# Patient Record
Sex: FEMALE | Race: BLACK OR AFRICAN AMERICAN | NOT HISPANIC OR LATINO | Employment: UNEMPLOYED | URBAN - METROPOLITAN AREA
[De-identification: names, ages, dates, MRNs, and addresses within clinical notes are randomized per-mention and may not be internally consistent; named-entity substitution may affect disease eponyms.]

---

## 2023-03-24 LAB
GENETICS TEST NAME-DATA CONVERSION: NORMAL
LAB MOLECULAR CA TECHNICAL NOTES: NORMAL

## 2023-04-11 ENCOUNTER — CLINICAL SUPPORT (OUTPATIENT)
Dept: PRIMARY CARE | Facility: CLINIC | Age: 53
End: 2023-04-11
Payer: COMMERCIAL

## 2023-04-11 ENCOUNTER — TELEPHONE (OUTPATIENT)
Dept: PRIMARY CARE | Facility: CLINIC | Age: 53
End: 2023-04-11

## 2023-04-11 DIAGNOSIS — A18.4 TB SKIN/SUBCUTANEOUS: ICD-10-CM

## 2023-04-11 PROCEDURE — 86580 TB INTRADERMAL TEST: CPT | Performed by: FAMILY MEDICINE

## 2023-04-12 PROBLEM — R92.8 ABNORMAL FINDING ON BREAST IMAGING: Status: ACTIVE | Noted: 2023-04-12

## 2023-04-12 PROBLEM — N95.1 VASOMOTOR SYMPTOMS DUE TO MENOPAUSE: Status: ACTIVE | Noted: 2023-04-12

## 2023-04-12 PROBLEM — C77.3 BREAST CANCER METASTASIZED TO AXILLARY LYMPH NODE, LEFT (MULTI): Status: ACTIVE | Noted: 2023-04-12

## 2023-04-12 PROBLEM — E55.9 VITAMIN D DEFICIENCY: Status: ACTIVE | Noted: 2023-04-12

## 2023-04-12 PROBLEM — C50.912 BREAST CANCER METASTASIZED TO AXILLARY LYMPH NODE, LEFT (MULTI): Status: ACTIVE | Noted: 2023-04-12

## 2023-04-12 PROBLEM — N63.32 MASS OF AXILLARY TAIL OF LEFT BREAST: Status: ACTIVE | Noted: 2023-04-12

## 2023-04-12 PROBLEM — N63.21 MASS OF UPPER OUTER QUADRANT OF LEFT BREAST: Status: ACTIVE | Noted: 2023-04-12

## 2023-04-12 PROBLEM — D25.9 UTERINE FIBROID: Status: ACTIVE | Noted: 2023-04-12

## 2023-04-12 PROBLEM — G56.01 CARPAL TUNNEL SYNDROME, RIGHT: Status: ACTIVE | Noted: 2023-04-12

## 2023-04-12 PROBLEM — M48.02 CERVICAL SPINAL STENOSIS: Status: ACTIVE | Noted: 2023-04-12

## 2023-04-12 PROBLEM — E04.9 GOITER: Status: ACTIVE | Noted: 2023-04-12

## 2023-04-12 RX ORDER — BENZOYL PEROXIDE 100 MG/ML
LIQUID TOPICAL DAILY
COMMUNITY
Start: 2018-09-24 | End: 2024-01-30 | Stop reason: SDUPTHER

## 2023-04-13 ENCOUNTER — CLINICAL SUPPORT (OUTPATIENT)
Dept: PRIMARY CARE | Facility: CLINIC | Age: 53
End: 2023-04-13
Payer: COMMERCIAL

## 2023-04-13 LAB
INDURATION: 0 MM
TB SKIN TEST: NEGATIVE

## 2023-04-18 ENCOUNTER — APPOINTMENT (OUTPATIENT)
Dept: PRIMARY CARE | Facility: CLINIC | Age: 53
End: 2023-04-18
Payer: COMMERCIAL

## 2023-04-20 ENCOUNTER — CLINICAL SUPPORT (OUTPATIENT)
Dept: PRIMARY CARE | Facility: CLINIC | Age: 53
End: 2023-04-20
Payer: COMMERCIAL

## 2023-05-23 ENCOUNTER — HOSPITAL ENCOUNTER (OUTPATIENT)
Dept: DATA CONVERSION | Facility: HOSPITAL | Age: 53
End: 2023-05-23
Attending: SURGERY
Payer: COMMERCIAL

## 2023-05-23 DIAGNOSIS — C50.912 MALIGNANT NEOPLASM OF UNSPECIFIED SITE OF LEFT FEMALE BREAST (MULTI): ICD-10-CM

## 2023-05-23 DIAGNOSIS — R92.8 OTHER ABNORMAL AND INCONCLUSIVE FINDINGS ON DIAGNOSTIC IMAGING OF BREAST: ICD-10-CM

## 2023-05-24 ENCOUNTER — HOSPITAL ENCOUNTER (OUTPATIENT)
Dept: DATA CONVERSION | Facility: HOSPITAL | Age: 53
End: 2023-05-24
Attending: SURGERY | Admitting: SURGERY
Payer: COMMERCIAL

## 2023-05-24 DIAGNOSIS — R92.8 OTHER ABNORMAL AND INCONCLUSIVE FINDINGS ON DIAGNOSTIC IMAGING OF BREAST: ICD-10-CM

## 2023-05-24 DIAGNOSIS — L73.2 HIDRADENITIS SUPPURATIVA: ICD-10-CM

## 2023-05-24 DIAGNOSIS — Z17.1 ESTROGEN RECEPTOR NEGATIVE STATUS (ER-): ICD-10-CM

## 2023-05-24 DIAGNOSIS — Z86.718 PERSONAL HISTORY OF OTHER VENOUS THROMBOSIS AND EMBOLISM: ICD-10-CM

## 2023-05-24 DIAGNOSIS — C50.912 MALIGNANT NEOPLASM OF UNSPECIFIED SITE OF LEFT FEMALE BREAST (MULTI): ICD-10-CM

## 2023-05-24 DIAGNOSIS — C50.412 MALIGNANT NEOPLASM OF UPPER-OUTER QUADRANT OF LEFT FEMALE BREAST (MULTI): ICD-10-CM

## 2023-05-24 DIAGNOSIS — K21.9 GASTRO-ESOPHAGEAL REFLUX DISEASE WITHOUT ESOPHAGITIS: ICD-10-CM

## 2023-05-25 LAB
HSV 1 PCR QUAL, SKIN/MUCOSA: NOT DETECTED
HSV 2 PCR QUAL, SKIN/MUCOSA: NOT DETECTED

## 2023-06-15 LAB
COMPLETE PATHOLOGY REPORT: NORMAL
CONVERTED CLINICAL DIAGNOSIS-HISTORY: NORMAL
CONVERTED FINAL DIAGNOSIS: NORMAL
CONVERTED FINAL REPORT PDF LINK TO COPY AND PASTE: NORMAL
CONVERTED GROSS DESCRIPTION: NORMAL
CONVERTED INTRAOPERATIVE DIAGNOSIS: NORMAL
CONVERTED SYNOPTIC DIAGNOSIS: NORMAL

## 2023-07-17 ENCOUNTER — DOCUMENTATION (OUTPATIENT)
Dept: PRIMARY CARE | Facility: CLINIC | Age: 53
End: 2023-07-17
Payer: COMMERCIAL

## 2023-07-18 ENCOUNTER — PATIENT OUTREACH (OUTPATIENT)
Dept: PRIMARY CARE | Facility: CLINIC | Age: 53
End: 2023-07-18
Payer: COMMERCIAL

## 2023-07-24 ENCOUNTER — HOSPITAL ENCOUNTER (OUTPATIENT)
Dept: DATA CONVERSION | Facility: HOSPITAL | Age: 53
Discharge: HOME | End: 2023-07-24

## 2023-07-24 DIAGNOSIS — C77.3 SECONDARY AND UNSPECIFIED MALIGNANT NEOPLASM OF AXILLA AND UPPER LIMB LYMPH NODES (MULTI): ICD-10-CM

## 2023-07-24 DIAGNOSIS — C50.912 MALIGNANT NEOPLASM OF UNSPECIFIED SITE OF LEFT FEMALE BREAST (MULTI): ICD-10-CM

## 2023-09-07 VITALS — BODY MASS INDEX: 35.47 KG/M2 | HEIGHT: 63 IN | WEIGHT: 200.18 LBS

## 2023-09-30 NOTE — H&P
"    History & Physical Reviewed:   Pregnant/Lactating:  ·  Are You Pregnant no (1)   ·  Are You Currently Breastfeeding no (1)     I have reviewed the History and Physical dated:  02-May-2023   History and Physical reviewed and relevant findings noted. Patient examined to review pertinent physical  findings.: No significant changes   Home Medications Reviewed: no changes noted   Allergies Reviewed: no changes noted       ERAS (Enhanced Recovery After Surgery):  ·  ERAS Patient: no     Consent:   COVID-19 Consent:  ·  COVID-19 Risk Consent Surgeon has reviewed key risks related to the risk of herbert COVID-19 and if they contract COVID-19 what the risks are.       Electronic Signatures:  Zora Reyes (MD)  (Signed 24-May-2023 07:27)   Authored: History & Physical Reviewed, ERAS, Consent,  Note Completion      Last Updated: 24-May-2023 07:27 by Zora Reyes (MD)    References:  1.  Data Referenced From \"Patient Profile - Preop v3\" 24-May-2023 06:14   "

## 2023-10-02 NOTE — OP NOTE
PROCEDURE DETAILS    Preoperative Diagnosis:  1. Left breast invasive ductal carcinoma, Stage IIB, ER-/SC-/HER2-, s/p neoadjuvant chemotherapy   2. Axillary hidradenitis      Postoperative Diagnosis:  1. Left breast invasive ductal carcinoma, Stage IIB, ER-/SC-/HER2-, s/p neoadjuvant chemotherapy   2. Axillary hidradenitis      Surgeon: Zora Reyes  Resident/Fellow/Other Assistant: Becca Whitney    Procedure:    1. Injection of radiotracer for intraoperative lymphatic mapping.  2. Injection of isosulfan blue for intraoperative lymphatic mapping.  3. Left axillary sentinel lymph node biopsy with dual tracer intraoperative lymphatic mapping.  4. Excision of left axillary lymph node with Magseed localization.   5. Left breast Magseed localized partial mastectomy  6. Left breast tissue rearrangement       Anesthesia: Adams Escobar  Estimated Blood Loss: 30 cc  Findings: left breast tissue with Magseed and biopsy clip in specimen on xray; left axillary SLN x4 benign on frozen section  Specimens(s) Collected: yes,  left breast tissue, shave cavity margins, left axillary SLN x4   Patient Returned To/Condition: PACU/stable         Operative Report:   INDICATIONS FOR PROCEDURE:    Sharonda Yeboah is a 52-year-old female who presented a palpable mass in her left axilla in October 2022. Diagnostic work-up yielded several large left axillary lymph nodes and left breast mass for which core needle biopsy yielded invasive ductal cancer,  ER-/SC-/HER2-; [id number], Stage IIB. I recommended neoadjuvant chemotherapy, which she completed in April 2023. Genetic testing was negative for deleterious mutations. She also met with Dr. Hawkins and adjuvant radiation was planned. Post-therapy imaging  and exam showed an excellent response to neoadjuvant chemotherapy. We discussed at length surgical options and I recommended left partial mastectomy, left axillary sentinel lymph node biopsy with dual tracer localization, and excision of the  previously  positive lymph node with Magseed localization, possible axillary dissection. Following a detailed discussion regarding risks, benefits, and alternatives, informed consent was obtained, and we agreed to proceed. Prior to her procedure, a Magseed was placed  in the previously biopsied left axillary lymph node and left breast mass. My personal review of her images demonstrated the Magseeds in good position relative to the lymph node, mass, and biopsy clip.      DESCRIPTION OF PROCEDURE:    The patient was brought to the operating room and positioned supine on the OR table.  Following patient identification and a time-out procedure, SCDs were applied, and antibiotics were administered. General anesthesia was initiated. I personally injected  5 mL of isosulfan blue and Tc-99 into the patient's left breast for dual tracer intraoperative lymphatic mapping. The breast was massaged for 5 minutes.  I used the Neoprobe to confirm uptake of the radiotracer in the left breast and left axilla.  I used  the Sentimag probe to confirm placement of the Magseed in the left axilla and left breast. The operative field was prepped and draped in a standard sterile fashion.      We began with the left axilla. An incision was marked at the base of the hair-bearing skin. Of note, she had significant hidradenitis which was less active due to current treatment with rifampin and clindamycin. 1% lidocaine was injected subcutaneously.  An incision was made sharply.  Dissection was carried down through the skin and subcutaneous tissues to the clavipectoral fascia, which was widely incised. I used the Sentimag probe to identify the Magseed deeper within the axilla, and then identified  the lymph node by palpation. Small clips were used on branching vessels and lymphatics. The lymph node containing the Magseed was excised. The node was hot and blue as well. It was labeled as left axillary sentinel lymph node #1, placed on a grid, and   sent to the breast Center for an x-ray; this demonstrated the Mageed and biopsy clip within the lymph node. I personally interpreted these images intraoperatively. The specimen was then sent to the pathology lab for frozen section. Three additional left  axillary sentinel lymph nodes were identified, removed, and sent to pathology for frozen section; all 3 were hot and blue. There were no additional visibly blue lymph nodes, no lymph nodes with increased radiotracer uptake, and no palpably suspicious  nodes.  The axilla was copiously irrigated with warm sterile saline solution.  Hemostasis was achieved.       We turned our attention to the left breast. The cancer was located laterally at mid depth. An incision at the far lateral breast was chosen to hide the surgical scar in a cosmetic location. 1% lidocaine was injected subcutaneously.  The skin was incised  sharply.  Dissection was carried down through skin and subcutaneous tissues. Soft tissue flaps were then raised in all directions: superior, inferior, medial, and lateral.  The tumor was located medial to our incision; therefore, a tunneling technique  was used.  I identified the anterior mastectomy plane between the subcutaneous tissues and the anterior breast parenchyma. I then developed the mastectomy flap widely up to and beyond the lesion. The Sentimag probe was used to identify the Magseed in  the area targeted for excision, and a marking stitch was placed within it for dissection purposes. This area was widely and circumferentially dissected from the surrounding tissues using electrocautery.  The specimen was excised and labeled as left breast  tissue.  It was marked with a double short stitch superior, double long stitch lateral, and a single stitch anterior. I personally inked all 6 margins of the entire specimen with the Vector kit using colors as prescribed.  It was placed on a grid and  sent to the Breast Center for an x-ray which demonstrated the  biopsy clip, and the Magseed within the specimen.  I personally interpreted these images intraoperatively.  The specimen was then sent to Pathology for permanent section.     I then proceeded to take shave cavity margins circumferentially: superior, medial, inferior, lateral, anterior, and posterior. Each was removed, marked with a clip and ink on the new margin, and sent separately to Pathology for permanent section. The  excision cavity was copiously irrigated with warm sterile saline solution.  Hemostasis was achieved.  I then placed marking clips circumferentially around the cavity: superior, inferior, medial, lateral, posterior and anterior.     The specimen measured 4.5 cm x 4.0 cm x 3.5 cm, and with shave cavity margins, the soft tissue defect was approximately 75 sq cm; therefore, local tissue rearrangement was performed. Surrounding breast parenchyma was mobilized circumferentially from the  anterior mastectomy plane anteriorly and posteriorly from within the breast parenchyma.  Once mobilized, the superior medial and inferior lateral pillars were advanced centrally and secured to each other with a dyed 3-0 Vicryl suture in an interrupted  fashion to close the defect. Hemostasis was reconfirmed. The more superficial layer of tissue that had been mobilized in the breast at the beginning of the procedure was similarly closed in order to create a cosmetic effect.     Our frozen section results returned and all 4 left axillary lymph nodes were benign; #1 with the Magseed and biopsy clip also showed fibrosis and therapy changes.    0.5% Marcaine was injected subcutaneously for analgesia into both the breast and axillary incisions.  The incisions were then closed in layers with an interrupted 3-0 Vicryl in the deep dermis followed by a running subcuticular 4-0 Monocryl for the skin.  Skin glue, sterile dressings, fluffs, and a surgical bra were then applied.     The patient tolerated the procedure well.  There  were no immediate complications.  All counts were correct.  Estimated blood loss was 30 cc.  I was present for and performed the entire procedure.  The patient was then awakened and transported to the PACU  in stable condition.      Zora Reyes MD  Breast Surgical Oncology   pager [phone]    Note Recipients:   Abebe Hamlin MD Shroff, Palak, MD - [phone] [Preferred]      Fredi Synoptic Op Report:  Breast [place] Node Biopsy  Operation performed with curative intent: yes  Tracer(s) used to identify sentinel nodes in the upfront surgery (non-neoadjuvant) setting: not applicable  Tracer(s) used to identify sentinel nodes in the neoadjuvant setting: dye and radioactive tracer  All nodes (colored or non-colored) present at the end of a dye-filled lymphatic channel were removed: yes  All significantly radioactive nodes were removed: yes  All palpably suspicious nodes were removed: yes  Biopsy-proven positive nodes marked with clips prior to chemotherapy were identified and removed: yes                    Attestation:   Note Completion:  Attending Attestation I performed the procedure without a resident         Electronic Signatures:  Zora Reyes)  (Signed 24-May-2023 21:24)   Authored: Post-Operative Note, Chart Review, Note Completion      Last Updated: 24-May-2023 21:24 by Zora Reyes)

## 2023-10-10 ENCOUNTER — APPOINTMENT (OUTPATIENT)
Dept: PHYSICAL THERAPY | Facility: CLINIC | Age: 53
End: 2023-10-10
Payer: COMMERCIAL

## 2023-10-16 ENCOUNTER — OFFICE VISIT (OUTPATIENT)
Dept: PRIMARY CARE | Facility: CLINIC | Age: 53
End: 2023-10-16
Payer: COMMERCIAL

## 2023-10-16 VITALS
HEART RATE: 81 BPM | SYSTOLIC BLOOD PRESSURE: 120 MMHG | OXYGEN SATURATION: 95 % | WEIGHT: 185 LBS | BODY MASS INDEX: 31.58 KG/M2 | DIASTOLIC BLOOD PRESSURE: 75 MMHG | HEIGHT: 64 IN

## 2023-10-16 DIAGNOSIS — C77.3 BREAST CANCER METASTASIZED TO AXILLARY LYMPH NODE, LEFT (MULTI): ICD-10-CM

## 2023-10-16 DIAGNOSIS — E55.9 VITAMIN D DEFICIENCY: ICD-10-CM

## 2023-10-16 DIAGNOSIS — E04.9 GOITER: ICD-10-CM

## 2023-10-16 DIAGNOSIS — Z00.00 ROUTINE GENERAL MEDICAL EXAMINATION AT HEALTH CARE FACILITY: ICD-10-CM

## 2023-10-16 DIAGNOSIS — R73.9 ELEVATED BLOOD SUGAR: ICD-10-CM

## 2023-10-16 DIAGNOSIS — I82.4Z2 ACUTE DEEP VEIN THROMBOSIS (DVT) OF DISTAL VEIN OF LEFT LOWER EXTREMITY (MULTI): ICD-10-CM

## 2023-10-16 DIAGNOSIS — E78.2 MODERATE MIXED HYPERLIPIDEMIA NOT REQUIRING STATIN THERAPY: ICD-10-CM

## 2023-10-16 DIAGNOSIS — D50.8 OTHER IRON DEFICIENCY ANEMIA: ICD-10-CM

## 2023-10-16 DIAGNOSIS — L73.2 HIDRADENITIS SUPPURATIVA: ICD-10-CM

## 2023-10-16 DIAGNOSIS — Z00.00 MEDICARE ANNUAL WELLNESS VISIT, SUBSEQUENT: Primary | ICD-10-CM

## 2023-10-16 DIAGNOSIS — Z11.59 NEED FOR HEPATITIS C SCREENING TEST: ICD-10-CM

## 2023-10-16 DIAGNOSIS — Z11.4 SCREENING FOR HIV WITHOUT PRESENCE OF RISK FACTORS: ICD-10-CM

## 2023-10-16 DIAGNOSIS — E66.09 CLASS 1 OBESITY DUE TO EXCESS CALORIES WITH SERIOUS COMORBIDITY AND BODY MASS INDEX (BMI) OF 31.0 TO 31.9 IN ADULT: ICD-10-CM

## 2023-10-16 DIAGNOSIS — C50.912 BREAST CANCER METASTASIZED TO AXILLARY LYMPH NODE, LEFT (MULTI): ICD-10-CM

## 2023-10-16 PROBLEM — R92.8 ABNORMAL FINDING ON BREAST IMAGING: Status: RESOLVED | Noted: 2023-04-12 | Resolved: 2023-10-16

## 2023-10-16 PROBLEM — N63.21 MASS OF UPPER OUTER QUADRANT OF LEFT BREAST: Status: RESOLVED | Noted: 2023-04-12 | Resolved: 2023-10-16

## 2023-10-16 LAB
ALBUMIN SERPL BCP-MCNC: 3.8 G/DL (ref 3.4–5)
ALP SERPL-CCNC: 102 U/L (ref 33–110)
ALT SERPL W P-5'-P-CCNC: 12 U/L (ref 7–45)
ANION GAP SERPL CALC-SCNC: 15 MMOL/L (ref 10–20)
AST SERPL W P-5'-P-CCNC: 14 U/L (ref 9–39)
BILIRUB SERPL-MCNC: 0.5 MG/DL (ref 0–1.2)
BUN SERPL-MCNC: 13 MG/DL (ref 6–23)
CALCIUM SERPL-MCNC: 9.6 MG/DL (ref 8.6–10.6)
CHLORIDE SERPL-SCNC: 106 MMOL/L (ref 98–107)
CHOLEST SERPL-MCNC: 213 MG/DL (ref 0–199)
CHOLESTEROL/HDL RATIO: 3.7
CO2 SERPL-SCNC: 26 MMOL/L (ref 21–32)
CREAT SERPL-MCNC: 0.9 MG/DL (ref 0.5–1.05)
EST. AVERAGE GLUCOSE BLD GHB EST-MCNC: 103 MG/DL
FERRITIN SERPL-MCNC: 72 NG/ML (ref 8–150)
GFR SERPL CREATININE-BSD FRML MDRD: 76 ML/MIN/1.73M*2
GLUCOSE SERPL-MCNC: 98 MG/DL (ref 74–99)
HBA1C MFR BLD: 5.2 %
HDLC SERPL-MCNC: 57.2 MG/DL
IRON SATN MFR SERPL: 26 % (ref 25–45)
IRON SERPL-MCNC: 85 UG/DL (ref 35–150)
LDLC SERPL CALC-MCNC: 136 MG/DL
NON HDL CHOLESTEROL: 156 MG/DL (ref 0–149)
POTASSIUM SERPL-SCNC: 4.6 MMOL/L (ref 3.5–5.3)
PROT SERPL-MCNC: 6.3 G/DL (ref 6.4–8.2)
SODIUM SERPL-SCNC: 142 MMOL/L (ref 136–145)
TIBC SERPL-MCNC: 321 UG/DL (ref 240–445)
TRIGL SERPL-MCNC: 101 MG/DL (ref 0–149)
UIBC SERPL-MCNC: 236 UG/DL (ref 110–370)
VLDL: 20 MG/DL (ref 0–40)

## 2023-10-16 PROCEDURE — 83550 IRON BINDING TEST: CPT

## 2023-10-16 PROCEDURE — G0439 PPPS, SUBSEQ VISIT: HCPCS | Performed by: FAMILY MEDICINE

## 2023-10-16 PROCEDURE — 1036F TOBACCO NON-USER: CPT | Performed by: FAMILY MEDICINE

## 2023-10-16 PROCEDURE — 80061 LIPID PANEL: CPT

## 2023-10-16 PROCEDURE — 3008F BODY MASS INDEX DOCD: CPT | Performed by: FAMILY MEDICINE

## 2023-10-16 PROCEDURE — 99213 OFFICE O/P EST LOW 20 MIN: CPT | Performed by: FAMILY MEDICINE

## 2023-10-16 PROCEDURE — 80053 COMPREHEN METABOLIC PANEL: CPT

## 2023-10-16 PROCEDURE — 82728 ASSAY OF FERRITIN: CPT

## 2023-10-16 PROCEDURE — 82607 VITAMIN B-12: CPT

## 2023-10-16 PROCEDURE — 84443 ASSAY THYROID STIM HORMONE: CPT

## 2023-10-16 PROCEDURE — 86803 HEPATITIS C AB TEST: CPT

## 2023-10-16 PROCEDURE — 87389 HIV-1 AG W/HIV-1&-2 AB AG IA: CPT

## 2023-10-16 PROCEDURE — 36415 COLL VENOUS BLD VENIPUNCTURE: CPT

## 2023-10-16 PROCEDURE — 83540 ASSAY OF IRON: CPT

## 2023-10-16 PROCEDURE — 83036 HEMOGLOBIN GLYCOSYLATED A1C: CPT

## 2023-10-16 RX ORDER — OMEPRAZOLE 40 MG/1
40 CAPSULE, DELAYED RELEASE ORAL DAILY
COMMUNITY
Start: 2023-07-10

## 2023-10-16 RX ORDER — CALCIUM CARBONATE 500(1250)
1 TABLET ORAL
COMMUNITY

## 2023-10-16 RX ORDER — MINOCYCLINE HYDROCHLORIDE 100 MG/1
CAPSULE ORAL
COMMUNITY

## 2023-10-16 RX ORDER — MULTIVITAMIN
1 TABLET ORAL DAILY
COMMUNITY

## 2023-10-16 RX ORDER — VITAMIN E MIXED 400 UNIT
CAPSULE ORAL DAILY
COMMUNITY
End: 2024-04-04 | Stop reason: WASHOUT

## 2023-10-16 RX ORDER — BIOTIN 10000 MCG
TABLET,CHEWABLE ORAL
COMMUNITY
End: 2024-04-04 | Stop reason: WASHOUT

## 2023-10-16 ASSESSMENT — ENCOUNTER SYMPTOMS
LOSS OF SENSATION IN FEET: 0
OCCASIONAL FEELINGS OF UNSTEADINESS: 0
DEPRESSION: 0

## 2023-10-16 ASSESSMENT — PATIENT HEALTH QUESTIONNAIRE - PHQ9
SUM OF ALL RESPONSES TO PHQ9 QUESTIONS 1 AND 2: 0
1. LITTLE INTEREST OR PLEASURE IN DOING THINGS: NOT AT ALL
2. FEELING DOWN, DEPRESSED OR HOPELESS: NOT AT ALL

## 2023-10-16 ASSESSMENT — ACTIVITIES OF DAILY LIVING (ADL)
BATHING: INDEPENDENT
MANAGING_FINANCES: INDEPENDENT
DRESSING: INDEPENDENT
GROCERY_SHOPPING: INDEPENDENT
TAKING_MEDICATION: INDEPENDENT
DOING_HOUSEWORK: INDEPENDENT

## 2023-10-16 ASSESSMENT — COLUMBIA-SUICIDE SEVERITY RATING SCALE - C-SSRS
2. HAVE YOU ACTUALLY HAD ANY THOUGHTS OF KILLING YOURSELF?: NO
1. IN THE PAST MONTH, HAVE YOU WISHED YOU WERE DEAD OR WISHED YOU COULD GO TO SLEEP AND NOT WAKE UP?: NO

## 2023-10-17 LAB
HCV AB SER QL: NONREACTIVE
HIV 1+2 AB+HIV1 P24 AG SERPL QL IA: NONREACTIVE
TSH SERPL-ACNC: 3.47 MIU/L (ref 0.44–3.98)
VIT B12 SERPL-MCNC: 293 PG/ML (ref 211–911)

## 2023-10-18 ENCOUNTER — TELEPHONE (OUTPATIENT)
Dept: DERMATOLOGY | Facility: CLINIC | Age: 53
End: 2023-10-18
Payer: COMMERCIAL

## 2023-10-18 DIAGNOSIS — L73.2 HIDRADENITIS SUPPURATIVA: Primary | ICD-10-CM

## 2023-10-18 RX ORDER — DOXYCYCLINE HYCLATE 100 MG
100 TABLET ORAL 2 TIMES DAILY
Qty: 180 TABLET | Refills: 0 | Status: SHIPPED | OUTPATIENT
Start: 2023-10-18 | End: 2024-01-30 | Stop reason: SDUPTHER

## 2023-10-18 NOTE — TELEPHONE ENCOUNTER
----- Message from Shagufta Cui MA sent at 10/18/2023  9:02 AM EDT -----  Regarding: Change Meds  Patient called and stated that the minocycline is not working and would like to switch to doxycycline

## 2023-10-18 NOTE — TELEPHONE ENCOUNTER
----- Message from Jamia Carter MD sent at 10/18/2023 12:09 PM EDT -----  Regarding: RE: Change Meds  I have sent Rx doxycycline for a 3 month supply to her [facility]    I would like to see her in clinic again for follow up - I know she has been busy with her other medical problems but can we schedule her for another in person visit in the next 3 months?    ----- Message -----  From: Shagufta Cui MA  Sent: 10/18/2023   9:03 AM EDT  To: Jamia Carter MD  Subject: Change Meds                                      Patient called and stated that the minocycline is not working and would like to switch to doxycycline

## 2023-10-18 NOTE — TELEPHONE ENCOUNTER
I have sent Rx doxycycline for a 3 month supply to her [facility]    I would like to see her in clinic again for follow up - I know she has been busy with her other medical problems but can we schedule her for another in person visit in the next 3 months?

## 2023-10-24 ENCOUNTER — HOSPITAL ENCOUNTER (OUTPATIENT)
Dept: RADIOLOGY | Facility: HOSPITAL | Age: 53
Discharge: HOME | End: 2023-10-24
Payer: COMMERCIAL

## 2023-10-24 ENCOUNTER — OFFICE VISIT (OUTPATIENT)
Dept: SURGICAL ONCOLOGY | Facility: HOSPITAL | Age: 53
End: 2023-10-24
Payer: COMMERCIAL

## 2023-10-24 VITALS
TEMPERATURE: 96.6 F | SYSTOLIC BLOOD PRESSURE: 130 MMHG | HEART RATE: 82 BPM | WEIGHT: 186.95 LBS | BODY MASS INDEX: 31.92 KG/M2 | OXYGEN SATURATION: 99 % | HEIGHT: 64 IN | DIASTOLIC BLOOD PRESSURE: 81 MMHG | RESPIRATION RATE: 17 BRPM

## 2023-10-24 DIAGNOSIS — C77.3 BREAST CANCER METASTASIZED TO AXILLARY LYMPH NODE, LEFT (MULTI): Primary | ICD-10-CM

## 2023-10-24 DIAGNOSIS — C50.912 MALIGNANT NEOPLASM OF UNSPECIFIED SITE OF LEFT FEMALE BREAST (MULTI): ICD-10-CM

## 2023-10-24 DIAGNOSIS — Z08 ENCOUNTER FOR FOLLOW-UP SURVEILLANCE OF BREAST CANCER: ICD-10-CM

## 2023-10-24 DIAGNOSIS — L73.2 HIDRADENITIS SUPPURATIVA: ICD-10-CM

## 2023-10-24 DIAGNOSIS — Z92.3 S/P RADIATION THERAPY: ICD-10-CM

## 2023-10-24 DIAGNOSIS — Z90.12 STATUS POST PARTIAL MASTECTOMY OF LEFT BREAST: ICD-10-CM

## 2023-10-24 DIAGNOSIS — N63.0 UNSPECIFIED LUMP IN UNSPECIFIED BREAST: ICD-10-CM

## 2023-10-24 DIAGNOSIS — Z92.21 S/P CHEMOTHERAPY, TIME SINCE GREATER THAN 12 WEEKS: ICD-10-CM

## 2023-10-24 DIAGNOSIS — C50.912 BREAST CANCER METASTASIZED TO AXILLARY LYMPH NODE, LEFT (MULTI): Primary | ICD-10-CM

## 2023-10-24 DIAGNOSIS — Z85.3 ENCOUNTER FOR FOLLOW-UP SURVEILLANCE OF BREAST CANCER: ICD-10-CM

## 2023-10-24 PROCEDURE — 76642 ULTRASOUND BREAST LIMITED: CPT | Mod: LT

## 2023-10-24 PROCEDURE — 99213 OFFICE O/P EST LOW 20 MIN: CPT | Mod: 25 | Performed by: SURGERY

## 2023-10-24 PROCEDURE — 3008F BODY MASS INDEX DOCD: CPT | Performed by: SURGERY

## 2023-10-24 PROCEDURE — 99213 OFFICE O/P EST LOW 20 MIN: CPT | Performed by: SURGERY

## 2023-10-24 PROCEDURE — 77062 BREAST TOMOSYNTHESIS BI: CPT

## 2023-10-24 PROCEDURE — 77066 DX MAMMO INCL CAD BI: CPT | Performed by: RADIOLOGY

## 2023-10-24 PROCEDURE — 1036F TOBACCO NON-USER: CPT | Performed by: SURGERY

## 2023-10-24 PROCEDURE — 77066 DX MAMMO INCL CAD BI: CPT

## 2023-10-24 PROCEDURE — 76642 ULTRASOUND BREAST LIMITED: CPT | Performed by: RADIOLOGY

## 2023-10-24 PROCEDURE — G0279 TOMOSYNTHESIS, MAMMO: HCPCS | Performed by: RADIOLOGY

## 2023-10-24 ASSESSMENT — PAIN SCALES - GENERAL: PAINLEVEL: 0-NO PAIN

## 2023-10-24 ASSESSMENT — ENCOUNTER SYMPTOMS
NEUROLOGICAL NEGATIVE: 1
ENDOCRINE NEGATIVE: 1
MUSCULOSKELETAL NEGATIVE: 1
PSYCHIATRIC NEGATIVE: 1
GASTROINTESTINAL NEGATIVE: 1
HEMATOLOGIC/LYMPHATIC NEGATIVE: 1
CARDIOVASCULAR NEGATIVE: 1
CONSTITUTIONAL NEGATIVE: 1
EYES NEGATIVE: 1
RESPIRATORY NEGATIVE: 1
ALLERGIC/IMMUNOLOGIC NEGATIVE: 1

## 2023-10-24 NOTE — PROGRESS NOTES
Subjective   Patient ID: Sharonda Yeboah is a 53 y.o. female presenting for breast cancer surveillance.    HPI  Sharonda Yeboah is a very pleasant 53 year old  female s/p left Magseed localized partial mastectomy, left axillary SLNB with Magseed localization 5/24/23 for invasive ductal carcinoma, ER/IL/HER2 negative, [id number], clinical stage IIB s/p neoadjuvant chemotherapy. Surgical pathology showed [id number], pathologic complete response.      Breast cancer history:  She initially palpated a left axillary mass in September 2022. She has a history of bilateral axillary hidradenitis which she initially thought could be the source.      On 10/5/2022; bilateral mammogram shows stable asymmetry central right breast; At the site of the right axillary palpable concern no mammographic abnormality; right axillary nodes are stable. Ultrasound right axilla fatty mass measuring 3.3 x 1.2 x 4.1 cm; consistent with benign lipoma. Ultrasound left lateral breast shows a bilobed mass at 2:00 7 cm from the nipple measuring 3.6 x 3.0 x 3.4 cm; left axilla demonstrates 4 abnormal axillary lymph nodes. BIRADS 5     On 10/12/2022 left breast ultrasound guided breast biopsy yielded invasive ductal carcinoma, grade 3, ductal carcinoma in situ, ER/IL/HER2 negative. Left axillary lymphoid tissue with metastatic carcinoma.      She presented on 10/18/2022 with her mother Kellee for surgical consultation. She was referred to medical oncology, radiation oncology, and genetics.     10/19/22 Bilateral breast MRI : Left breast- An irregular mass with areas of central necrosis is seen in the central lateral aspect at middle depth measuring 4.4 x 4.1 x 3.2 cm (transverse by AP by craniocaudal) Signal void from the biopsy tissue marker is present. Two bulky axillary lymph nodes are noted with the larger lymph node containing an internal biopsy tissue marker, compatible with known metastatic disease. No level 2 or level 3 lymph nodes are  identified. A peripheral rim enhancing mass is seen in the left internal mammary region measuring 0.9 x 0.8 cm, concerning for a malignant lymph node. Right breast- benign.     She initiated neoadjuvant chemotherapy with Dr. Abebe Hamlin 11/4/22- KEYNOTE 522 protocol. On clinical exam with Mandie Reyes CNP 2/24/2023 the axillary mass and left breast mass were no longer palpable.     She presented for mid-treatment surgical visit by phone on 3/7/2023 and was interested in bilateral mastectomy with reconstruction. She was referred to radiation oncology, and encouraged to follow-up with genetics.     3/8/2023 she met with genetics and proceeded with testing. Results were negative for any deleterious mutations.     3/13/2023 she met with Dr. Hawkins in radiation oncology with plans for PMRT to the left breast and comprehensive nodes including IMNs given probable involvement at diagnosis.     3/23/2023 she met with Dr. Bautista in plastic surgery with discussion about immediate autologous reconstruction versus immediate tissue expander placement and delayed autologous reconstruction. She was referred to Dr. Genaro Upton, who recommended upfront simple bilateral mastectomy and delayed autologous reconstruction, with or without immediate tissue expander placement based on need for PMRT.     4/14/23 Post-treatment breast MRI: Left breast posttreatment changes and left low axillary small lymph nodes with a single prominent lymph node,  suggesting excellent response to treatment. Bilateral areas of skin enhancement as detailed above. Clinical correlation is recommended. No MRI evidence of malignancy in the right breast within the marked limitations.     4/18/23 left breast mammogram and US: Targeted ultrasound was performed of the left breast and axilla by a registered sonographer using elastography. In the left breast at 2 o'clock, 7 cm from the nipple, there is a biopsy marker with associated mass consistent with the patient's  known cancer measuring approximately 1.9 x 1.0 x 1.8 cm (previously up to 5.2 cm) which is soft on elastography and does not contain internal flow. Previously biopsied left axillary lymph node labeled 3 has significantly decreased in size, now measuring 1.6 x 0.7 x 1.5 cm with a cortical thickness of up to 5 mm (previously up to 1.1 cm). Findings are consistent with treatment response.     23 left Magseed localized partial mastectomy, left axillary SLNB with Magseed localization. Pathology yielded [id number], pathologic complete response.      Sharonda was recommended 8 additional cycles of pembrolizumab with Dr. Hamlin in medical oncology but completed therapy after 3 due to GI symptoms (2023). She completed radiation July- 2023 with Dr. Mclaughlin. She has felt left breast fullness and swelling consistent with lymphedema. Was referred to PT but visit was moved to November.      On 10/24/23, bilateral mammogram and left breast ultrasound showed no mammographic evidence of malignancy. New postoperative and postradiation changes, left breast. Benign-appearing fat necrosis correlating with the area of palpable concern. BIRADS 2.     MEDICAL HISTORY; obesity; carpal tunnel right; spinal stenosis; history of hidradenitis s/p right axillary excision  SURGICAL HISTORY; bowel obstruction; uterine fibroid  REVIEW OF SYSTEMS; hot flashes lump left armpit  FEMALE HISTORY: First menstrual cycle age 11; last menstrual cycle about age 38 with an endometrial ablation;  6 para 4 twins age 20 years old; breast-fed her last daughter for about 3 months; had an IUD for about 3 months;  BREAST DENSITY: Fibroglandular tissue  SOCIAL HISTORY: Quit smoking Oct 2022; occasional glass of wine, works as a caregiver with [employer]; active in [employer]  FAMILY HISTORY: Paternal grandmother with breast cancer. Alive and well at age 101.    Past Medical History:   Diagnosis Date    Abnormal levels of other serum  enzymes 04/07/2020    Elevated alkaline phosphatase level    Acute candidiasis of vulva and vagina 12/23/2019    Vaginal candidiasis    Breast cancer (CMS/HCC) 05/01/2023    left lumpectomy w/rad & chemo    Change in bowel habit 07/19/2017    Encounter for diagnostic colonoscopy due to change in bowel habits    Contact with and (suspected) exposure to potentially hazardous body fluids 07/25/2017    History of exposure to hazardous bodily fluids    Dorsalgia, unspecified 06/17/2015    Acute back pain    Encounter for follow-up examination after completed treatment for conditions other than malignant neoplasm 02/17/2018    Hospital discharge follow-up    Encounter for screening for respiratory tuberculosis 07/25/2017    Encounter for screening for respiratory tuberculosis    Endometriosis, unspecified 10/18/2022    Endometriosis    Fever, unspecified 12/28/2021    Fever and chills    Functional intestinal disorder, unspecified 10/18/2022    Functional bowel disorder    Generalized abdominal pain 06/06/2017    Generalized abdominal pain    Hx antineoplastic chemo     Low back pain, unspecified 10/18/2022    Lumbar pain    Other conditions influencing health status     Arthritis    Other conditions influencing health status     History of dyspareunia    Other specified cough 09/25/2019    Productive cough    Pain in left knee 12/29/2016    Left knee pain    Personal history of diseases of the blood and blood-forming organs and certain disorders involving the immune mechanism 07/25/2017    History of anemia    Personal history of diseases of the blood and blood-forming organs and certain disorders involving the immune mechanism 07/25/2017    History of anemia    Personal history of diseases of the skin and subcutaneous tissue 06/06/2017    History of eczema    Personal history of irradiation     Personal history of other diseases of the digestive system 07/19/2017    History of constipation    Personal history of other  diseases of the digestive system     History of esophageal reflux    Personal history of other diseases of the musculoskeletal system and connective tissue 02/16/2017    History of neck pain    Personal history of other diseases of the musculoskeletal system and connective tissue 01/19/2017    History of back pain    Personal history of other diseases of the nervous system and sense organs     History of migraine headaches    Personal history of other diseases of the respiratory system 12/31/2020    History of acute bacterial sinusitis    Personal history of other diseases of the respiratory system 01/21/2021    History of sore throat    Personal history of other diseases of the respiratory system 06/04/2019    History of acute bronchitis    Personal history of other mental and behavioral disorders 10/18/2022    History of depression    Personal history of other specified conditions 06/06/2017    History of exertional chest pain    Personal history of other specified conditions 08/02/2017    History of abdominal pain    Personal history of other specified conditions 10/23/2020    History of fatigue    Personal history of other specified conditions 07/25/2017    History of fatigue    Personal history of other specified conditions 02/17/2018    History of diarrhea    Plantar fascial fibromatosis 03/22/2014    Plantar fasciitis, right    Sprain of unspecified rotator cuff capsule, initial encounter 10/18/2022    Rotator cuff (capsule) sprain    Tobacco abuse counseling 07/25/2017    Encounter for tobacco use cessation counseling     Current Outpatient Medications on File Prior to Visit   Medication Sig Dispense Refill    apixaban (Eliquis) 5 mg tablet Take 1 tablet (5 mg) by mouth twice a day.      benzoyl peroxide (Benzac AC) 10 % external wash once daily. As directed leave on for 5 min then rinse      biotin (Hair, Skin and Nails, biotin,) 10,000 mcg tablet,chewable Chew.      calcium carbonate (Oscal) 500 mg calcium  (1,250 mg) tablet Take 1 tablet (1,250 mg) by mouth 2 times a day with meals.      doxycycline (Vibra-Tabs) 100 mg tablet Take 1 tablet (100 mg) by mouth 2 times a day. Take with food and a full glass of water and do not lie down for at least 30 minutes after. 180 tablet 0    minocycline 100 mg capsule TAKE 1 CAPSULE BY MOUTH TWICE DAILY WITH FOOD AND A GLASS OF WATER      multivitamin tablet Take 1 tablet by mouth once daily.      omeprazole (PriLOSEC) 40 mg DR capsule Take 1 capsule (40 mg) by mouth once daily.      vitamin E 450 mg (1000 unit) capsule Take by mouth once daily.       No current facility-administered medications on file prior to visit.       Review of Systems   Constitutional: Negative.    HENT: Negative.     Eyes: Negative.    Respiratory: Negative.     Cardiovascular: Negative.    Gastrointestinal: Negative.    Endocrine: Negative.    Genitourinary: Negative.    Musculoskeletal: Negative.    Skin: Negative.    Allergic/Immunologic: Negative.    Neurological: Negative.    Hematological: Negative.    Psychiatric/Behavioral: Negative.     All other systems reviewed and are negative.      Objective   Physical Exam  General: Otherwise healthy appearing. No acute distress.      HEENT: Conjunctiva well-colored, sclera non-icteric. Neck supple, trachea midline. No lymphadenopathy or thyromegaly.      Cardiovascular: Regular rate and rhythm.     Respiratory: Clear to auscultation bilaterally.      Breast: Exam performed in the sitting and supine positions. Left breast: Far UOQ well-healed incision with surrounding scar tissue at the site of the patient's palpable abnormality. Left axillary incision, well-healed. Moderate radiation changes and edema. Right breast: No palpable abnormalities, no skin or nipple changes. Bra size 42C.      Abdomen: Soft, non-tender, non-distended.     Extremities: Atraumatic, no edema.      Neurologic: Motor and sensory grossly intact. Alert and oriented.      Lymphatic: No  supraclavicular or infraclavicular lymphadenopathy bilaterally. Right high axiliary incision with scar tissue consistent with hidradenitis excision. No obviously palpable axillary lymphadenopathy bilaterally.      Assessment/Plan   Today we reviewed your pertinent history, physical exam, imaging, pathology, and treatment for breast cancer. You have completed chemotherapy, surgery, and radiation. Your clinical exam and imaging today show no worrisome findings. The area you feel near your surgical incision is scar tissue and fat necrosis. These are normal changes after surgery and radiation.    All questions were answered in detail, and we are in agreement with the following plan:    1. Please follow up with Mandie Reyes CNP in medical oncology.    2. Please follow up with Dr. Hawkins in radiation oncology.    3. Please keep your PT appointment for breast lymphedema. This will be very helpful for your symptoms.   4. Please return for clinical exam in 6 months with one of our nurse practitioners in breast cancer survivorship. You will be due for bilateral mammogram October 2024.    If you have any questions, concerns, or changes in your breast self-exam prior to our next visit, please call my office at the number below.. Our breast care team looks forward to seeing you again soon.     Zora Reyes MD   Breast Surgical Oncology Department of Surgery   [facility]   Office: [phone] (option #2)       DISCLAIMER: Speech recognition software was used to create portions of this document. While an attempt at proofreading has been made, minor errors in transcription may be present.    Diagnoses and all orders for this visit:  Breast cancer metastasized to axillary lymph node, left (CMS/HCC)  Encounter for follow-up surveillance of breast cancer  Status post partial mastectomy of left breast  S/P radiation therapy  S/P chemotherapy, time since greater than 12 weeks  Hidradenitis  jackie Caruso Attestation  By signing my name below, I, Zuly Topete   attest that this documentation has been prepared under the direction and in the presence of Zora Reyes MD.

## 2023-11-03 ENCOUNTER — EVALUATION (OUTPATIENT)
Dept: PHYSICAL THERAPY | Facility: CLINIC | Age: 53
End: 2023-11-03
Payer: COMMERCIAL

## 2023-11-03 DIAGNOSIS — I97.2 LYMPHEDEMA SYNDROME, POSTMASTECTOMY: Primary | ICD-10-CM

## 2023-11-03 DIAGNOSIS — I97.2 POSTMASTECTOMY LYMPHEDEMA SYNDROME: ICD-10-CM

## 2023-11-03 PROCEDURE — 97163 PT EVAL HIGH COMPLEX 45 MIN: CPT | Mod: GP

## 2023-11-03 PROCEDURE — 97530 THERAPEUTIC ACTIVITIES: CPT | Mod: GP

## 2023-11-03 ASSESSMENT — ENCOUNTER SYMPTOMS
DEPRESSION: 0
OCCASIONAL FEELINGS OF UNSTEADINESS: 0
LOSS OF SENSATION IN FEET: 0

## 2023-11-03 ASSESSMENT — PAIN SCALES - GENERAL: PAINLEVEL_OUTOF10: 6

## 2023-11-03 ASSESSMENT — PAIN - FUNCTIONAL ASSESSMENT: PAIN_FUNCTIONAL_ASSESSMENT: 0-10

## 2023-11-03 NOTE — Clinical Note
November 3, 2023     Patient: Sharonda Yeboah   YOB: 1970   Date of Visit: 11/3/2023       To Whom It May Concern:    It is my medical opinion that Sharonda Yeboah {Work release (duty restriction):31192}.    If you have any questions or concerns, please don't hesitate to call.         Sincerely,        Sherly Beatty, PT    CC: No Recipients

## 2023-11-03 NOTE — PROGRESS NOTES
Physical Therapy    Physical Therapy Evaluation and Treatment      Patient Name: Sharonda Yeboah  MRN: [id number]  Today's Date: 11/3/2023  Time Calculation  Start Time: 1105  Stop Time: 1200  Time Calculation (min): 55 min      Assessment:  PT Assessment Results: Pain (scar tissue adhesions, edema)  Rehab Prognosis: Good  Evaluation/Treatment Tolerance: Patient tolerated treatment well  Strengths: Ability to acquire knowledge, Attitude of self  Assessment Comment: Pt is a 52 y/o female with s/s of stage 2 secondary lymphedema of the left breast and stage 0 lymphedema of left UE due to breast cancer treatment. Pt would benefit from complete decongestive therapy including skin care, remedial exercises, manual lymph drainage and compression therapy to reduce pain and edema and improve QOL.  Also recommend pt obtain left compression sleeve and gauntlet 20-30mm Hg    Plan:  Treatment/Interventions: Education/ Instruction, Manual therapy, Self care/ home management, Taping techniques, Therapeutic activities, Therapeutic exercises (complete decongestive therapy including skin care, redmedial exercises, manual lymph drainage and compression therapy)  PT Plan: Skilled PT  PT Frequency: 2 times per week  Duration: 6 weeks  Onset Date: 11/03/23  Number of Treatments Authorized: pending  Rehab Potential: Good  Plan of Care Agreement: Patient    Current Problem:   1. Lymphedema syndrome, postmastectomy        2. Postmastectomy lymphedema syndrome  Referral to Physical Therapy    Follow Up In Physical Therapy          Subjective    Pt diagnosed with left breast cancer  metastasized to axillary lymph node earlier this year. Had left lumpectomy with 4 lymph nodes removed.  Pt has completed chemo and radiation.  Pt states that left breast has been swollen since surgery, worse after radiation.  States that she often gets sharp, shooting pains starting in left lateral breast  and into nipple; can last several minutes.  Also states that  left arm sometimes feels achey but does not notice any edema in left arm. Increased pain when lying on left side..  Pt states she has been wearing a sports bra recently but has not seen much improvement.      Pt denies heart and kidney disease.  Has hidradenitis which affects axilla.     General  Reason for Referral: lymphedema  Referred By:   Past Medical History Relevant to Rehab: l breast cancer, hidradenitis,  7 hernia repairs    Precautions  STEADI Fall Risk Score (The score of 4 or more indicates an increased risk of falling): 0  Precautions Comment: lymphedema     Pain:  Pain Assessment  Pain Assessment: 0-10  Pain Score: 6  Pain Location: Breast  Pain Orientation: Left     Prior Level of Function:  Pt is right handed and is independent with ADLs and mobility     Objective    General Assessments:  Strength B UE WFL  ROM B UE WFL throughout    UE girth:      Right        Left    Hand       18.4cm     18.5cm    Wrist       16.3cm     15.2cm    8cm         19.0cm     19.0cm     16cm       25.7cm     24.3cm    24cm       29.2cm     28.6cm    32cm       33.2cm     32.3cm    40cm       36.8cm     34.8cm  Left breast visibly larger than right.   Left breast skin coloring shows significant radiation damage; several small raised lumps (cysts?) present throughout left breast.  Scar left lateral breast well-healed and flat with good  mobility.  Scar near axilla tender to palpation, raised with moderate adhesions. No fibrosis  noted.    Outcome Measures:  Other Measures  Other Outcome Measures: LLIS 38/60     Treatments:  Therapeutic activities(25min)  Pt educated in clinical findings and POC;  lymphedema diagnosis, prognosis, intervention, risk-reduction strategies.  Pt instructed in and performed diaphragmatic breathing and self-MLD to cervical, axillary and inguinal lymph nodes.     Education  Individual(s) Educated: Patient  Education Provided: Lymphedema care, Home Exercise Program, POC  Risk and Benefits  Discussed with Patient/Caregiver/Other: yes  Patient/Caregiver Demonstrated Understanding: yes  Plan of Care Discussed and Agreed Upon: yes  Patient Response to Education: Patient/Caregiver Verbalized Understanding of Information, Patient/Caregiver Performed Return Demonstration of Exercises/Activities    Goals:  Active       PT Problem       PT Goal 1       Start:  11/03/23    Expected End:  12/03/23       Pt independent with lymphedema risk-reduction strategies         PT Goal 2       Start:  11/03/23    Expected End:  01/02/24       Improve LLIS to no more than 15% impairment         PT Goal 3       Start:  11/03/23    Expected End:  01/02/24       Pt independent with lymphedema self-care/self-management strategies         PT Goal 4       Start:  11/03/23    Expected End:  01/02/24       Reduce frequency and intensity of pain in left breast by at least 60%

## 2023-11-03 NOTE — Clinical Note
November 3, 2023     Patient: Sharonda Yeboah   YOB: 1970   Date of Visit: 11/3/2023       To Whom it May Concern:    Sharonda Yeboah was seen in my clinic on 11/3/2023. She {Return to school/sport:09399}.    If you have any questions or concerns, please don't hesitate to call.         Sincerely,          Sherly Beatty, PT        CC: No Recipients

## 2023-11-06 ENCOUNTER — TELEPHONE (OUTPATIENT)
Dept: ADMISSION | Facility: HOSPITAL | Age: 53
End: 2023-11-06
Payer: COMMERCIAL

## 2023-11-06 NOTE — TELEPHONE ENCOUNTER
Pt is having an angiogram done on 12/5 at [facility], per orders Dr. Haque, neurology.   Pt needs to have a letter acknowledging she will need to stop eliquis  3 days prior to procedure and then restart day after procedure.   Pt also would like to know when she will have scans (MRI) of lower extremities completed to determine when she can stop the eliquis.     Pt would like to  letter at , main [facility], tomorrow. Offered to have letter emailed, she declined.

## 2023-11-06 NOTE — TELEPHONE ENCOUNTER
Pt updated- MASON Leger RN - printed letter and placed in an envelope at [facility] desk.   Pt verified FUV 12/7 with Dr Hamlin.

## 2023-12-07 ENCOUNTER — APPOINTMENT (OUTPATIENT)
Dept: HEMATOLOGY/ONCOLOGY | Facility: HOSPITAL | Age: 53
End: 2023-12-07
Payer: COMMERCIAL

## 2023-12-13 ENCOUNTER — TELEPHONE (OUTPATIENT)
Dept: RADIATION ONCOLOGY | Facility: HOSPITAL | Age: 53
End: 2023-12-13
Payer: COMMERCIAL

## 2023-12-13 NOTE — TELEPHONE ENCOUNTER
Called pt to remind of appointment on 12/14/23 at 10:00 Pt confirmed appointment.     Benny Claudio MA

## 2023-12-14 ENCOUNTER — SOCIAL WORK (OUTPATIENT)
Dept: CASE MANAGEMENT | Facility: HOSPITAL | Age: 53
End: 2023-12-14

## 2023-12-14 ENCOUNTER — HOSPITAL ENCOUNTER (OUTPATIENT)
Dept: RADIATION ONCOLOGY | Facility: HOSPITAL | Age: 53
Setting detail: RADIATION/ONCOLOGY SERIES
Discharge: HOME | End: 2023-12-14
Payer: COMMERCIAL

## 2023-12-14 VITALS
WEIGHT: 187.5 LBS | HEART RATE: 81 BPM | TEMPERATURE: 97.3 F | HEIGHT: 64 IN | SYSTOLIC BLOOD PRESSURE: 121 MMHG | RESPIRATION RATE: 18 BRPM | BODY MASS INDEX: 32.01 KG/M2 | DIASTOLIC BLOOD PRESSURE: 75 MMHG | OXYGEN SATURATION: 96 %

## 2023-12-14 DIAGNOSIS — C77.3 BREAST CANCER METASTASIZED TO AXILLARY LYMPH NODE, LEFT (MULTI): Primary | ICD-10-CM

## 2023-12-14 DIAGNOSIS — C50.912 BREAST CANCER METASTASIZED TO AXILLARY LYMPH NODE, LEFT (MULTI): Primary | ICD-10-CM

## 2023-12-14 DIAGNOSIS — I97.2 LYMPHEDEMA SYNDROME, POSTMASTECTOMY: ICD-10-CM

## 2023-12-14 PROCEDURE — 99213 OFFICE O/P EST LOW 20 MIN: CPT | Performed by: NURSE PRACTITIONER

## 2023-12-14 ASSESSMENT — PATIENT HEALTH QUESTIONNAIRE - PHQ9
3. TROUBLE FALLING OR STAYING ASLEEP OR SLEEPING TOO MUCH: NEARLY EVERY DAY
1. LITTLE INTEREST OR PLEASURE IN DOING THINGS: NEARLY EVERY DAY
4. FEELING TIRED OR HAVING LITTLE ENERGY: NEARLY EVERY DAY
SUM OF ALL RESPONSES TO PHQ9 QUESTIONS 1 AND 2: 6
2. FEELING DOWN, DEPRESSED OR HOPELESS: NEARLY EVERY DAY
5. POOR APPETITE OR OVEREATING: NEARLY EVERY DAY
7. TROUBLE CONCENTRATING ON THINGS, SUCH AS READING THE NEWSPAPER OR WATCHING TELEVISION: NEARLY EVERY DAY
6. FEELING BAD ABOUT YOURSELF - OR THAT YOU ARE A FAILURE OR HAVE LET YOURSELF OR YOUR FAMILY DOWN: NOT AT ALL
9. THOUGHTS THAT YOU WOULD BE BETTER OFF DEAD, OR OF HURTING YOURSELF: NOT AT ALL
10. IF YOU CHECKED OFF ANY PROBLEMS, HOW DIFFICULT HAVE THESE PROBLEMS MADE IT FOR YOU TO DO YOUR WORK, TAKE CARE OF THINGS AT HOME, OR GET ALONG WITH OTHER PEOPLE: NOT DIFFICULT AT ALL
8. MOVING OR SPEAKING SO SLOWLY THAT OTHER PEOPLE COULD HAVE NOTICED. OR THE OPPOSITE, BEING SO FIGETY OR RESTLESS THAT YOU HAVE BEEN MOVING AROUND A LOT MORE THAN USUAL: NEARLY EVERY DAY
SUM OF ALL RESPONSES TO PHQ QUESTIONS 1-9: 21

## 2023-12-14 ASSESSMENT — ENCOUNTER SYMPTOMS
DEPRESSION: 1
OCCASIONAL FEELINGS OF UNSTEADINESS: 0
LOSS OF SENSATION IN FEET: 0

## 2023-12-14 ASSESSMENT — COLUMBIA-SUICIDE SEVERITY RATING SCALE - C-SSRS
2. HAVE YOU ACTUALLY HAD ANY THOUGHTS OF KILLING YOURSELF?: NO
1. IN THE PAST MONTH, HAVE YOU WISHED YOU WERE DEAD OR WISHED YOU COULD GO TO SLEEP AND NOT WAKE UP?: NO
6. HAVE YOU EVER DONE ANYTHING, STARTED TO DO ANYTHING, OR PREPARED TO DO ANYTHING TO END YOUR LIFE?: NO

## 2023-12-14 NOTE — PROGRESS NOTES
Referral received per CARLOS Vieira regarding resources for patient who is depressed and in need of community resources. 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 Patient is alert and oriented x3, verbal, pleasant and cooperative. Patient is a [employer] employee but not currently working. Both she and her daughter are on social security disability. Patient expressing that she is depressed and has been for many years. It is compounded by her multiple medical issues of cancer, etc.  Patient requesting counseling resources. The following social workers are being referred to her. In addition, patient is financially challenged and could benefit from some financial resources. SW has provided her with an application for the Breast cancer   [employer] [place].  also enlisting the help of Community Health Worker, Jeri Burnette, to assess other funds that patient may be eligible for.  SW provided patient with a $50.00 [employer] gift card during this visit to help her with essentials.    MILA Villalba    Social Work Counselors:  ONIEL Verde   Master Level Clinician   [address]  [place] [address]  [phone]      (Cell: [phone])          Kaitlin MARTELL  [address] [place] [address]   [phone]    Ms. Tiny ZARAGOZA  [facility]  At: [facility]  [address], [place] [address] ·   [phone]    Ms. Morrowly Baldo MARTELL  [address] [place] [address]  [phone]

## 2023-12-14 NOTE — PROGRESS NOTES
Radiation Oncology Follow-Up    Patient Name:  Sharonda Yeboah  MRN:  [id number]  :  1970    Referring Provider: No ref. 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     Care Team:  Surgery: Dr. Zora Reyes  Med Onc: Dr. Abebe Hamlin  Rad Onc: Dr. Noel Hawkins     Treatment Summary:      -21: Bilateral screening mammogram was negative.  -Palpated left axillary lump. Saw PCP.  -10/5/22: Diagnostic mammogram/US showed a 3.6 x 3.0 x 3.4 cm irregular bilobed mass at 2:00, 7 cm FN. Four abnormal left axillary LN’s seen. In the right axilla, a 4.1 cm superficial fatty mass noted, likely a lipoma.  -10/12/22: US-guided CNB confirmed IDC, grade 3, ER/AZ-negative Her2-negative (1+ IHC). A left axillary LN was positive for metastatic carcinoma.  -10/19/22: Breast MRI confirmed a 4.4 cm irregular mass with central necrosis in the left breast. Two bulky level 1 axillary LN’s seen (no level 2/3 LN’s identified). A peripheral rim-enhancing mass seen in left internal mammary region,  measuring 0.9 x 0.8 cm, concerning for malignant lymph node. Left axillary skin thickening seen.  -Neoadjuvant chemotherapy with KEYNOTE-522 regimen, 22-23. She required dose-reductions for SBO's that were managed conservatively.  -23: Left lumpectomy/SLNBx performed.  "pathology: pCR   -7/21/23 - 8/17/23: Radiation therapy to the left breast, SCV consisting of a dose of 42.56 Gy with additional 10 Gy to the tumor bed for a total of 52.56 Gy.      Treatment History:    Neoadjuvant AC-T/carboplatin with pembrolizumab (KEYNOTE-522):  Carboplatin/paclitaxel/pembrolizumab (weekly)  C1D1 11/4/22  C2D1 11/25/22  - Week #5 (C2D8) delayed due to ER visit for partial SBO at prior anastomotic site. Managed conservatively.  C3D1 12/23/22  C4D1 1/13/23  Doxorubicin/cyclophosphamide/pembrolizumab Q3 weeks   C1D1 2/3/23; Complicated by partial SBO, managed conservatively.  C2D1 2/24/23; dose-reduced 20%. Complicated by LLE DVT.  C3D1 3/16/23.  C4D1 4/7/23    SUBJECTIVE  History of Present Illness:   Sharonda Yeboah is here today for routine radiation follow up/surveillance visit.  She is doing well however continues to have tenderness with some swelling in left breast.  It is improving however and skin is overall intact. She suffers with long standing suppurative hydradenitis and sees dermatology routinely.  She is currently on antibiotics routinely. She wears a sports compression bra daily. She says the breast feels heavy. She continues PT for breast lymphedema massage. No swelling of left arm or difficulty with ROM.  Denies headaches,  fever, chills, cough, SOB, chest pain, N/V or bony pain. She is tearful today because she just had routine brain imaging s/p repair of right brain aneurysm 20 yrs ago and now has 2 small left aneurysms.  The plan right now is for close surveillance.  She is seen at [facility] for this.     Review of Systems:    Review of Systems   All other systems reviewed and are negative.      Performance Status:   The Karnofsky performance scale today is 90, Able to carry on normal activity; minor signs or symptoms of disease (ECOG equivalent 0).      OBJECTIVE  Vital Signs:  /75   Pulse 81   Temp 36.3 °C (97.3 °F) (Temporal)   Resp 18   Ht 1.626 m (5' 4\")   Wt 85 kg (187 " lb 8 oz)   SpO2 96%   BMI 32.18 kg/m²      Current Outpatient Medications:     apixaban (Eliquis) 5 mg tablet, Take 1 tablet (5 mg) by mouth twice a day., Disp: , Rfl:     benzoyl peroxide (Benzac AC) 10 % external wash, once daily. As directed leave on for 5 min then rinse, Disp: , Rfl:     biotin (Hair, Skin and Nails, biotin,) 10,000 mcg tablet,chewable, Chew., Disp: , Rfl:     calcium carbonate (Oscal) 500 mg calcium (1,250 mg) tablet, Take 1 tablet (1,250 mg) by mouth 2 times a day with meals., Disp: , Rfl:     doxycycline (Vibra-Tabs) 100 mg tablet, Take 1 tablet (100 mg) by mouth 2 times a day. Take with food and a full glass of water and do not lie down for at least 30 minutes after., Disp: 180 tablet, Rfl: 0    minocycline 100 mg capsule, TAKE 1 CAPSULE BY MOUTH TWICE DAILY WITH FOOD AND A GLASS OF WATER, Disp: , Rfl:     multivitamin tablet, Take 1 tablet by mouth once daily., Disp: , Rfl:     omeprazole (PriLOSEC) 40 mg DR capsule, Take 1 capsule (40 mg) by mouth once daily., Disp: , Rfl:     vitamin E 450 mg (1000 unit) capsule, Take by mouth once daily., Disp: , Rfl:      Physical Exam  Constitutional:       Appearance: Normal appearance.   HENT:      Head: Normocephalic and atraumatic.      Nose: Nose normal.      Mouth/Throat:      Pharynx: Oropharynx is clear.   Eyes:      Conjunctiva/sclera: Conjunctivae normal.      Pupils: Pupils are equal, round, and reactive to light.   Cardiovascular:      Rate and Rhythm: Normal rate and regular rhythm.      Heart sounds: Normal heart sounds.   Pulmonary:      Effort: Pulmonary effort is normal.      Breath sounds: Normal breath sounds.   Chest:   Breasts:     Right: Normal. No inverted nipple, mass or nipple discharge.      Left: Normal. No inverted nipple, mass or nipple discharge.      Comments: Left breast with well healed surgical incision.  Skin is intact with noted patchy pigmentation in radiation field. No suspicious palpable masses. Mild  swelling  Abdominal:      Palpations: Abdomen is soft.   Musculoskeletal:         General: No swelling. Normal range of motion.      Cervical back: Normal range of motion and neck supple.   Lymphadenopathy:      Cervical: No cervical adenopathy.      Upper Body:      Right upper body: No supraclavicular or axillary adenopathy.      Left upper body: No supraclavicular or axillary adenopathy.   Skin:     General: Skin is warm and dry.   Neurological:      General: No focal deficit present.      Mental Status: She is alert and oriented to person, place, and time.   Psychiatric:         Mood and Affect: Mood normal.         Behavior: Behavior normal.         RESULTS:  Narrative & Impression   Interpreted By:  Taisha Rahman and O'Connor Gregory   STUDY:  BI MAMMO BILATERAL DIAGNOSTIC TOMOSYNTHESIS; BI US BREAST LIMITED  LEFT;  10/24/2023 3:39 pm; 10/24/2023 4:04 pm      ACCESSION NUMBER(S):  [id number]; [id number]      ORDERING CLINICIAN:  OG QUAN      INDICATION:  Palpable left breast lump. History of left lumpectomy and radiation.  1st postoperative exam.      COMPARISON:  Specimen x-ray 05/24/2023, 10/05/2022, 08/24/2021, 05/19/2020      FINDINGS:  MAMMOGRAPHY: 2D and tomosynthesis images were reviewed at 1 mm slice  thickness.      Density:  There are areas of scattered fibroglandular tissue.      A metallic radiopaque marker was placed by the patient at the site of  her focal lump in the upper outer quadrant of the left breast at mid  depth. Postlumpectomy changes are seen deep to the radiopaque marker  with scarring and surgical clips. Subtle circumscribed fat containing  masses are also seen superficial to the lumpectomy site. Diffuse  moderate to marked trabecular and skin thickening throughout the left  breast consistent with post radiation change. Stable benign asymmetry  in the deep central right breast. No suspicious masses or  calcifications are identified in either breast.      ULTRASOUND:   Targeted ultrasound of the palpable area of concern in  the left breast was performed by a registered sonographer using  elastography. Two superficial heterogenous but predominantly  hyperechoic masses are seen at the 2 o'clock position, the mass 10 cm  from the nipple measures 1.1 x 1.2 x 1.0 cm and the mass 11 cm from  the nipple measures 1.2 x 1.2 x 1.2 cm. They are heterogenous on  elastography and are avascular. These correlate with the fat density  masses seen mammographically and are consistent with benign fat  necrosis.      IMPRESSION:  No mammographic evidence of malignancy. New postoperative and  postradiation changes, left breast. Benign-appearing fat necrosis  correlating with the area of palpable concern.      BI-RADS CATEGORY:      BI-RADS Category:  2 Benign.  Recommendation:  Follow-up as recommended or needed.  Recommended Date:  1 Year.  Laterality:  Bilateral.         ASSESSMENT/PLAN:  53 y.o. female with left breast cancer s/p neoadjuvant chemotherapy followed by breast conserving surgery followed by radiation.  Doing well and breast continues healing.  She continues PT for breast lymphedema wears compression bra.  Radiation follow up in 6 mo.  I have referred her to SW today for depression and recommendation for therapist. Call with any questions or concerns.     Angelina Zambrano CNP  [phone]

## 2023-12-18 ENCOUNTER — DOCUMENTATION (OUTPATIENT)
Dept: PHYSICAL THERAPY | Facility: CLINIC | Age: 53
End: 2023-12-18
Payer: COMMERCIAL

## 2023-12-18 ENCOUNTER — APPOINTMENT (OUTPATIENT)
Dept: PHYSICAL THERAPY | Facility: CLINIC | Age: 53
End: 2023-12-18
Payer: COMMERCIAL

## 2023-12-18 NOTE — PROGRESS NOTES
Physical Therapy                 Therapy Communication Note    Patient Name: Sharonda Yeboah  MRN: [id number]  Today's Date: 12/18/2023     Discipline: Physical Therapy    Missed Visit Reason:      Missed Time: Cancel    Comment: Pt currently hospitalized

## 2023-12-22 ENCOUNTER — TREATMENT (OUTPATIENT)
Dept: PHYSICAL THERAPY | Facility: CLINIC | Age: 53
End: 2023-12-22
Payer: COMMERCIAL

## 2023-12-22 DIAGNOSIS — I97.2 POSTMASTECTOMY LYMPHEDEMA SYNDROME: ICD-10-CM

## 2023-12-22 DIAGNOSIS — I97.2 LYMPHEDEMA SYNDROME, POSTMASTECTOMY: Primary | ICD-10-CM

## 2023-12-22 PROCEDURE — 97140 MANUAL THERAPY 1/> REGIONS: CPT | Mod: GP

## 2023-12-22 ASSESSMENT — PAIN - FUNCTIONAL ASSESSMENT: PAIN_FUNCTIONAL_ASSESSMENT: 0-10

## 2023-12-22 ASSESSMENT — PAIN SCALES - GENERAL: PAINLEVEL_OUTOF10: 0 - NO PAIN

## 2023-12-22 NOTE — PROGRESS NOTES
Physical Therapy    Physical Therapy Treatment    Patient Name: Sharonda Yeboah  MRN: [id number]  Today's Date: 12/22/2023  Time Calculation  Start Time: 1100  Stop Time: 1145  Time Calculation (min): 45 min  Visit 2/12    Assessment:  PT Assessment  Assessment Comment: Pt tolerated MLD well.  No c/o pain or discomfort throughout. Mild fibrosis on underside of breast but otherwise breast soft.  Plan:   Cont per POC    Current Problem  1. Lymphedema syndrome, postmastectomy        2. Postmastectomy lymphedema syndrome  Follow Up In Physical Therapy        Subjective    Pt states that she was hospitalized recently (from 12/16 to 1219) for SBO.  States that she has a hernia and will need surgery to repair soon. Also ans 2 brain aneurysms. Left breast feels about the same. Usually wears a sports bra     Precautions  Precautions Comment: lymphedema    Pain Assessment  Pain Assessment: 0-10  Pain Score: 0 - No pain    Objective   Left breast visibly larger than right; tissue soft with mild fibrosis on underside.    Treatments:  Manual Therapy (40min)  --manual lymph drainage secondary left UE protocol with emphasis on truncal decongestion, parasternal lymph node stimulation. No abdominal sequence due to hernia; pt performed diaphragmatic breathing.    OP EDUCATION:  Outpatient Education  Individual(s) Educated: Patient  Education Provided: Lymphedema care    Goals:  Active       PT Problem       PT Goal 1       Start:  11/03/23    Expected End:  12/03/23       Pt independent with lymphedema risk-reduction strategies         PT Goal 2       Start:  11/03/23    Expected End:  01/02/24       Improve LLIS to no more than 15% impairment         PT Goal 3       Start:  11/03/23    Expected End:  01/02/24       Pt independent with lymphedema self-care/self-management strategies         PT Goal 4       Start:  11/03/23    Expected End:  01/02/24       Reduce frequency and intensity of pain in left breast by at least 60%

## 2023-12-26 ENCOUNTER — TREATMENT (OUTPATIENT)
Dept: PHYSICAL THERAPY | Facility: CLINIC | Age: 53
End: 2023-12-26
Payer: COMMERCIAL

## 2023-12-26 DIAGNOSIS — I97.2 LYMPHEDEMA SYNDROME, POSTMASTECTOMY: Primary | ICD-10-CM

## 2023-12-26 DIAGNOSIS — I97.2 POSTMASTECTOMY LYMPHEDEMA SYNDROME: ICD-10-CM

## 2023-12-26 PROCEDURE — 97530 THERAPEUTIC ACTIVITIES: CPT | Mod: GP

## 2023-12-26 PROCEDURE — 97140 MANUAL THERAPY 1/> REGIONS: CPT | Mod: GP

## 2023-12-26 ASSESSMENT — PAIN - FUNCTIONAL ASSESSMENT: PAIN_FUNCTIONAL_ASSESSMENT: 0-10

## 2023-12-26 ASSESSMENT — PAIN SCALES - GENERAL: PAINLEVEL_OUTOF10: 0 - NO PAIN

## 2023-12-26 NOTE — PROGRESS NOTES
"Physical Therapy    Physical Therapy Treatment    Patient Name: Sharonda Yeboah  MRN: [id number]  Today's Date: 12/26/2023  Time Calculation  Start Time: 1100  Stop Time: 1155  Time Calculation (min): 55 min  Visit 3/12    Assessment:  PT Assessment  Assessment Comment: Pt is progressing well.  Pt is noticing improvements in tissue texture.  Pt is also very receptive to learning self-care strategies and is compliant with education. No c/o pain after treatment    Plan:   Cont per POC    Current Problem  1. Lymphedema syndrome, postmastectomy        2. Postmastectomy lymphedema syndrome  Follow Up In Physical Therapy        Subjective    Pt reports left breast has been feeling \"less heavy\" recently. Has been trying to do self MLD and wears sports bra.    Precautions  Precautions Comment: lymphedema    Pain Assessment  Pain Assessment: 0-10  Pain Score: 0 - No pain    Objective   Scar left lateral breast ~1\" in length; flat with min adhesions    Treatments:  Manual Therapy (48min)  --manual lymph drainage secondary left UE protocol with emphasis on truncal decongestion, parasternal lymph node stimulation. No abdominal sequence due to hernia; pt performed diaphragmatic breathing.  --scar massage to scar on left lateral breast.  Pt instructed in self scar massage    Therapeutic activities (5min)  --Pt educated in purpose and benefits of compression.  Discussed compression bras. Pt shown sample of [employer] bra.    OP EDUCATION:  Outpatient Education  Individual(s) Educated: Patient  Education Provided: Lymphedema care    Goals:  Active       PT Problem       PT Goal 1 (Progressing)       Start:  11/03/23    Expected End:  02/24/24       Pt independent with lymphedema risk-reduction strategies         PT Goal 2 (Progressing)       Start:  11/03/23    Expected End:  02/24/24       Improve LLIS to no more than 15% impairment         PT Goal 3 (Progressing)       Start:  11/03/23    Expected End:  01/02/24       Pt " independent with lymphedema self-care/self-management strategies         PT Goal 4 (Progressing)       Start:  11/03/23    Expected End:  01/02/24       Reduce frequency and intensity of pain in left breast by at least 60%

## 2023-12-28 ENCOUNTER — TELEPHONE (OUTPATIENT)
Dept: PRIMARY CARE | Facility: CLINIC | Age: 53
End: 2023-12-28
Payer: COMMERCIAL

## 2024-01-04 ENCOUNTER — APPOINTMENT (OUTPATIENT)
Dept: HEMATOLOGY/ONCOLOGY | Facility: HOSPITAL | Age: 54
End: 2024-01-04
Payer: COMMERCIAL

## 2024-01-04 ENCOUNTER — OFFICE VISIT (OUTPATIENT)
Dept: HEMATOLOGY/ONCOLOGY | Facility: HOSPITAL | Age: 54
End: 2024-01-04
Payer: COMMERCIAL

## 2024-01-04 VITALS
HEART RATE: 88 BPM | BODY MASS INDEX: 32.14 KG/M2 | HEIGHT: 64 IN | TEMPERATURE: 97.2 F | OXYGEN SATURATION: 99 % | WEIGHT: 188.27 LBS | SYSTOLIC BLOOD PRESSURE: 130 MMHG | RESPIRATION RATE: 18 BRPM | DIASTOLIC BLOOD PRESSURE: 79 MMHG

## 2024-01-04 DIAGNOSIS — D68.59 THROMBOPHILIA (MULTI): ICD-10-CM

## 2024-01-04 DIAGNOSIS — C77.3 BREAST CANCER METASTASIZED TO AXILLARY LYMPH NODE, LEFT (MULTI): ICD-10-CM

## 2024-01-04 DIAGNOSIS — Z82.49 FAMILY HISTORY OF BLOOD CLOTS: ICD-10-CM

## 2024-01-04 DIAGNOSIS — I97.2 LYMPHEDEMA SYNDROME, POSTMASTECTOMY: ICD-10-CM

## 2024-01-04 DIAGNOSIS — R06.02 SHORTNESS OF BREATH: ICD-10-CM

## 2024-01-04 DIAGNOSIS — C50.912 BREAST CANCER METASTASIZED TO AXILLARY LYMPH NODE, LEFT (MULTI): ICD-10-CM

## 2024-01-04 DIAGNOSIS — I82.4Z2 ACUTE DEEP VEIN THROMBOSIS (DVT) OF DISTAL VEIN OF LEFT LOWER EXTREMITY (MULTI): Primary | ICD-10-CM

## 2024-01-04 PROCEDURE — 3008F BODY MASS INDEX DOCD: CPT | Performed by: INTERNAL MEDICINE

## 2024-01-04 PROCEDURE — 1036F TOBACCO NON-USER: CPT | Performed by: INTERNAL MEDICINE

## 2024-01-04 PROCEDURE — 99215 OFFICE O/P EST HI 40 MIN: CPT | Performed by: INTERNAL MEDICINE

## 2024-01-04 RX ORDER — DULOXETIN HYDROCHLORIDE 20 MG/1
20 CAPSULE, DELAYED RELEASE ORAL DAILY
COMMUNITY
End: 2024-04-04 | Stop reason: WASHOUT

## 2024-01-04 RX ORDER — HEPARIN 100 UNIT/ML
500 SYRINGE INTRAVENOUS AS NEEDED
Status: CANCELLED | OUTPATIENT
Start: 2024-01-05

## 2024-01-04 RX ORDER — HEPARIN SODIUM,PORCINE/PF 10 UNIT/ML
50 SYRINGE (ML) INTRAVENOUS AS NEEDED
OUTPATIENT
Start: 2024-01-05

## 2024-01-04 RX ORDER — TRAMADOL HYDROCHLORIDE 50 MG/1
50 TABLET ORAL 3 TIMES DAILY PRN
Qty: 30 TABLET | Refills: 0 | Status: SHIPPED | OUTPATIENT
Start: 2024-01-04 | End: 2024-01-14

## 2024-01-04 ASSESSMENT — PAIN SCALES - GENERAL: PAINLEVEL: 6

## 2024-01-05 ENCOUNTER — LAB (OUTPATIENT)
Dept: HEMATOLOGY/ONCOLOGY | Facility: HOSPITAL | Age: 54
End: 2024-01-05
Payer: COMMERCIAL

## 2024-01-05 ENCOUNTER — HOSPITAL ENCOUNTER (OUTPATIENT)
Dept: VASCULAR MEDICINE | Facility: HOSPITAL | Age: 54
Discharge: HOME | End: 2024-01-05
Payer: COMMERCIAL

## 2024-01-05 DIAGNOSIS — Z82.49 FAMILY HISTORY OF BLOOD CLOTS: ICD-10-CM

## 2024-01-05 DIAGNOSIS — C77.3 BREAST CANCER METASTASIZED TO AXILLARY LYMPH NODE, LEFT (MULTI): ICD-10-CM

## 2024-01-05 DIAGNOSIS — C50.912 BREAST CANCER METASTASIZED TO AXILLARY LYMPH NODE, LEFT (MULTI): ICD-10-CM

## 2024-01-05 DIAGNOSIS — I82.4Z2 ACUTE DEEP VEIN THROMBOSIS (DVT) OF DISTAL VEIN OF LEFT LOWER EXTREMITY (MULTI): ICD-10-CM

## 2024-01-05 DIAGNOSIS — I82.409 ACUTE EMBOLISM AND THROMBOSIS OF UNSPECIFIED DEEP VEINS OF UNSPECIFIED LOWER EXTREMITY (MULTI): ICD-10-CM

## 2024-01-05 DIAGNOSIS — D68.59 THROMBOPHILIA (MULTI): ICD-10-CM

## 2024-01-05 LAB
ALBUMIN SERPL BCP-MCNC: 3.7 G/DL (ref 3.4–5)
ALP SERPL-CCNC: 92 U/L (ref 33–110)
ALT SERPL W P-5'-P-CCNC: 9 U/L (ref 7–45)
ANION GAP SERPL CALC-SCNC: 12 MMOL/L (ref 10–20)
APTT PPP: 35 SECONDS (ref 27–38)
AST SERPL W P-5'-P-CCNC: 10 U/L (ref 9–39)
B2 GLYCOPROT1 IGA SER-ACNC: <0.6 U/ML
B2 GLYCOPROT1 IGG SER-ACNC: <1.4 U/ML
B2 GLYCOPROT1 IGM SER-ACNC: <0.2 U/ML
BASOPHILS # BLD AUTO: 0.02 X10*3/UL (ref 0–0.1)
BASOPHILS NFR BLD AUTO: 0.4 %
BILIRUB SERPL-MCNC: 0.4 MG/DL (ref 0–1.2)
BUN SERPL-MCNC: 12 MG/DL (ref 6–23)
CALCIUM SERPL-MCNC: 9.6 MG/DL (ref 8.6–10.3)
CARDIOLIPIN IGA SERPL-ACNC: <0.5 APL U/ML
CARDIOLIPIN IGG SER IA-ACNC: <1.6 GPL U/ML
CARDIOLIPIN IGM SER IA-ACNC: <0.2 MPL U/ML
CHLORIDE SERPL-SCNC: 105 MMOL/L (ref 98–107)
CO2 SERPL-SCNC: 27 MMOL/L (ref 21–32)
CREAT SERPL-MCNC: 0.68 MG/DL (ref 0.5–1.05)
D DIMER PPP FEU-MCNC: <215 NG/ML FEU
EOSINOPHIL # BLD AUTO: 0.05 X10*3/UL (ref 0–0.7)
EOSINOPHIL NFR BLD AUTO: 0.9 %
ERYTHROCYTE [DISTWIDTH] IN BLOOD BY AUTOMATED COUNT: 16.5 % (ref 11.5–14.5)
GFR SERPL CREATININE-BSD FRML MDRD: >90 ML/MIN/1.73M*2
GLUCOSE SERPL-MCNC: 107 MG/DL (ref 74–99)
HCT VFR BLD AUTO: 33 % (ref 36–46)
HGB BLD-MCNC: 10.6 G/DL (ref 12–16)
IMM GRANULOCYTES # BLD AUTO: 0.02 X10*3/UL (ref 0–0.7)
IMM GRANULOCYTES NFR BLD AUTO: 0.4 % (ref 0–0.9)
INR PPP: 1.2 (ref 0.9–1.1)
LYMPHOCYTES # BLD AUTO: 0.9 X10*3/UL (ref 1.2–4.8)
LYMPHOCYTES NFR BLD AUTO: 16.9 %
MCH RBC QN AUTO: 27.4 PG (ref 26–34)
MCHC RBC AUTO-ENTMCNC: 32.1 G/DL (ref 32–36)
MCV RBC AUTO: 85 FL (ref 80–100)
MONOCYTES # BLD AUTO: 0.44 X10*3/UL (ref 0.1–1)
MONOCYTES NFR BLD AUTO: 8.3 %
NEUTROPHILS # BLD AUTO: 3.9 X10*3/UL (ref 1.2–7.7)
NEUTROPHILS NFR BLD AUTO: 73.1 %
NRBC BLD-RTO: 0 /100 WBCS (ref 0–0)
PLATELET # BLD AUTO: 280 X10*3/UL (ref 150–450)
POTASSIUM SERPL-SCNC: 3.9 MMOL/L (ref 3.5–5.3)
PROT SERPL-MCNC: 6.4 G/DL (ref 6.4–8.2)
PROTHROMBIN TIME: 13.7 SECONDS (ref 9.8–12.8)
RBC # BLD AUTO: 3.87 X10*6/UL (ref 4–5.2)
SODIUM SERPL-SCNC: 140 MMOL/L (ref 136–145)
WBC # BLD AUTO: 5.3 X10*3/UL (ref 4.4–11.3)

## 2024-01-05 PROCEDURE — 85025 COMPLETE CBC W/AUTO DIFF WBC: CPT

## 2024-01-05 PROCEDURE — 93971 EXTREMITY STUDY: CPT

## 2024-01-05 PROCEDURE — 85730 THROMBOPLASTIN TIME PARTIAL: CPT

## 2024-01-05 PROCEDURE — 96523 IRRIG DRUG DELIVERY DEVICE: CPT

## 2024-01-05 PROCEDURE — 86147 CARDIOLIPIN ANTIBODY EA IG: CPT

## 2024-01-05 PROCEDURE — 80053 COMPREHEN METABOLIC PANEL: CPT

## 2024-01-05 PROCEDURE — 85610 PROTHROMBIN TIME: CPT

## 2024-01-05 PROCEDURE — 93971 EXTREMITY STUDY: CPT | Performed by: INTERNAL MEDICINE

## 2024-01-05 PROCEDURE — 36591 DRAW BLOOD OFF VENOUS DEVICE: CPT

## 2024-01-05 PROCEDURE — 2500000004 HC RX 250 GENERAL PHARMACY W/ HCPCS (ALT 636 FOR OP/ED): Performed by: INTERNAL MEDICINE

## 2024-01-05 PROCEDURE — 85379 FIBRIN DEGRADATION QUANT: CPT

## 2024-01-05 PROCEDURE — 86146 BETA-2 GLYCOPROTEIN ANTIBODY: CPT

## 2024-01-05 RX ORDER — HEPARIN 100 UNIT/ML
500 SYRINGE INTRAVENOUS AS NEEDED
OUTPATIENT
Start: 2024-01-05

## 2024-01-05 RX ORDER — HEPARIN SODIUM,PORCINE/PF 10 UNIT/ML
50 SYRINGE (ML) INTRAVENOUS AS NEEDED
OUTPATIENT
Start: 2024-01-05

## 2024-01-05 RX ORDER — HEPARIN 100 UNIT/ML
500 SYRINGE INTRAVENOUS AS NEEDED
Status: DISCONTINUED | OUTPATIENT
Start: 2024-01-05 | End: 2024-01-05 | Stop reason: HOSPADM

## 2024-01-05 RX ADMIN — HEPARIN 500 UNITS: 100 SYRINGE at 11:12

## 2024-01-07 NOTE — PROGRESS NOTES
DIVISION OF MEDICAL ONCOLOGY    Patient ID: Sharonda Yeboah is a 53 y.o. female.  MRN: [id number]  : 1970  The patient presents to clinic today for her history of left-sided breast cancer.    Diagnostic/Therapeutic History:  -21: Bilateral screening mammogram was negative.  -Palpated left axillary lump. Saw PCP.  -10/5/22: Diagnostic mammogram/US showed a 3.6 x 3.0 x 3.4 cm irregular bilobed mass at 2:00, 7 cm FN. Four abnormal left axillary LN’s seen. In the right axilla, a 4.1 cm superficial fatty mass noted, likely a lipoma.  -10/12/22: US-guided CNB confirmed IDC, grade 3, ER/MA-negative Her2-negative (1+ IHC). A left axillary LN was positive for metastatic carcinoma.  -10/19/22: Breast MRI confirmed a 4.4 cm irregular mass with central necrosis in the left breast. Two bulky level 1 axillary LN’s seen (no level 2/3 LN’s identified). A peripheral rim-enhancing mass seen in left internal mammary region,  measuring 0.9 x 0.8 cm, concerning for malignant lymph node. Left axillary skin thickening seen.  -Neoadjuvant chemotherapy with KEYNOTE-522 regimen, 22-23. She required dose-reductions for SBO's that were managed conservatively.  -23: Left lumpectomy/SLNBx performed. pathology: pCR to therapy.  -Received 3 doses of adjuvant pembrolizumab, but decided to stop for abdominal symptoms and diarrhea.  -23 - 23: Radiation therapy to the left breast, SCV consisting of a dose of 42.56 Gy with additional 10 Gy to the tumor bed for a total of 52.56 Gy.     History of Present Illness (HPI)/Interval History:  Sharonda Yeboah presents today for routine FUV.  She was unfortunately admitted recently with another SBO- managed conservatively.  No lingering abdominal symptoms.  No LE edema, palpitations, dyspnea, cough.  Remains on Eliquis- no reported bleeding.  Her mother was just evaluated and found to have PE's, possibly from COVID.  She would like a CT Chest, in addition to her LE Dopplers,  which were planned.  Her left breast continues to bother her- lymphedema is not improving. She is trying to get fitted for a bra.    Review of Systems:  14-point ROS otherwise negative, as per HPI/Interval History.    Past Medical History:   Diagnosis Date    Abnormal levels of other serum enzymes 04/07/2020    Elevated alkaline phosphatase level    Acute candidiasis of vulva and vagina 12/23/2019    Vaginal candidiasis    Breast cancer (CMS/HCC) 05/01/2023    left lumpectomy w/rad & chemo    Change in bowel habit 07/19/2017    Encounter for diagnostic colonoscopy due to change in bowel habits    Contact with and (suspected) exposure to potentially hazardous body fluids 07/25/2017    History of exposure to hazardous bodily fluids    Dorsalgia, unspecified 06/17/2015    Acute back pain    Encounter for follow-up examination after completed treatment for conditions other than malignant neoplasm 02/17/2018    Hospital discharge follow-up    Encounter for screening for respiratory tuberculosis 07/25/2017    Encounter for screening for respiratory tuberculosis    Endometriosis, unspecified 10/18/2022    Endometriosis    Fever, unspecified 12/28/2021    Fever and chills    Functional intestinal disorder, unspecified 10/18/2022    Functional bowel disorder    Generalized abdominal pain 06/06/2017    Generalized abdominal pain    Hx antineoplastic chemo     Low back pain, unspecified 10/18/2022    Lumbar pain    Lymphedema syndrome, postmastectomy 11/03/2023    Other conditions influencing health status     Arthritis    Other conditions influencing health status     History of dyspareunia    Other specified cough 09/25/2019    Productive cough    Pain in left knee 12/29/2016    Left knee pain    Personal history of diseases of the blood and blood-forming organs and certain disorders involving the immune mechanism 07/25/2017    History of anemia    Personal history of diseases of the blood and blood-forming organs and  certain disorders involving the immune mechanism 07/25/2017    History of anemia    Personal history of diseases of the skin and subcutaneous tissue 06/06/2017    History of eczema    Personal history of irradiation     Personal history of other diseases of the digestive system 07/19/2017    History of constipation    Personal history of other diseases of the digestive system     History of esophageal reflux    Personal history of other diseases of the musculoskeletal system and connective tissue 02/16/2017    History of neck pain    Personal history of other diseases of the musculoskeletal system and connective tissue 01/19/2017    History of back pain    Personal history of other diseases of the nervous system and sense organs     History of migraine headaches    Personal history of other diseases of the respiratory system 12/31/2020    History of acute bacterial sinusitis    Personal history of other diseases of the respiratory system 01/21/2021    History of sore throat    Personal history of other diseases of the respiratory system 06/04/2019    History of acute bronchitis    Personal history of other mental and behavioral disorders 10/18/2022    History of depression    Personal history of other specified conditions 06/06/2017    History of exertional chest pain    Personal history of other specified conditions 08/02/2017    History of abdominal pain    Personal history of other specified conditions 10/23/2020    History of fatigue    Personal history of other specified conditions 07/25/2017    History of fatigue    Personal history of other specified conditions 02/17/2018    History of diarrhea    Plantar fascial fibromatosis 03/22/2014    Plantar fasciitis, right    Sprain of unspecified rotator cuff capsule, initial encounter 10/18/2022    Rotator cuff (capsule) sprain    Tobacco abuse counseling 07/25/2017    Encounter for tobacco use cessation counseling     Patient Active Problem List   Diagnosis     Breast cancer metastasized to axillary lymph node, left (CMS/HCC)    Carpal tunnel syndrome, right    Cervical spinal stenosis    Goiter    Hidradenitis suppurativa    Mass of axillary tail of left breast    Uterine fibroid    Vasomotor symptoms due to menopause    Vitamin D deficiency    Medicare annual wellness visit, subsequent    Acute deep vein thrombosis (DVT) of distal vein of left lower extremity (CMS/HCC)    Status post partial mastectomy of left breast    S/P radiation therapy    S/P chemotherapy, time since greater than 12 weeks    Lymphedema syndrome, postmastectomy        Past Surgical History:   Procedure Laterality Date    BREAST LUMPECTOMY Left 2023    left lumpectomy w/rad & chemo     SECTION, CLASSIC  10/18/2022     Section    CHOLECYSTECTOMY  2013    Cholecystectomy    CT ABDOMEN PELVIS ANGIOGRAM W AND/OR WO IV CONTRAST  2023    CT ABDOMEN PELVIS ANGIOGRAM W AND/OR WO IV CONTRAST [facility] CT    HERNIA REPAIR  10/18/2022    Hernia Repair    HYSTEROSCOPY  2014    Hysteroscopy With Endometrial Ablation    OTHER SURGICAL HISTORY  2013    Laparoscopic Appendectomy Incidental    OTHER SURGICAL HISTORY  2013    Craniotomy (Therapeutic)    OTHER SURGICAL HISTORY  2013    Arthrotomy Of Knee With Lateral Meniscectomy    OTHER SURGICAL HISTORY  10/18/2022    Intracranial Aneurysm Repair    ROTATOR CUFF REPAIR  10/18/2022    Rotator Cuff Repair    SHOULDER SURGERY  2014    Shoulder Surgery Right    TUBAL LIGATION  10/18/2022    Tubal Ligation    UMBILICAL HERNIA REPAIR  2013    Umbilical Hernia Repair       Social History     Socioeconomic History    Marital status: Single     Spouse name: Not on file    Number of children: 4    Years of education: Not on file    Highest education level: Not on file   Occupational History    Not on file   Tobacco Use    Smoking status: Former     Years: 5     Types: Cigarettes     Quit date: 10/1/2022     Years  since quittin.2    Smokeless tobacco: Never   Substance and Sexual Activity    Alcohol use: Not Currently    Drug use: Never    Sexual activity: Not Currently     Birth control/protection: Post-menopausal   Other Topics Concern    Not on file   Social History Narrative    Not on file     Social Determinants of Health     Financial Resource Strain: Not on file   Food Insecurity: Not on file   Transportation Needs: Not on file   Physical Activity: Not on file   Stress: Not on file   Social Connections: Not on file   Intimate Partner Violence: Not on file   Housing Stability: Not on file       Family History   Problem Relation Name Age of Onset    Hypertension Mother      Diabetes type II Mother         Allergies:   No Known Allergies      Current Outpatient Medications:     apixaban (Eliquis) 5 mg tablet, Take 1 tablet (5 mg) by mouth twice a day., Disp: , Rfl:     benzoyl peroxide (Benzac AC) 10 % external wash, once daily. As directed leave on for 5 min then rinse, Disp: , Rfl:     biotin (Hair, Skin and Nails, biotin,) 10,000 mcg tablet,chewable, Chew., Disp: , Rfl:     calcium carbonate (Oscal) 500 mg calcium (1,250 mg) tablet, Take 1 tablet (1,250 mg) by mouth 2 times a day with meals., Disp: , Rfl:     doxycycline (Vibra-Tabs) 100 mg tablet, Take 1 tablet (100 mg) by mouth 2 times a day. Take with food and a full glass of water and do not lie down for at least 30 minutes after., Disp: 180 tablet, Rfl: 0    DULoxetine (Cymbalta) 20 mg DR capsule, Take 1 capsule (20 mg) by mouth once daily. Do not crush or chew., Disp: , Rfl:     multivitamin tablet, Take 1 tablet by mouth once daily., Disp: , Rfl:     omeprazole (PriLOSEC) 40 mg DR capsule, Take 1 capsule (40 mg) by mouth once daily., Disp: , Rfl:     vitamin E 450 mg (1000 unit) capsule, Take by mouth once daily., Disp: , Rfl:     minocycline 100 mg capsule, TAKE 1 CAPSULE BY MOUTH TWICE DAILY WITH FOOD AND A GLASS OF WATER, Disp: , Rfl:     traMADol  "(Ultram) 50 mg tablet, Take 1 tablet (50 mg) by mouth 3 times a day as needed for severe pain (7 - 10) for up to 10 days., Disp: 30 tablet, Rfl: 0     Objective    BSA: 1.96 meters squared  /79   Pulse 88   Temp 36.2 °C (97.2 °F) (Temporal)   Resp 18   Ht 1.626 m (5' 4.02\")   Wt 85.4 kg (188 lb 4.4 oz)   SpO2 99%   BMI 32.30 kg/m²     ECOG Performance Status:  The ECOG performance scale today is ECO- Restricted in physically strenuous activity.  Carries out light duty.    Physical Exam  Vitals reviewed.   Constitutional:       General: She is not in acute distress.     Appearance: Normal appearance. She is not ill-appearing or diaphoretic.   HENT:      Head: Normocephalic and atraumatic.   Eyes:      General: No scleral icterus.     Conjunctiva/sclera: Conjunctivae normal.   Cardiovascular:      Rate and Rhythm: Normal rate and regular rhythm.   Pulmonary:      Effort: Pulmonary effort is normal. No respiratory distress.      Breath sounds: Normal breath sounds. No wheezing.   Chest:   Breasts:     Left: Skin change and tenderness present. No bleeding or mass.      Comments: Left breast lymphedema noted.  Musculoskeletal:      Cervical back: Normal range of motion and neck supple. No tenderness.   Lymphadenopathy:      Cervical: No cervical adenopathy.      Upper Body:      Right upper body: No supraclavicular adenopathy.      Left upper body: No supraclavicular adenopathy.   Skin:     General: Skin is warm and dry.      Coloration: Skin is not jaundiced.      Findings: No rash.   Neurological:      General: No focal deficit present.      Mental Status: She is alert and oriented to person, place, and time. Mental status is at baseline.      Gait: Gait normal.   Psychiatric:         Mood and Affect: Mood normal.         Behavior: Behavior normal.         Laboratory Data:  Lab Results   Component Value Date    WBC 5.3 2024    HGB 10.6 (L) 2024    HCT 33.0 (L) 2024    MCV 85 " 01/05/2024     01/05/2024    ANC 2.87 08/04/2023       Chemistry    Lab Results   Component Value Date/Time     01/05/2024 1110    K 3.9 01/05/2024 1110     01/05/2024 1110    CO2 27 01/05/2024 1110    BUN 12 01/05/2024 1110    CREATININE 0.68 01/05/2024 1110    Lab Results   Component Value Date/Time    CALCIUM 9.6 01/05/2024 1110    ALKPHOS 92 01/05/2024 1110    AST 10 01/05/2024 1110    ALT 9 01/05/2024 1110    BILITOT 0.4 01/05/2024 1110        Radiology: N/A    Pathology: N/A      Assessment/Plan:  Sharonda Yeboah is a 53 y.o. female with a history of left-sided triple-negative breast cancer, who presents today for follow-up evaluation on observation.     1. Left breast cancer. Stage IIIC (cT2-3 cN3b cM0), grade 3 IDC, triple-negative.  CT CAP and bone scan did not show any evidence of distant metastatic disease. She has been initiation on the KEYNOTE-522 regimen (paclitaxel, carboplatin, doxorubicin, cyclophosphamide, pembrolizumab), administered 11/4/22-4/7/23. Given ongoing abdominal symptoms and diarrhea, completed adj pembrolizumab after 3 doses on July 21, 2023. S/p adjuvant RT, completed 8/2023. Currently on observation.  - Ongoing left breast lymphedema noted. Encouraged her to contact PT. She will obtain a fitted support bra- referral placed.  - Mammogram unremarkable 10/2023. Continue yearly screening.  - IR referral for port removal.     2. Hidradenitis suppurativa. Diagnosed in late-teens. S/p multiple lines of therapy, including axillary surgery. No new symptoms on checkpoint  inhibitor therapy.  - Following with Dr. Jamia Carter from Dermatology.     3. H/o recurrent SBO. S/p multiple hernia repair surgeries and prior bowel resection for SBO.  - Last admitted 12/2023, managed conservatively. Currently no symptoms.     4. LLE DVT. Provoked in the setting of chemotherapy and prior hospitalization.  US done on 7/7/23 showed stable chronic DVT.  - FU Dopplers after 3 months of  anticoagulation showed chronic thrombus.  - Obtain updated LLE Doppler, D-dimer.  - Will also obtain CTA chest, per patient request, to r/o PE (chest discomfort).    Disposition.  - RTC 3-4 months for FUV.  - Labs to be done tomorrow.  - She has our contact information and was instructed to call with concerns/questions in the interim.    Orders Placed This Encounter   Procedures    CT angio chest w and wo IV contrast    IR CVC removal    CBC and Auto Differential    Comprehensive Metabolic Panel    D-dimer, Non VTE    APTT    Protime-INR    Anti-Cardiolipin Antibody (IgA, IgG, and IgM)    Beta-2 Glycoprotein Antibodies      Abebe Hamlin MD  Hematology and Medical Oncology  [facility]

## 2024-01-11 ENCOUNTER — HOSPITAL ENCOUNTER (OUTPATIENT)
Dept: RADIOLOGY | Facility: HOSPITAL | Age: 54
Discharge: HOME | End: 2024-01-11
Payer: COMMERCIAL

## 2024-01-11 VITALS
OXYGEN SATURATION: 98 % | TEMPERATURE: 98.1 F | RESPIRATION RATE: 16 BRPM | SYSTOLIC BLOOD PRESSURE: 125 MMHG | HEART RATE: 74 BPM | DIASTOLIC BLOOD PRESSURE: 82 MMHG

## 2024-01-11 DIAGNOSIS — C50.912 BREAST CANCER METASTASIZED TO AXILLARY LYMPH NODE, LEFT (MULTI): ICD-10-CM

## 2024-01-11 DIAGNOSIS — C77.3 BREAST CANCER METASTASIZED TO AXILLARY LYMPH NODE, LEFT (MULTI): ICD-10-CM

## 2024-01-11 PROCEDURE — 2500000004 HC RX 250 GENERAL PHARMACY W/ HCPCS (ALT 636 FOR OP/ED): Performed by: RADIOLOGY

## 2024-01-11 PROCEDURE — 36589 REMOVAL TUNNELED CV CATH: CPT | Performed by: RADIOLOGY

## 2024-01-11 PROCEDURE — 99153 MOD SED SAME PHYS/QHP EA: CPT | Performed by: RADIOLOGY

## 2024-01-11 PROCEDURE — 36590 REMOVAL TUNNELED CV CATH: CPT | Performed by: RADIOLOGY

## 2024-01-11 PROCEDURE — 99152 MOD SED SAME PHYS/QHP 5/>YRS: CPT | Performed by: RADIOLOGY

## 2024-01-11 PROCEDURE — 99152 MOD SED SAME PHYS/QHP 5/>YRS: CPT

## 2024-01-11 RX ORDER — FENTANYL CITRATE 50 UG/ML
INJECTION, SOLUTION INTRAMUSCULAR; INTRAVENOUS
Status: COMPLETED | OUTPATIENT
Start: 2024-01-11 | End: 2024-01-11

## 2024-01-11 RX ORDER — MIDAZOLAM HYDROCHLORIDE 1 MG/ML
INJECTION INTRAMUSCULAR; INTRAVENOUS
Status: COMPLETED | OUTPATIENT
Start: 2024-01-11 | End: 2024-01-11

## 2024-01-11 RX ADMIN — MIDAZOLAM HYDROCHLORIDE 1 MG: 1 INJECTION, SOLUTION INTRAMUSCULAR; INTRAVENOUS at 11:05

## 2024-01-11 RX ADMIN — MIDAZOLAM HYDROCHLORIDE 1 MG: 1 INJECTION, SOLUTION INTRAMUSCULAR; INTRAVENOUS at 10:57

## 2024-01-11 RX ADMIN — FENTANYL CITRATE 50 MCG: 50 INJECTION, SOLUTION INTRAMUSCULAR; INTRAVENOUS at 10:57

## 2024-01-11 RX ADMIN — FENTANYL CITRATE 50 MCG: 50 INJECTION, SOLUTION INTRAMUSCULAR; INTRAVENOUS at 11:05

## 2024-01-11 ASSESSMENT — PAIN - FUNCTIONAL ASSESSMENT
PAIN_FUNCTIONAL_ASSESSMENT: 0-10

## 2024-01-11 ASSESSMENT — ENCOUNTER SYMPTOMS
FEVER: 0
ABDOMINAL DISTENTION: 0
SHORTNESS OF BREATH: 0
ABDOMINAL PAIN: 0
HEADACHES: 0
DIZZINESS: 0
CHILLS: 0

## 2024-01-11 ASSESSMENT — PAIN SCALES - GENERAL
PAINLEVEL_OUTOF10: 0 - NO PAIN

## 2024-01-11 NOTE — Clinical Note
R chest mediport removed. Patient received 2mg versed, 100mcg fentanyl IVP for sedation during case. R chest site sutured and gauze and tegaderm placed on R chest site. No visible bleeding or hematoma. VSS throughout procedure.

## 2024-01-11 NOTE — DISCHARGE INSTRUCTIONS
You received moderate sedation:  - Do not drive a car, or operate any machinery or power tools of any kind.  - Do not drink any alcoholic drinks.  - Do not take any over the counter medications that may cause drowsiness.  - Do not make any important decisions or sign any legal documents.  - You need to have a responsible adult accompany you home.  - You may resume your normal diet.  - We strongly suggest that a responsible adult be with you for the rest of the day and also during the night. This is for your protection and safety.   Keep dressing on for 48 hours and then remove. Keep steri stripes on until they fall off on there own. Signs of infection: pus from site, fever, flu like symtoms. Call the number below if these symtoms emerge after having your mediport removed.     For questions related to your procedure:  Please call [phone] between the hours of 7:00am-5:00pm Monday through Friday.  Please call [phone] after 5:00pm and on weekends and holidays.     In the event of an emergency call 911 or go to your nearest emergency room.

## 2024-01-11 NOTE — POST-PROCEDURE NOTE
Interventional Radiology Brief Postprocedure Note    Attending: Dr. Alexy Winn MD    Assistant: Dr. Levi Barriga DO, PGY-2    Diagnosis: Breast cancer, port removal    Description of procedure: Successful IR removal of right chest Mediport.     Anesthesia:  Local/see below    Complications: None    Estimated Blood Loss: minimal    Medications (Filter: Administrations occurring from 1040 to 1127 on 01/11/24) As of 01/11/24 1127      fentaNYL PF (Sublimaze) injection (mcg) Total dose:  100 mcg      Date/Time Rate/Dose/Volume Action       01/11/24  1057 50 mcg Given      1105 50 mcg Given               midazolam (Versed) injection (mg) Total dose:  2 mg      Date/Time Rate/Dose/Volume Action       01/11/24  1057 1 mg Given      1105 1 mg Given                   No specimens collected      See detailed result report with images in PACS.    The patient tolerated the procedure well without incident or complication and is in stable condition.

## 2024-01-11 NOTE — PRE-PROCEDURE NOTE
Interventional Radiology Preprocedure Note    Indication for procedure: The encounter diagnosis was Breast cancer metastasized to axillary lymph node, left (CMS/HCC).    Relevant review of systems: Review of Systems   Constitutional:  Negative for chills and fever.   Respiratory:  Negative for shortness of breath.    Cardiovascular:  Negative for chest pain.   Gastrointestinal:  Negative for abdominal distention and abdominal pain.   Skin: Negative.    Neurological:  Negative for dizziness and headaches.        Relevant Labs:   Lab Results   Component Value Date    CREATININE 0.68 01/05/2024    EGFR >90 01/05/2024    INR 1.2 (H) 01/05/2024    PROTIME 13.7 (H) 01/05/2024       Planned Sedation/Anesthesia: Minimal    Airway assessment: normal    Directed physical examination:    Physical Exam  Constitutional:       Appearance: Normal appearance. She is normal weight.   Cardiovascular:      Rate and Rhythm: Normal rate.      Pulses: Normal pulses.   Pulmonary:      Effort: Pulmonary effort is normal.   Skin:     General: Skin is warm and dry.   Neurological:      Mental Status: She is oriented to person, place, and time. Mental status is at baseline.          Mallampati: III (soft and hard palate and base of uvula visible)    ASA Score: ASA 2 - Patient with mild systemic disease with no functional limitations    Benefits, risks and alternatives of procedure and planned sedation have been discussed with the patient and/or their representative. All questions answered and they agree to proceed.

## 2024-01-19 ENCOUNTER — HOSPITAL ENCOUNTER (OUTPATIENT)
Dept: RADIOLOGY | Facility: HOSPITAL | Age: 54
End: 2024-01-19
Payer: COMMERCIAL

## 2024-01-30 ENCOUNTER — TELEPHONE (OUTPATIENT)
Dept: HEMATOLOGY/ONCOLOGY | Facility: CLINIC | Age: 54
End: 2024-01-30

## 2024-01-30 ENCOUNTER — OFFICE VISIT (OUTPATIENT)
Dept: DERMATOLOGY | Facility: CLINIC | Age: 54
End: 2024-01-30
Payer: COMMERCIAL

## 2024-01-30 DIAGNOSIS — C77.3 BREAST CANCER METASTASIZED TO AXILLARY LYMPH NODE, LEFT (MULTI): Primary | ICD-10-CM

## 2024-01-30 DIAGNOSIS — C50.912 BREAST CANCER METASTASIZED TO AXILLARY LYMPH NODE, LEFT (MULTI): Primary | ICD-10-CM

## 2024-01-30 DIAGNOSIS — L73.2 HIDRADENITIS SUPPURATIVA: Primary | ICD-10-CM

## 2024-01-30 PROCEDURE — 3008F BODY MASS INDEX DOCD: CPT | Performed by: DERMATOLOGY

## 2024-01-30 PROCEDURE — 11900 INJECT SKIN LESIONS </W 7: CPT | Performed by: DERMATOLOGY

## 2024-01-30 PROCEDURE — 99214 OFFICE O/P EST MOD 30 MIN: CPT | Performed by: DERMATOLOGY

## 2024-01-30 PROCEDURE — 1036F TOBACCO NON-USER: CPT | Performed by: DERMATOLOGY

## 2024-01-30 RX ORDER — DOXYCYCLINE HYCLATE 100 MG
100 TABLET ORAL 2 TIMES DAILY
Qty: 180 TABLET | Refills: 1 | Status: SHIPPED | OUTPATIENT
Start: 2024-01-30 | End: 2024-04-04 | Stop reason: WASHOUT

## 2024-01-30 RX ORDER — BENZOYL PEROXIDE 100 MG/ML
LIQUID TOPICAL DAILY
Qty: 227 G | Refills: 11 | Status: SHIPPED | OUTPATIENT
Start: 2024-01-30

## 2024-01-30 RX ORDER — TRIAMCINOLONE ACETONIDE 40 MG/ML
40 INJECTION, SUSPENSION INTRA-ARTICULAR; INTRAMUSCULAR ONCE
Status: COMPLETED | OUTPATIENT
Start: 2024-01-30 | End: 2024-01-30

## 2024-01-30 RX ORDER — TRAMADOL HYDROCHLORIDE 50 MG/1
50 TABLET ORAL EVERY 6 HOURS PRN
Qty: 60 TABLET | Refills: 0 | Status: SHIPPED | OUTPATIENT
Start: 2024-01-30 | End: 2024-02-14

## 2024-01-30 RX ADMIN — TRIAMCINOLONE ACETONIDE 40 MG: 40 INJECTION, SUSPENSION INTRA-ARTICULAR; INTRAMUSCULAR at 12:03

## 2024-01-30 ASSESSMENT — DERMATOLOGY PATIENT ASSESSMENT
HAVE YOU HAD OR DO YOU HAVE VASCULAR DISEASE: NO
DO YOU HAVE ANY NEW OR CHANGING LESIONS: YES
ARE YOU AN ORGAN TRANSPLANT RECIPIENT: NO
ARE YOU ON BIRTH CONTROL: NO
ARE YOU TRYING TO GET PREGNANT: NO
DO YOU USE A TANNING BED: NO
HAVE YOU HAD OR DO YOU HAVE A STAPH INFECTION: NO
DO YOU HAVE IRREGULAR MENSTRUAL CYCLES: NO

## 2024-01-30 ASSESSMENT — DERMATOLOGY QUALITY OF LIFE (QOL) ASSESSMENT
RATE HOW BOTHERED YOU ARE BY SYMPTOMS OF YOUR SKIN PROBLEM (EG, ITCHING, STINGING BURNING, HURTING OR SKIN IRRITATION): 0 - NEVER BOTHERED
DATE THE QUALITY-OF-LIFE ASSESSMENT WAS COMPLETED: 66869
ARE THERE EXCLUSIONS OR EXCEPTIONS FOR THE QUALITY OF LIFE ASSESSMENT: NO
RATE HOW BOTHERED YOU ARE BY EFFECTS OF YOUR SKIN PROBLEMS ON YOUR ACTIVITIES (EG, GOING OUT, ACCOMPLISHING WHAT YOU WANT, WORK ACTIVITIES OR YOUR RELATIONSHIPS WITH OTHERS): 0 - NEVER BOTHERED
RATE HOW EMOTIONALLY BOTHERED YOU ARE BY YOUR SKIN PROBLEM (FOR EXAMPLE, WORRY, EMBARRASSMENT, FRUSTRATION): 0 - NEVER BOTHERED

## 2024-01-30 ASSESSMENT — PATIENT GLOBAL ASSESSMENT (PGA): PATIENT GLOBAL ASSESSMENT: PATIENT GLOBAL ASSESSMENT:  1 - CLEAR

## 2024-01-30 ASSESSMENT — ITCH NUMERIC RATING SCALE: HOW SEVERE IS YOUR ITCHING?: 0

## 2024-01-30 NOTE — PROGRESS NOTES
"Subjective     Sharonda Yeboah is a 53 y.o. female who presents for the following: Hidradenitis Suppurativa.     Last derm visit 4/2023, at that time Zamarripa stage 3, still receiving immunotherapy for breast cancer, plan for surgery. Tx rifampin/clindamycin, could not tolerate the entire treatment course. Re-started minocycline, stopped working, switched to doxycycline 10/2023.     For her breast cancer - Breast cancer Dx 2022, stage 3 locally advanced triple negative. Neoadjuvantimmunochemotherapy with pembrolizumab. Last mammo, no evidence of disease, screening annually.     Review of Systems:  No other skin or systemic complaints other than what is documented elsewhere in the note.    The following portions of the chart were reviewed this encounter and updated as appropriate:          Skin Cancer History  No skin cancer on file.      Specialty Problems          Dermatology Problems    Hidradenitis suppurativa     Hidradenitis - Many years. Surgery R axilla. Doxy/Fabio \"keeps it at bay.\" Humira c/b psoriasiswithin 1 month, stopped.Crohn's disease - diagnosed/treated at [facility] but quiescent for years per patient report              Objective   Well appearing patient in no apparent distress; mood and affect are within normal limits.    A focused skin examination was performed. All findings within normal limits unless otherwise noted below.    Assessment/Plan   1. Hidradenitis suppurativa  Gluteal Crease, Left Axilla, Left Medial Thigh, Right Axilla, Right Medial Thigh  Deep sinus tract formation and numerous scars in these locations, zamarripa stage 3    -Discussed nature of condition  -Discussed treatment options  - For now proceed with ILK  - we discussed retrial of humira but she is scared of psoriasis. Also discussed isotretinoin. Not a good candidate for spironolactone given breast cancer  - cont doxycycline for now, but she may have to stop if contributing to bowel obstructions    Intralesional injection - Gluteal " Crease, Left Axilla, Left Medial Thigh, Right Axilla, Right Medial Thigh  Intralesional Injection:   Consent:     Consent obtained:  Verbal    Consent given by:  Patient    Risks discussed:  Poor cosmetic result, pain, infection and bleeding    Alternatives discussed:  No treatment  Pre-Procedure Details:     Prep Type:  Isopropyl alcohol  Procedure Details:   Injection:  Triamcinolone  Outcome: patient tolerated procedure well  Post-procedure details: sterile dressing applied and wound care instructions given  Dressing type: bandage   Comments:  A total of 1 ml of 40 mg/mL Kenalog were injected into 5 lesion(s)    Related Procedures  Follow Up In Dermatology - Established Patient    Related Medications  triamcinolone acetonide (Kenalog-40) injection 40 mg      benzoyl peroxide (Benzac AC) 10 % external wash  Apply topically once daily. As directed leave on for 5 min then rinse    doxycycline (Vibra-Tabs) 100 mg tablet  Take 1 tablet (100 mg) by mouth 2 times a day. Take with food and a full glass of water and do not lie down for at least 30 minutes after.      Follow up 6-8 weeks for ILK   Also interested in teaching sessions with Dr. Jones, will send message

## 2024-01-30 NOTE — TELEPHONE ENCOUNTER
Called Sharonda back.  Dopplers negative for any residual clot.  D-dimer negative.  As this event was provoked, she may stop anticoagulation.  Will repeat D-dimer at next visit in 3 months.  She is still having breast pain/swelling- she is making a follow-up appointment with Dr. Reyes as the PT is not helping much.

## 2024-02-02 ENCOUNTER — APPOINTMENT (OUTPATIENT)
Dept: PHYSICAL THERAPY | Facility: CLINIC | Age: 54
End: 2024-02-02
Payer: COMMERCIAL

## 2024-02-06 ENCOUNTER — HOSPITAL ENCOUNTER (OUTPATIENT)
Dept: RADIOLOGY | Facility: HOSPITAL | Age: 54
Discharge: HOME | End: 2024-02-06
Payer: COMMERCIAL

## 2024-02-06 DIAGNOSIS — I82.4Z2 ACUTE DEEP VEIN THROMBOSIS (DVT) OF DISTAL VEIN OF LEFT LOWER EXTREMITY (MULTI): ICD-10-CM

## 2024-02-06 DIAGNOSIS — R06.02 SHORTNESS OF BREATH: ICD-10-CM

## 2024-02-06 PROCEDURE — 71275 CT ANGIOGRAPHY CHEST: CPT | Performed by: RADIOLOGY

## 2024-02-06 PROCEDURE — 2550000001 HC RX 255 CONTRASTS: Performed by: INTERNAL MEDICINE

## 2024-02-06 PROCEDURE — 71275 CT ANGIOGRAPHY CHEST: CPT

## 2024-02-06 RX ADMIN — IOHEXOL 61 ML: 350 INJECTION, SOLUTION INTRAVENOUS at 13:29

## 2024-02-09 ENCOUNTER — DOCUMENTATION (OUTPATIENT)
Dept: PHYSICAL THERAPY | Facility: CLINIC | Age: 54
End: 2024-02-09
Payer: COMMERCIAL

## 2024-02-09 DIAGNOSIS — I97.2 LYMPHEDEMA SYNDROME, POSTMASTECTOMY: Primary | ICD-10-CM

## 2024-02-09 NOTE — PROGRESS NOTES
Cancel/no whoPhysical Therapy                 Therapy Communication Note    Patient Name: Sharonda Yeboah  MRN: [id number]  Today's Date: 2/9/2024     Discipline: Physical Therapy    Missed Visit Reason:      Missed Time: No Show    Comment: phoned and spoke with patient.  Pt out of town and did not realized she had appointment scheduled

## 2024-02-15 ENCOUNTER — APPOINTMENT (OUTPATIENT)
Dept: PHYSICAL THERAPY | Facility: CLINIC | Age: 54
End: 2024-02-15
Payer: COMMERCIAL

## 2024-02-15 ENCOUNTER — DOCUMENTATION (OUTPATIENT)
Dept: PHYSICAL THERAPY | Facility: CLINIC | Age: 54
End: 2024-02-15
Payer: COMMERCIAL

## 2024-02-15 NOTE — PROGRESS NOTES
Physical Therapy                 Therapy Communication Note    Patient Name: Sharonda Yeboah  MRN: [id number]  Today's Date: 2/15/2024     Discipline: Physical Therapy    Missed Visit Reason:      Missed Time: Cancel    Comment:

## 2024-02-21 ENCOUNTER — TREATMENT (OUTPATIENT)
Dept: PHYSICAL THERAPY | Facility: CLINIC | Age: 54
End: 2024-02-21
Payer: COMMERCIAL

## 2024-02-21 DIAGNOSIS — I97.2 LYMPHEDEMA SYNDROME, POSTMASTECTOMY: Primary | ICD-10-CM

## 2024-02-21 DIAGNOSIS — I97.2 POSTMASTECTOMY LYMPHEDEMA SYNDROME: ICD-10-CM

## 2024-02-21 PROCEDURE — 97164 PT RE-EVAL EST PLAN CARE: CPT | Mod: GP

## 2024-02-21 PROCEDURE — 97140 MANUAL THERAPY 1/> REGIONS: CPT | Mod: GP

## 2024-02-21 ASSESSMENT — PAIN SCALES - GENERAL: PAINLEVEL_OUTOF10: 0 - NO PAIN

## 2024-02-21 ASSESSMENT — PAIN - FUNCTIONAL ASSESSMENT: PAIN_FUNCTIONAL_ASSESSMENT: 0-10

## 2024-02-21 NOTE — PROGRESS NOTES
Physical Therapy    Physical Therapy Treatment    Patient Name: Sharonda Yeboah  MRN: [id number]  Today's Date: 2/21/2024  Time Calculation  Start Time: 1100  Stop Time: 1200  Time Calculation (min): 60 min  Visit 4/12    [employer] 10 visits 2/1 thru 4/30 , visit 1/10    Assessment:  PT Assessment  Assessment Comment: Re-evaluation done today as pt has been to therapy in 8 weeks.  Reassessment shows no significant change in left arm girth.  Left breast still very edematous but improvements noted in skin condition; cysts have decreased but now has peau d'orange skin appearance.  Pt would benefit from continues PT for complete decongestive therapy to reduce edema and improve skin condition  Plan:   Cont per POC with MLD and compression therapy to reduce edema in left breast.     Current Problem  1. Lymphedema syndrome, postmastectomy        2. Postmastectomy lymphedema syndrome  Follow Up In Physical Therapy        Subjective    Pt has  not been to therapy since 12/26/2022.  Pt states that she has a lot going on.  States that she needs surgeries for brain aneurysm  as well as hernia repair and TKA.  Reports that left breast is still swollen but pain is less frequent and breast does not feel as heavy.      Precautions  Precautions Comment: lymphedema    Pain Assessment  Pain Assessment: 0-10  Pain Score: 0 - No pain    Objective   UE girth:      Right        Left    Hand       19.0cm     18.7cm    Wrist       16.5cm     15.2cm    8cm         20.2cm     18.4cm     16cm       25.8cm     25.1cm    24cm       28.8cm     26.3cm    32cm       33.8cm     31.8cm    40cm       37.2cm     33.8cm  Peau d'orange skin texture left breast. No fibrosis noted.   Scar near axilla tender to palpation, raised with  mod adhesions    Treatments:  Manual therapy (45min)  --manual lymph drainage secondary left UE protocol with emphasis on truncal decongestion, parasternal lymph node stimulation. No abdominal sequence due to hernia; pt  performed diaphragmatic breathing.  --scar massage to scar on left lateral breast    OP EDUCATION:  Outpatient Education  Individual(s) Educated: Patient  Education Provided: Lymphedema care    Goals:  Active       PT Problem       PT Goal 1 (Progressing)       Start:  11/03/23    Expected End:  04/21/24       Pt independent with lymphedema risk-reduction strategies         PT Goal 2 (Progressing)       Start:  11/03/23    Expected End:  04/21/24       Improve LLIS to no more than 15% impairment         PT Goal 3 (Progressing)       Start:  11/03/23    Expected End:  04/21/24       Pt independent with lymphedema self-care/self-management strategies         PT Goal 4 (Progressing)       Start:  11/03/23    Expected End:  04/21/24       Reduce frequency and intensity of pain in left breast by at least 60%

## 2024-02-23 ENCOUNTER — TREATMENT (OUTPATIENT)
Dept: PHYSICAL THERAPY | Facility: CLINIC | Age: 54
End: 2024-02-23
Payer: COMMERCIAL

## 2024-02-23 DIAGNOSIS — I97.2 POSTMASTECTOMY LYMPHEDEMA SYNDROME: ICD-10-CM

## 2024-02-23 DIAGNOSIS — I97.2 LYMPHEDEMA SYNDROME, POSTMASTECTOMY: Primary | ICD-10-CM

## 2024-02-23 PROCEDURE — 97140 MANUAL THERAPY 1/> REGIONS: CPT | Mod: GP

## 2024-02-23 ASSESSMENT — PAIN - FUNCTIONAL ASSESSMENT: PAIN_FUNCTIONAL_ASSESSMENT: 0-10

## 2024-02-23 ASSESSMENT — PAIN SCALES - GENERAL: PAINLEVEL_OUTOF10: 0 - NO PAIN

## 2024-02-23 NOTE — PROGRESS NOTES
"Physical Therapy Treatment    Patient Name: Sharonda Yeboah  MRN: [id number]  Today's Date: 2/23/2024  Time Calculation  Start Time: 1130  Stop Time: 1215  Time Calculation (min): 45 min  PT Therapeutic Procedures Time Entry  Manual Therapy Time Entry: 45  Visit 5/12    Insurance:  Visit number: 2 of 10  Authorization info: 10 visits thru 4/30  Insurance Type: [facility] / Plan: [facility] [employer] [place] / Product Type: *No Product type* /     Current Problem   1. Lymphedema syndrome, postmastectomy        2. Postmastectomy lymphedema syndrome  Follow Up In Physical Therapy          Subjective   Pt reports right breast feels \"less heavy\".    Precautions  Precautions Comment: lymphedema    Pain Assessment: 0-10  Pain Score: 0 - No pain  Post Treatment Pain Level 0    Objective   Significant edema left breast and lateral trunk    Treatments:     Manual therapy (45min)  --manual lymph drainage secondary left UE protocol with emphasis on truncal decongestion, parasternal lymph node stimulation. No abdominal sequence due to hernia; pt performed diaphragmatic breathing.  --scar massage to scar on left lateral breast    Assessment   PT Assessment  Assessment Comment: Pt responding appropriately to MLD with reports of decreased heaviness and improved skin condition.    Plan:    Cont per POC    OP EDUCATION:  Outpatient Education  Individual(s) Educated: Patient  Education Provided: Lymphedema care    Goals:   Active       PT Problem       PT Goal 1 (Progressing)       Start:  11/03/23    Expected End:  04/21/24       Pt independent with lymphedema risk-reduction strategies         PT Goal 2 (Progressing)       Start:  11/03/23    Expected End:  04/21/24       Improve LLIS to no more than 15% impairment         PT Goal 3 (Progressing)       Start:  11/03/23    Expected End:  04/21/24       Pt independent with lymphedema self-care/self-management strategies         PT Goal 4 (Progressing)       Start:  " 11/03/23    Expected End:  04/21/24       Reduce frequency and intensity of pain in left breast by at least 60%

## 2024-02-26 ENCOUNTER — TREATMENT (OUTPATIENT)
Dept: PHYSICAL THERAPY | Facility: CLINIC | Age: 54
End: 2024-02-26
Payer: COMMERCIAL

## 2024-02-26 DIAGNOSIS — I97.2 POSTMASTECTOMY LYMPHEDEMA SYNDROME: ICD-10-CM

## 2024-02-26 DIAGNOSIS — I97.2 LYMPHEDEMA SYNDROME, POSTMASTECTOMY: Primary | ICD-10-CM

## 2024-02-26 PROCEDURE — 97140 MANUAL THERAPY 1/> REGIONS: CPT | Mod: GP

## 2024-02-26 ASSESSMENT — PAIN SCALES - GENERAL: PAINLEVEL_OUTOF10: 2

## 2024-02-26 ASSESSMENT — PAIN DESCRIPTION - DESCRIPTORS: DESCRIPTORS: SORE

## 2024-02-26 ASSESSMENT — PAIN - FUNCTIONAL ASSESSMENT: PAIN_FUNCTIONAL_ASSESSMENT: 0-10

## 2024-02-26 NOTE — PROGRESS NOTES
Physical Therapy Treatment    Patient Name: Sharonda Yeboah  MRN: [id number]  Today's Date: 2/26/2024  Time Calculation  Start Time: 1100  Stop Time: 1150  Time Calculation (min): 50 min  PT Therapeutic Procedures Time Entry  Manual Therapy Time Entry: 45  Visit 6/12    Insurance:  Visit number: 3 of 10  Authorization info: 10 visits thru 4/30  Insurance Type: Payor: [employer] [facility] / Plan: [employer] [place] / Product Type: *No Product type* /     Current Problem   1. Lymphedema syndrome, postmastectomy        2. Postmastectomy lymphedema syndrome  Follow Up In Physical Therapy          Subjective   Pt reports left breast feels lighter but is now having mild soreness on underside of breast.    Precautions  Precautions Comment: lymphedema    Pain Assessment: 0-10  Pain Score: 2  Pain Location: Breast  Pain Orientation: Left  Pain Descriptors: Sore  Post Treatment Pain Level 2    Objective   Left breast soft, non-pitting, mild fibrosis on underside    Treatments:    Manual:   --manual lymph drainage secondary left UE protocol with emphasis on truncal decongestion, parasternal lymph node stimulation. No abdominal sequence due to hernia; pt performed diaphragmatic breathing.  --scar massage to scar on left lateral breast    Assessment   PT Assessment  Assessment Comment: Pt continues to respond well to MLD.  Less heavines reported and improved skin appearance noted.  Recommend mod compression  bra to further decongest left breast.    Plan:    Cont per POC    OP EDUCATION:  Outpatient Education  Individual(s) Educated: Patient  Education Provided: Lymphedema care    Goals:   Active       PT Problem       PT Goal 1 (Progressing)       Start:  11/03/23    Expected End:  04/21/24       Pt independent with lymphedema risk-reduction strategies         PT Goal 2 (Progressing)       Start:  11/03/23    Expected End:  04/21/24       Improve LLIS to no more than 15% impairment         PT Goal 3 (Progressing)        Start:  11/03/23    Expected End:  04/21/24       Pt independent with lymphedema self-care/self-management strategies         PT Goal 4 (Progressing)       Start:  11/03/23    Expected End:  04/21/24       Reduce frequency and intensity of pain in left breast by at least 60%

## 2024-02-28 ENCOUNTER — TREATMENT (OUTPATIENT)
Dept: PHYSICAL THERAPY | Facility: CLINIC | Age: 54
End: 2024-02-28
Payer: COMMERCIAL

## 2024-02-28 DIAGNOSIS — I97.2 LYMPHEDEMA SYNDROME, POSTMASTECTOMY: Primary | ICD-10-CM

## 2024-02-28 DIAGNOSIS — I97.2 POSTMASTECTOMY LYMPHEDEMA SYNDROME: ICD-10-CM

## 2024-02-28 PROCEDURE — 97140 MANUAL THERAPY 1/> REGIONS: CPT | Mod: GP

## 2024-02-28 ASSESSMENT — PAIN SCALES - GENERAL: PAINLEVEL_OUTOF10: 0 - NO PAIN

## 2024-02-28 ASSESSMENT — PAIN - FUNCTIONAL ASSESSMENT: PAIN_FUNCTIONAL_ASSESSMENT: 0-10

## 2024-02-28 NOTE — PROGRESS NOTES
Physical Therapy Treatment    Patient Name: Sharonda Yeboah  MRN: [id number]  Today's Date: 2/28/2024  Time Calculation  Start Time: 1100  Stop Time: 1140  Time Calculation (min): 40 min  PT Therapeutic Procedures Time Entry  Manual Therapy Time Entry: 40  Visit 7/12    Insurance:  Visit number: 4 of 10  Authorization info: 10 visits thru 2/1-4/30  Insurance Type: [facility]: [facility] / Plan: [facility] [place] / Product Type: *No Product type* /     Current Problem   1. Lymphedema syndrome, postmastectomy        2. Postmastectomy lymphedema syndrome  Follow Up In Physical Therapy          Subjective   Pt reports that sharp, shooting pains in left breast are less frequent and left breast continues to feel lighter.    Precautions  Precautions Comment: lymphedema    Pain Assessment: 0-10  Pain Score: 0 - No pain  Post Treatment Pain Level 0    Objective   Scar on left lateral breast flat but with mod adhesions.    Treatments:  Manual:    --manual lymph drainage secondary left UE protocol with emphasis on truncal decongestion, parasternal lymph node stimulation. No abdominal sequence due to hernia; pt performed diaphragmatic breathing.  --scar massage to scar on left lateral breast      Assessment   Assessment:   PT Assessment  Assessment Comment: Decreasing pain noted with MLD. Pt tolerates well and responds appropriately.  Will conitnue with MLD to further decongest left breast,.    Plan:    Cont per POC    OP EDUCATION:  Outpatient Education  Individual(s) Educated: Patient  Education Provided: Lymphedema care    Goals:   Active       PT Problem       PT Goal 1 (Progressing)       Start:  11/03/23    Expected End:  04/21/24       Pt independent with lymphedema risk-reduction strategies         PT Goal 2 (Progressing)       Start:  11/03/23    Expected End:  04/21/24       Improve LLIS to no more than 15% impairment         PT Goal 3 (Progressing)       Start:  11/03/23    Expected End:  04/21/24        Pt independent with lymphedema self-care/self-management strategies         PT Goal 4 (Progressing)       Start:  11/03/23    Expected End:  04/21/24       Reduce frequency and intensity of pain in left breast by at least 60%

## 2024-03-04 ENCOUNTER — TREATMENT (OUTPATIENT)
Dept: PHYSICAL THERAPY | Facility: CLINIC | Age: 54
End: 2024-03-04
Payer: COMMERCIAL

## 2024-03-04 DIAGNOSIS — I97.2 POSTMASTECTOMY LYMPHEDEMA SYNDROME: ICD-10-CM

## 2024-03-04 DIAGNOSIS — I97.2 LYMPHEDEMA SYNDROME, POSTMASTECTOMY: Primary | ICD-10-CM

## 2024-03-04 PROCEDURE — 97140 MANUAL THERAPY 1/> REGIONS: CPT | Mod: GP

## 2024-03-04 ASSESSMENT — PAIN - FUNCTIONAL ASSESSMENT: PAIN_FUNCTIONAL_ASSESSMENT: 0-10

## 2024-03-04 ASSESSMENT — PAIN SCALES - GENERAL: PAINLEVEL_OUTOF10: 0 - NO PAIN

## 2024-03-04 NOTE — PROGRESS NOTES
"Physical Therapy Treatment    Patient Name: Sharonda Yeboah  MRN: [id number]  Today's Date: 3/4/2024  Time Calculation  Start Time: 1100  Stop Time: 1150  Time Calculation (min): 50 min  PT Therapeutic Procedures Time Entry  Manual Therapy Time Entry: 45  Visit 8/12    Insurance:  Visit number: 5 of 10  Authorization info: 10 visits thru 2/1-4/30  Insurance Type: Payor: [employer] [facility] / Plan: [employer] [place] / Product Type: *No Product type* /     Current Problem   1. Lymphedema syndrome, postmastectomy        2. Postmastectomy lymphedema syndrome  Follow Up In Physical Therapy        Subjective   Pt states she had some pain in left breast yesterday but is OK today.  Overall, pain has decreased and is less frequent    Precautions  Precautions Comment: lymphedema    Pain Assessment: 0-10  Pain Score: 0 - No pain  Post Treatment Pain Level 0    Objective   Peau d'orange skin left breast    Treatments:  Manual:     --manual lymph drainage secondary left UE protocol with emphasis on truncal decongestion, parasternal lymph node stimulation. No abdominal sequence due to hernia; pt performed diaphragmatic breathing.  --scar massage to scar on left lateral breast    Assessment   PT Assessment  Assessment Comment: Pt continues to progress with MLD, noting decreased left breast  \"heaviness\" and pain.  Pt advised to get fitted for compression bra for maximum benefit    Plan:    Cont per POC    OP EDUCATION:  Outpatient Education  Individual(s) Educated: Patient  Education Provided: Lymphedema care    Goals:   Active       PT Problem       PT Goal 1 (Progressing)       Start:  11/03/23    Expected End:  04/21/24       Pt independent with lymphedema risk-reduction strategies         PT Goal 2 (Progressing)       Start:  11/03/23    Expected End:  04/21/24       Improve LLIS to no more than 15% impairment         PT Goal 3 (Progressing)       Start:  11/03/23    Expected End:  04/21/24       Pt independent with " lymphedema self-care/self-management strategies         PT Goal 4 (Progressing)       Start:  11/03/23    Expected End:  04/21/24       Reduce frequency and intensity of pain in left breast by at least 60%

## 2024-03-06 ENCOUNTER — TREATMENT (OUTPATIENT)
Dept: PHYSICAL THERAPY | Facility: CLINIC | Age: 54
End: 2024-03-06
Payer: COMMERCIAL

## 2024-03-06 DIAGNOSIS — I97.2 POSTMASTECTOMY LYMPHEDEMA SYNDROME: ICD-10-CM

## 2024-03-06 DIAGNOSIS — I97.2 LYMPHEDEMA SYNDROME, POSTMASTECTOMY: Primary | ICD-10-CM

## 2024-03-06 PROCEDURE — 97140 MANUAL THERAPY 1/> REGIONS: CPT | Mod: GP

## 2024-03-06 ASSESSMENT — PAIN SCALES - GENERAL: PAINLEVEL_OUTOF10: 0 - NO PAIN

## 2024-03-06 ASSESSMENT — PAIN - FUNCTIONAL ASSESSMENT: PAIN_FUNCTIONAL_ASSESSMENT: 0-10

## 2024-03-06 NOTE — PROGRESS NOTES
"Physical Therapy Treatment    Patient Name: Sharonda Yeboah  MRN: [id number]  Today's Date: 3/6/2024  Time Calculation  Start Time: 1110  Stop Time: 1150  Time Calculation (min): 40 min  PT Therapeutic Procedures Time Entry  Manual Therapy Time Entry: 38  Visit 9/12    Insurance:  Visit number: 6 of 10  Authorization info: 10 visits thru 2/1-4/30  Insurance Type: [facility]: [facility] / Plan: [facility] [employer] [place] / Product Type: *No Product type* /     Current Problem   1. Lymphedema syndrome, postmastectomy        2. Postmastectomy lymphedema syndrome  Follow Up In Physical Therapy        Subjective   Denies pain in breast today.  Continues to states that left breast feels \"lighter\" but still a lot larger than right.    Precautions  Precautions Comment: lymphedema    Pain Assessment: 0-10  Pain Score: 0 - No pain  Pain Location: Breast  Pain Orientation: Left  Post Treatment Pain Level 0    Objective   Left breast non-pitting edema    Treatments:  Manual:      --manual lymph drainage secondary left UE protocol with emphasis on truncal decongestion, parasternal lymph node stimulation. No abdominal sequence due to hernia; pt performed diaphragmatic breathing.     Assessment   Assessment:   PT Assessment  Assessment Comment: Pain and skin condition improving left breast; decreased fibrosis noted. However, edema persists.    Plan:    Cont per POC    OP EDUCATION:  Outpatient Education  Individual(s) Educated: Patient  Education Provided: Lymphedema care    Goals:   Active       PT Problem       PT Goal 1 (Progressing)       Start:  11/03/23    Expected End:  04/21/24       Pt independent with lymphedema risk-reduction strategies         PT Goal 2 (Progressing)       Start:  11/03/23    Expected End:  04/21/24       Improve LLIS to no more than 15% impairment         PT Goal 3 (Progressing)       Start:  11/03/23    Expected End:  04/21/24       Pt independent with lymphedema self-care/self-management " strategies         PT Goal 4 (Progressing)       Start:  11/03/23    Expected End:  04/21/24       Reduce frequency and intensity of pain in left breast by at least 60%

## 2024-03-11 ENCOUNTER — APPOINTMENT (OUTPATIENT)
Dept: PHYSICAL THERAPY | Facility: CLINIC | Age: 54
End: 2024-03-11
Payer: COMMERCIAL

## 2024-03-29 ENCOUNTER — DOCUMENTATION (OUTPATIENT)
Dept: PHYSICAL THERAPY | Facility: CLINIC | Age: 54
End: 2024-03-29
Payer: COMMERCIAL

## 2024-03-29 ENCOUNTER — APPOINTMENT (OUTPATIENT)
Dept: PHYSICAL THERAPY | Facility: CLINIC | Age: 54
End: 2024-03-29
Payer: COMMERCIAL

## 2024-03-29 NOTE — PROGRESS NOTES
Physical Therapy                 Therapy Communication Note    Patient Name: Sharonda Yeboah  MRN: [id number]  Today's Date: 3/29/2024     Discipline: Physical Therapy    Missed Visit Reason:      Missed Time: Cancel    Comment: Pt arrived at wrong time

## 2024-04-04 ENCOUNTER — LAB (OUTPATIENT)
Dept: LAB | Facility: HOSPITAL | Age: 54
End: 2024-04-04
Payer: COMMERCIAL

## 2024-04-04 ENCOUNTER — OFFICE VISIT (OUTPATIENT)
Dept: HEMATOLOGY/ONCOLOGY | Facility: HOSPITAL | Age: 54
End: 2024-04-04
Payer: COMMERCIAL

## 2024-04-04 VITALS
DIASTOLIC BLOOD PRESSURE: 86 MMHG | SYSTOLIC BLOOD PRESSURE: 135 MMHG | OXYGEN SATURATION: 97 % | TEMPERATURE: 98.1 F | HEART RATE: 97 BPM | WEIGHT: 188.27 LBS | RESPIRATION RATE: 18 BRPM | BODY MASS INDEX: 32.3 KG/M2

## 2024-04-04 DIAGNOSIS — Z12.31 ENCOUNTER FOR SCREENING MAMMOGRAM FOR MALIGNANT NEOPLASM OF BREAST: ICD-10-CM

## 2024-04-04 DIAGNOSIS — C77.3 BREAST CANCER METASTASIZED TO AXILLARY LYMPH NODE, LEFT (MULTI): ICD-10-CM

## 2024-04-04 DIAGNOSIS — I82.90 VTE (VENOUS THROMBOEMBOLISM): Primary | ICD-10-CM

## 2024-04-04 DIAGNOSIS — C50.912 BREAST CANCER METASTASIZED TO AXILLARY LYMPH NODE, LEFT (MULTI): ICD-10-CM

## 2024-04-04 DIAGNOSIS — I82.90 VTE (VENOUS THROMBOEMBOLISM): ICD-10-CM

## 2024-04-04 LAB
ALBUMIN SERPL BCP-MCNC: 3.6 G/DL (ref 3.4–5)
ALP SERPL-CCNC: 89 U/L (ref 33–110)
ALT SERPL W P-5'-P-CCNC: 16 U/L (ref 7–45)
ANION GAP SERPL CALC-SCNC: 12 MMOL/L (ref 10–20)
AST SERPL W P-5'-P-CCNC: 16 U/L (ref 9–39)
BASOPHILS # BLD AUTO: 0.02 X10*3/UL (ref 0–0.1)
BASOPHILS NFR BLD AUTO: 0.4 %
BILIRUB SERPL-MCNC: 0.4 MG/DL (ref 0–1.2)
BUN SERPL-MCNC: 14 MG/DL (ref 6–23)
CALCIUM SERPL-MCNC: 8.8 MG/DL (ref 8.6–10.3)
CHLORIDE SERPL-SCNC: 106 MMOL/L (ref 98–107)
CO2 SERPL-SCNC: 23 MMOL/L (ref 21–32)
CREAT SERPL-MCNC: 0.76 MG/DL (ref 0.5–1.05)
D DIMER PPP FEU-MCNC: 315 NG/ML FEU
EGFRCR SERPLBLD CKD-EPI 2021: >90 ML/MIN/1.73M*2
EOSINOPHIL # BLD AUTO: 0.02 X10*3/UL (ref 0–0.7)
EOSINOPHIL NFR BLD AUTO: 0.4 %
ERYTHROCYTE [DISTWIDTH] IN BLOOD BY AUTOMATED COUNT: 15.9 % (ref 11.5–14.5)
GLUCOSE SERPL-MCNC: 90 MG/DL (ref 74–99)
HCT VFR BLD AUTO: 35.7 % (ref 36–46)
HGB BLD-MCNC: 11.6 G/DL (ref 12–16)
IMM GRANULOCYTES # BLD AUTO: 0.01 X10*3/UL (ref 0–0.7)
IMM GRANULOCYTES NFR BLD AUTO: 0.2 % (ref 0–0.9)
LYMPHOCYTES # BLD AUTO: 1.15 X10*3/UL (ref 1.2–4.8)
LYMPHOCYTES NFR BLD AUTO: 22.1 %
MCH RBC QN AUTO: 27.7 PG (ref 26–34)
MCHC RBC AUTO-ENTMCNC: 32.5 G/DL (ref 32–36)
MCV RBC AUTO: 85 FL (ref 80–100)
MONOCYTES # BLD AUTO: 0.37 X10*3/UL (ref 0.1–1)
MONOCYTES NFR BLD AUTO: 7.1 %
NEUTROPHILS # BLD AUTO: 3.64 X10*3/UL (ref 1.2–7.7)
NEUTROPHILS NFR BLD AUTO: 69.8 %
NRBC BLD-RTO: 0 /100 WBCS (ref 0–0)
PLATELET # BLD AUTO: 250 X10*3/UL (ref 150–450)
POTASSIUM SERPL-SCNC: 4 MMOL/L (ref 3.5–5.3)
PROT SERPL-MCNC: 6.5 G/DL (ref 6.4–8.2)
RBC # BLD AUTO: 4.19 X10*6/UL (ref 4–5.2)
SODIUM SERPL-SCNC: 137 MMOL/L (ref 136–145)
WBC # BLD AUTO: 5.2 X10*3/UL (ref 4.4–11.3)

## 2024-04-04 PROCEDURE — 85379 FIBRIN DEGRADATION QUANT: CPT

## 2024-04-04 PROCEDURE — 3008F BODY MASS INDEX DOCD: CPT | Performed by: INTERNAL MEDICINE

## 2024-04-04 PROCEDURE — 80053 COMPREHEN METABOLIC PANEL: CPT

## 2024-04-04 PROCEDURE — 36415 COLL VENOUS BLD VENIPUNCTURE: CPT

## 2024-04-04 PROCEDURE — 85025 COMPLETE CBC W/AUTO DIFF WBC: CPT

## 2024-04-04 PROCEDURE — 99214 OFFICE O/P EST MOD 30 MIN: CPT | Performed by: INTERNAL MEDICINE

## 2024-04-04 ASSESSMENT — PAIN SCALES - GENERAL: PAINLEVEL: 0-NO PAIN

## 2024-04-07 NOTE — PROGRESS NOTES
DIVISION OF MEDICAL ONCOLOGY    Patient ID: Sharonda Yeboah is a 53 y.o. female.  MRN: [id number]  : 1970  The patient presents to clinic today for her history of left-sided breast cancer.    Diagnostic/Therapeutic History:  -21: Bilateral screening mammogram was negative.  -Palpated left axillary lump. Saw PCP.  -10/5/22: Diagnostic mammogram/US showed a 3.6 x 3.0 x 3.4 cm irregular bilobed mass at 2:00, 7 cm FN. Four abnormal left axillary LN’s seen. In the right axilla, a 4.1 cm superficial fatty mass noted, likely a lipoma.  -10/12/22: US-guided CNB confirmed IDC, grade 3, ER/UT-negative Her2-negative (1+ IHC). A left axillary LN was positive for metastatic carcinoma.  -10/19/22: Breast MRI confirmed a 4.4 cm irregular mass with central necrosis in the left breast. Two bulky level 1 axillary LN’s seen (no level 2/3 LN’s identified). A peripheral rim-enhancing mass seen in left internal mammary region,  measuring 0.9 x 0.8 cm, concerning for malignant lymph node. Left axillary skin thickening seen.  -Neoadjuvant chemotherapy with [id number] regimen, 22-23. She required dose-reductions for SBO's that were managed conservatively.  -23: Left lumpectomy/SLNBx performed. pathology: pCR to therapy.  -Received 3 doses of adjuvant pembrolizumab, but decided to stop for abdominal symptoms and diarrhea.  -23 - 23: Radiation therapy to the left breast, SCV consisting of a dose of 42.56 Gy with additional 10 Gy to the tumor bed for a total of 52.56 Gy.     History of Present Illness (HPI)/Interval History:  Sharonda Yeboah presents today for routine FUV.  Overall, she has been feeling well since her last appointment.  Ongoing left breast edema and discomfort noted, improved with PT.  Notes some fatigue, but able to perform her usual activities.  She is off Eliquis. No edema or dyspnea/cough.  She is going to have a left knee replacement at some point in the future.    Review of  Systems:  14-point ROS otherwise negative, as per HPI/Interval History.    Past Medical History:   Diagnosis Date    Abnormal levels of other serum enzymes 04/07/2020    Elevated alkaline phosphatase level    Acute candidiasis of vulva and vagina 12/23/2019    Vaginal candidiasis    Breast cancer (CMS/HCC) 05/01/2023    left lumpectomy w/rad & chemo    Change in bowel habit 07/19/2017    Encounter for diagnostic colonoscopy due to change in bowel habits    Contact with and (suspected) exposure to potentially hazardous body fluids 07/25/2017    History of exposure to hazardous bodily fluids    Dorsalgia, unspecified 06/17/2015    Acute back pain    Encounter for follow-up examination after completed treatment for conditions other than malignant neoplasm 02/17/2018    Hospital discharge follow-up    Encounter for screening for respiratory tuberculosis 07/25/2017    Encounter for screening for respiratory tuberculosis    Endometriosis, unspecified 10/18/2022    Endometriosis    Fever, unspecified 12/28/2021    Fever and chills    Functional intestinal disorder, unspecified 10/18/2022    Functional bowel disorder    Generalized abdominal pain 06/06/2017    Generalized abdominal pain    Hx antineoplastic chemo     Low back pain, unspecified 10/18/2022    Lumbar pain    Lymphedema syndrome, postmastectomy 11/03/2023    Other conditions influencing health status     Arthritis    Other conditions influencing health status     History of dyspareunia    Other specified cough 09/25/2019    Productive cough    Pain in left knee 12/29/2016    Left knee pain    Personal history of diseases of the blood and blood-forming organs and certain disorders involving the immune mechanism 07/25/2017    History of anemia    Personal history of diseases of the blood and blood-forming organs and certain disorders involving the immune mechanism 07/25/2017    History of anemia    Personal history of diseases of the skin and subcutaneous tissue  06/06/2017    History of eczema    Personal history of irradiation     Personal history of other diseases of the digestive system 07/19/2017    History of constipation    Personal history of other diseases of the digestive system     History of esophageal reflux    Personal history of other diseases of the musculoskeletal system and connective tissue 02/16/2017    History of neck pain    Personal history of other diseases of the musculoskeletal system and connective tissue 01/19/2017    History of back pain    Personal history of other diseases of the nervous system and sense organs     History of migraine headaches    Personal history of other diseases of the respiratory system 12/31/2020    History of acute bacterial sinusitis    Personal history of other diseases of the respiratory system 01/21/2021    History of sore throat    Personal history of other diseases of the respiratory system 06/04/2019    History of acute bronchitis    Personal history of other mental and behavioral disorders 10/18/2022    History of depression    Personal history of other specified conditions 06/06/2017    History of exertional chest pain    Personal history of other specified conditions 08/02/2017    History of abdominal pain    Personal history of other specified conditions 10/23/2020    History of fatigue    Personal history of other specified conditions 07/25/2017    History of fatigue    Personal history of other specified conditions 02/17/2018    History of diarrhea    Plantar fascial fibromatosis 03/22/2014    Plantar fasciitis, right    Sprain of unspecified rotator cuff capsule, initial encounter 10/18/2022    Rotator cuff (capsule) sprain    Tobacco abuse counseling 07/25/2017    Encounter for tobacco use cessation counseling     Patient Active Problem List   Diagnosis    Breast cancer metastasized to axillary lymph node, left (CMS/HCC)    Carpal tunnel syndrome, right    Cervical spinal stenosis    Goiter    Hidradenitis  suppurativa    Mass of axillary tail of left breast    Uterine fibroid    Vasomotor symptoms due to menopause    Vitamin D deficiency    Medicare annual wellness visit, subsequent    Acute deep vein thrombosis (DVT) of distal vein of left lower extremity (CMS/HCC)    Status post partial mastectomy of left breast    S/P radiation therapy    S/P chemotherapy, time since greater than 12 weeks    Lymphedema syndrome, postmastectomy        Past Surgical History:   Procedure Laterality Date    BREAST LUMPECTOMY Left 2023    left lumpectomy w/rad & chemo     SECTION, CLASSIC  10/18/2022     Section    CHOLECYSTECTOMY  2013    Cholecystectomy    CT ABDOMEN PELVIS ANGIOGRAM W AND/OR WO IV CONTRAST  2023    CT ABDOMEN PELVIS ANGIOGRAM W AND/OR WO IV CONTRAST [facility] CT    HERNIA REPAIR  10/18/2022    Hernia Repair    HYSTEROSCOPY  2014    Hysteroscopy With Endometrial Ablation    OTHER SURGICAL HISTORY  2013    Laparoscopic Appendectomy Incidental    OTHER SURGICAL HISTORY  2013    Craniotomy (Therapeutic)    OTHER SURGICAL HISTORY  2013    Arthrotomy Of Knee With Lateral Meniscectomy    OTHER SURGICAL HISTORY  10/18/2022    Intracranial Aneurysm Repair    ROTATOR CUFF REPAIR  10/18/2022    Rotator Cuff Repair    SHOULDER SURGERY  2014    Shoulder Surgery Right    TUBAL LIGATION  10/18/2022    Tubal Ligation    UMBILICAL HERNIA REPAIR  2013    Umbilical Hernia Repair       Social History     Socioeconomic History    Marital status: Single     Spouse name: Not on file    Number of children: 4    Years of education: Not on file    Highest education level: Not on file   Occupational History    Not on file   Tobacco Use    Smoking status: Former     Years: 5     Types: Cigarettes     Quit date: 10/1/2022     Years since quittin.5    Smokeless tobacco: Never   Substance and Sexual Activity    Alcohol use: Not Currently    Drug use: Never    Sexual activity: Not  Currently     Birth control/protection: Post-menopausal   Other Topics Concern    Not on file   Social History Narrative    Not on file     Social Determinants of Health     Financial Resource Strain: Not on file   Food Insecurity: Not on file   Transportation Needs: Not on file   Physical Activity: Not on file   Stress: Not on file   Social Connections: Not on file   Intimate Partner Violence: Not on file   Housing Stability: Not on file       Family History   Problem Relation Name Age of Onset    Hypertension Mother      Diabetes type II Mother         Allergies:   No Known Allergies      Current Outpatient Medications:     benzoyl peroxide (Benzac AC) 10 % external wash, Apply topically once daily. As directed leave on for 5 min then rinse, Disp: 227 g, Rfl: 11    calcium carbonate (Oscal) 500 mg calcium (1,250 mg) tablet, Take 1 tablet (1,250 mg) by mouth 2 times a day with meals., Disp: , Rfl:     GENERIC EXTERNAL MEDICATION, Take 1 capsule by mouth once daily. Potassium, Disp: , Rfl:     minocycline 100 mg capsule, TAKE 1 CAPSULE BY MOUTH TWICE DAILY WITH FOOD AND A GLASS OF WATER, Disp: , Rfl:     multivitamin tablet, Take 1 tablet by mouth once daily., Disp: , Rfl:     omeprazole (PriLOSEC) 40 mg DR capsule, Take 1 capsule (40 mg) by mouth once daily., Disp: , Rfl:      Objective    BSA: 1.96 meters squared  /86 (BP Location: Right arm, Patient Position: Sitting)   Pulse 97   Temp 36.7 °C (98.1 °F) (Core)   Resp 18   Wt 85.4 kg (188 lb 4.4 oz)   SpO2 97%   BMI 32.30 kg/m²     ECOG Performance Status:  The ECOG performance scale today is ECO- Restricted in physically strenuous activity.  Carries out light duty.    Physical Exam  Vitals reviewed.   Constitutional:       General: She is not in acute distress.     Appearance: Normal appearance. She is not ill-appearing or diaphoretic.   HENT:      Head: Normocephalic and atraumatic.   Eyes:      General: No scleral icterus.  Cardiovascular:       Rate and Rhythm: Normal rate and regular rhythm.      Heart sounds: No murmur heard.  Pulmonary:      Effort: Pulmonary effort is normal. No respiratory distress.      Breath sounds: Normal breath sounds. No wheezing.   Chest:   Breasts:     Left: Skin change and tenderness present. No bleeding or mass.      Comments: Left breast lymphedema noted, improved from last appointment.  Musculoskeletal:      Cervical back: Normal range of motion and neck supple. No tenderness.   Lymphadenopathy:      Cervical: No cervical adenopathy.      Upper Body:      Right upper body: No supraclavicular adenopathy.      Left upper body: No supraclavicular or axillary adenopathy.   Skin:     General: Skin is warm and dry.      Coloration: Skin is not jaundiced.      Findings: No rash.      Comments: Bilateral axillary hidradenitis suppurativa.    Neurological:      General: No focal deficit present.      Mental Status: She is alert and oriented to person, place, and time. Mental status is at baseline.      Gait: Gait normal.   Psychiatric:         Mood and Affect: Mood normal.         Behavior: Behavior normal.         Laboratory Data:  Lab Results   Component Value Date    WBC 5.2 04/04/2024    HGB 11.6 (L) 04/04/2024    HCT 35.7 (L) 04/04/2024    MCV 85 04/04/2024     04/04/2024    ANC 2.87 08/04/2023       Chemistry    Lab Results   Component Value Date/Time     04/04/2024 1154    K 4.0 04/04/2024 1154     04/04/2024 1154    CO2 23 04/04/2024 1154    BUN 14 04/04/2024 1154    CREATININE 0.76 04/04/2024 1154    Lab Results   Component Value Date/Time    CALCIUM 8.8 04/04/2024 1154    ALKPHOS 89 04/04/2024 1154    AST 16 04/04/2024 1154    ALT 16 04/04/2024 1154    BILITOT 0.4 04/04/2024 1154        Radiology:  === 02/06/24 ===    CT ANGIO CHEST FOR PULMONARY EMBOLISM    - Impression -  1. No evidence of acute pulmonary embolism.  2. Chronic radiation changes and traction bronchiectasis of the left  upper lobe.  3.  Postsurgical changes in the left breast status post  lumpectomy/left axillary lymph node dissection as described above.  There is nonspecific soft tissue thickening/stranding/edema involving  the deep tissues of the visualized portions of the left breast and  chest wall. Findings are favored to represent postradiation changes.  However recommend clinical with physical exam if clinical concern for  mastitis. Also possibility of locoregional disease/tumor recurrence  cannot be excluded. Correlate clinically and consider correlate with  dedicated breast imaging.  4. Stable 2 mm calcified granuloma involving the left upper lobe as  described above.    I personally reviewed the images/study and I agree with the findings  as stated by resident Levi Barriga this study was interpreted at  [place], [place].    MACRO:  None    Signed by: Elizabeth Ramos 2/6/2024 2:31 PM  Dictation workstation:   [id number]      Assessment/Plan:  Sharonda Yeboah is a 53 y.o. female with a history of left-sided triple-negative breast cancer, who presents today for follow-up evaluation on observation.     1. Left breast cancer. Stage IIIC (cT2-3 cN3b cM0), grade 3 IDC, triple-negative.    S/p neoadjuvant chemoimmunotherapy with pCR obtained. S/p lumpectomy and adjuvant RT.  - No concerning symptoms or exam findings reported today.  - Ongoing left breast lymphedema noted. Following with PT, slightly improved.  - Mammogram unremarkable 10/2023. Continue yearly screening.     2. Hidradenitis suppurativa. Diagnosed in late-teens. S/p multiple lines of therapy, including axillary surgery.  - Following with Dr. Jamia Carter from Dermatology. On minocycline.     3. H/o recurrent SBO. S/p multiple hernia repair surgeries and prior bowel resection for SBO.  - Last admitted 12/2023, managed conservatively. Currently no symptoms.     4. LLE DVT. Provoked in the setting of chemotherapy and prior  hospitalization.  US done on 7/7/23 showed stable chronic DVT.  - FU Dopplers after 3 months of anticoagulation showed chronic thrombus.  - Repeat Dopplers 1/2024 negative. D-dimer normal. Eliquis stopped 1/2024.  - D-dimer normal today.    Disposition.  - RTC 6 months with mammogram.  - She has our contact information and was instructed to call with concerns/questions in the interim.    Orders Placed This Encounter   Procedures    BI mammo bilateral screening    CBC and Auto Differential    Comprehensive Metabolic Panel    D-dimer, Non VTE      Abebe Hamlin MD  Hematology and Medical Oncology  [facility]

## 2024-05-09 ENCOUNTER — APPOINTMENT (OUTPATIENT)
Dept: PHYSICAL THERAPY | Facility: CLINIC | Age: 54
End: 2024-05-09
Payer: COMMERCIAL

## 2024-05-09 ENCOUNTER — DOCUMENTATION (OUTPATIENT)
Dept: PHYSICAL THERAPY | Facility: CLINIC | Age: 54
End: 2024-05-09

## 2024-05-09 NOTE — PROGRESS NOTES
Physical Therapy                 Therapy Communication Note    Patient Name: Sharonda Yeboah  MRN: [id number]  Today's Date: 5/9/2024     Discipline: Physical Therapy    Missed Visit Reason:      Missed Time: Cancel    Comment:

## 2024-06-12 ENCOUNTER — TELEPHONE (OUTPATIENT)
Dept: RADIATION ONCOLOGY | Facility: HOSPITAL | Age: 54
End: 2024-06-12
Payer: COMMERCIAL

## 2024-06-12 NOTE — TELEPHONE ENCOUNTER
Called pt to remind of appointment on 6/13/24  at 12:45 Pt's phone went to voicemail left number if needs to reschedule.   Benny Claudio MA

## 2024-07-02 ENCOUNTER — APPOINTMENT (OUTPATIENT)
Dept: PHYSICAL THERAPY | Facility: CLINIC | Age: 54
End: 2024-07-02
Payer: COMMERCIAL

## 2024-07-02 DIAGNOSIS — I97.2 LYMPHEDEMA SYNDROME, POSTMASTECTOMY: Primary | ICD-10-CM

## 2024-07-02 DIAGNOSIS — I97.2 POSTMASTECTOMY LYMPHEDEMA SYNDROME: ICD-10-CM

## 2024-07-02 PROCEDURE — 97140 MANUAL THERAPY 1/> REGIONS: CPT | Mod: GP

## 2024-07-02 ASSESSMENT — PAIN - FUNCTIONAL ASSESSMENT: PAIN_FUNCTIONAL_ASSESSMENT: 0-10

## 2024-07-02 ASSESSMENT — PAIN SCALES - GENERAL: PAINLEVEL_OUTOF10: 0 - NO PAIN

## 2024-07-02 NOTE — PROGRESS NOTES
Physical Therapy Treatment/Progress Note    Patient Name: Sharonda Yeboah  MRN: [id number]  Today's Date: 7/2/2024  Time Calculation  Start Time: 1100  Stop Time: 1155  Time Calculation (min): 55 min  PT Therapeutic Procedures Time Entry  Manual Therapy Time Entry: 53  Visit 10/12    Insurance:  Visit number: 1 of 10  Authorization info: 10 visits 4/10-7/31  Insurance Type: Payor: [employer] [facility] / Plan: [employer] [place] / Product Type: *No Product type* /     Current Problem   1. Lymphedema syndrome, postmastectomy        2. Postmastectomy lymphedema syndrome  Follow Up In Physical Therapy          Subjective   Pt returns to therapy after 4 month absence. Reports that left breast is still very swollen but denies pain.  Has been wearing a bra that she ordered off [employer]; not sure if it is a compression bra.  Went to [facility] but they do not accept her insurance.     Precautions  Medical Precautions: Lymphedema precautions    Pain Assessment: 0-10  0-10 (Numeric) Pain Score: 0 - No pain  Post Treatment Pain Level 0    Objective   UE girth:          Left    Hand           18.4cm    Wrist           15.0cm    8cm             19.4cm     16cm           25.3cm    24cm           27.6cm    32cm           30.0cm    40cm           33.0cm  Left breast visibly larger than right; soft, non-fibrotic.  Peau d'orange skin with significant radiation damage    Treatments:  Manual:    --manual lymph drainage secondary left UE protocol with emphasis on truncal decongestion, parasternal lymph node stimulation. No abdominal sequence due to hernia; pt performed diaphragmatic breathing.     --objective measures    Assessment   PT Assessment  Assessment Comment: Pt continues to have significant lymphedema in left breast with associated skin changes.  Pt has been partially compliant with HEP.    Plan:    Extend POC 2x week for 4 weeks or 8 more visits. Focus on MLD to reduce edema.  Pt also given info for [employer]  bra to order.    OP EDUCATION:  Outpatient Education  Individual(s) Educated: Patient  Education Provided: Lymphedema care    Goals:   Active       PT Problem       PT Goal 1 (Progressing)       Start:  11/03/23    Expected End:  09/30/24       Pt independent with lymphedema risk-reduction strategies         PT Goal 2 (Progressing)       Start:  11/03/23    Expected End:  09/30/24       Improve LLIS to no more than 15% impairment         PT Goal 3 (Progressing)       Start:  11/03/23    Expected End:  09/30/24       Pt independent with lymphedema self-care/self-management strategies         PT Goal 4 (Progressing)       Start:  11/03/23    Expected End:  09/30/24       Reduce frequency and intensity of pain in left breast by at least 60%

## 2024-07-11 ENCOUNTER — APPOINTMENT (OUTPATIENT)
Dept: DERMATOLOGY | Facility: CLINIC | Age: 54
End: 2024-07-11
Payer: COMMERCIAL

## 2024-07-18 ENCOUNTER — TELEPHONE (OUTPATIENT)
Dept: PRIMARY CARE | Facility: CLINIC | Age: 54
End: 2024-07-18
Payer: MEDICAID

## 2024-07-22 ENCOUNTER — TELEPHONE (OUTPATIENT)
Dept: RADIATION ONCOLOGY | Facility: HOSPITAL | Age: 54
End: 2024-07-22
Payer: MEDICARE

## 2024-07-22 NOTE — TELEPHONE ENCOUNTER
Called pt to remind of appointment on 7/23/24 at 11:30 Pt confirmed appointment.     Benny Claudio MA

## 2024-07-23 ENCOUNTER — HOSPITAL ENCOUNTER (OUTPATIENT)
Dept: RADIATION ONCOLOGY | Facility: HOSPITAL | Age: 54
Setting detail: RADIATION/ONCOLOGY SERIES
Discharge: HOME | End: 2024-07-23
Payer: MEDICARE

## 2024-07-23 VITALS
OXYGEN SATURATION: 100 % | HEART RATE: 78 BPM | DIASTOLIC BLOOD PRESSURE: 79 MMHG | SYSTOLIC BLOOD PRESSURE: 119 MMHG | WEIGHT: 184.2 LBS | TEMPERATURE: 97.7 F | BODY MASS INDEX: 31.45 KG/M2 | RESPIRATION RATE: 18 BRPM | HEIGHT: 64 IN

## 2024-07-23 DIAGNOSIS — Z17.1 MALIGNANT NEOPLASM OF LEFT BREAST IN FEMALE, ESTROGEN RECEPTOR NEGATIVE, UNSPECIFIED SITE OF BREAST (MULTI): Primary | ICD-10-CM

## 2024-07-23 DIAGNOSIS — C77.3 BREAST CANCER METASTASIZED TO AXILLARY LYMPH NODE, LEFT (MULTI): ICD-10-CM

## 2024-07-23 DIAGNOSIS — C50.912 MALIGNANT NEOPLASM OF LEFT BREAST IN FEMALE, ESTROGEN RECEPTOR NEGATIVE, UNSPECIFIED SITE OF BREAST (MULTI): Primary | ICD-10-CM

## 2024-07-23 DIAGNOSIS — C50.912 BREAST CANCER METASTASIZED TO AXILLARY LYMPH NODE, LEFT (MULTI): ICD-10-CM

## 2024-07-23 PROCEDURE — 99213 OFFICE O/P EST LOW 20 MIN: CPT | Performed by: NURSE PRACTITIONER

## 2024-07-23 ASSESSMENT — ENCOUNTER SYMPTOMS
OCCASIONAL FEELINGS OF UNSTEADINESS: 0
DEPRESSION: 0
LOSS OF SENSATION IN FEET: 0

## 2024-07-23 ASSESSMENT — PATIENT HEALTH QUESTIONNAIRE - PHQ9
1. LITTLE INTEREST OR PLEASURE IN DOING THINGS: NOT AT ALL
2. FEELING DOWN, DEPRESSED OR HOPELESS: NOT AT ALL
SUM OF ALL RESPONSES TO PHQ9 QUESTIONS 1 AND 2: 0

## 2024-07-23 ASSESSMENT — PAIN SCALES - GENERAL: PAINLEVEL: 0-NO PAIN

## 2024-07-23 NOTE — PROGRESS NOTES
Radiation Oncology Follow-Up    Patient Name:  Sharonda Yeboah  MRN:  [id number]  :  1970    Referring Provider: Claribel Zambrano, APR*  Primary Care Provider: Pati Mchugh MD  Care Team: Patient Care Team:  Pati Mchugh MD as PCP - General (Family Medicine)  Mandie Reyes APRN-CNP as PCP - [facility] PCP  Abebe Hamlin MD as Consulting Physician (Hematology and Oncology)    Date of Service: 2024     Cancer Staging:          Breast         AJCC Edition: 8th (AJCC), Diagnosis Date: Oct 2022, IIIC, cT2 cN3b cM0 G3     Treatment Synopsis:    55 yo postmenopausal female with [id number] G3 IDC TNBC (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     Care Team:  Surgery: Dr. Zora Reyes  Med Onc: Dr. Abebe Hamlin  Rad Onc: Dr. Noel Hawkins     Treatment Summary:      -21: Bilateral screening mammogram was negative.  -Palpated left axillary lump. Saw PCP.  -10/5/22: Diagnostic mammogram/US showed a 3.6 x 3.0 x 3.4 cm irregular bilobed mass at 2:00, 7 cm FN. Four abnormal left axillary LN’s seen. In the right axilla, a 4.1 cm superficial fatty mass noted, likely a lipoma.  -10/12/22: US-guided CNB confirmed IDC, grade 3, ER/WA-negative Her2-negative (1+ IHC). A left axillary LN was positive for metastatic carcinoma.  -10/19/22: Breast MRI confirmed a 4.4 cm irregular mass with central necrosis in the left breast. Two bulky level 1 axillary LN’s seen (no level 2/3 LN’s identified). A peripheral rim-enhancing mass seen in left internal mammary region,  measuring 0.9 x 0.8 cm, concerning for malignant lymph node. Left axillary skin thickening seen.  -Neoadjuvant chemotherapy with KEYNOTE-522 regimen, 22-23. She required dose-reductions for SBO's that were managed conservatively.  -23: Left lumpectomy/SLNBx performed.  pathology: pCR   -7/21/23 - 8/17/23: Radiation therapy to the left breast, SCV consisting of a dose of 42.56 Gy with additional 10 Gy to the tumor bed for a total of 52.56 Gy.      Treatment History:    Neoadjuvant AC-T/carboplatin with pembrolizumab (KEYNOTE-522):  Carboplatin/paclitaxel/pembrolizumab (weekly)  C1D1 11/4/22  C2D1 11/25/22  - Week #5 (C2D8) delayed due to ER visit for partial SBO at prior anastomotic site. Managed conservatively.  C3D1 12/23/22  C4D1 1/13/23  Doxorubicin/cyclophosphamide/pembrolizumab Q3 weeks   C1D1 2/3/23; Complicated by partial SBO, managed conservatively.  C2D1 2/24/23; dose-reduced 20%. Complicated by LLE DVT.  C3D1 3/16/23.  C4D1 4/7/23    SUBJECTIVE  History of Present Illness:   Sharonda Yeboah is here today for routine radiation follow up/surveillance visit.  She is doing well however continues to have tenderness with some swelling in left breast.  It is improving however and skin is overall intact. She suffers with long standing suppurative hydradenitis and sees dermatology routinely. She has developed flare up in left axilla.  She is currently on antibiotics routinely and uses warm compresses prn. She wears a sports compression bra daily. She says the breast feels heavy. She continues PT for breast lymphedema massage. No swelling of left arm or difficulty with ROM.  Denies headaches,  fever, chills, cough, SOB, chest pain, N/V or bony pain. Due for mammogram in October.     Review of Systems:    Review of Systems   All other systems reviewed and are negative.    Performance Status:   The Karnofsky performance scale today is 90, Able to carry on normal activity; minor signs or symptoms of disease (ECOG equivalent 0).      OBJECTIVE    Current Outpatient Medications:     benzoyl peroxide (Benzac AC) 10 % external wash, Apply topically once daily. As directed leave on for 5 min then rinse, Disp: 227 g, Rfl: 11    calcium carbonate (Oscal) 500 mg calcium (1,250 mg) tablet,  Take 1 tablet (1,250 mg) by mouth 2 times a day with meals., Disp: , Rfl:     GENERIC EXTERNAL MEDICATION, Take 1 capsule by mouth once daily. Potassium, Disp: , Rfl:     minocycline 100 mg capsule, TAKE 1 CAPSULE BY MOUTH TWICE DAILY WITH FOOD AND A GLASS OF WATER, Disp: , Rfl:     multivitamin tablet, Take 1 tablet by mouth once daily., Disp: , Rfl:     omeprazole (PriLOSEC) 40 mg DR capsule, Take 1 capsule (40 mg) by mouth once daily., Disp: , Rfl:      Physical Exam  Constitutional:       Appearance: Normal appearance.   HENT:      Head: Normocephalic and atraumatic.      Nose: Nose normal.      Mouth/Throat:      Pharynx: Oropharynx is clear.   Eyes:      Conjunctiva/sclera: Conjunctivae normal.      Pupils: Pupils are equal, round, and reactive to light.   Cardiovascular:      Rate and Rhythm: Normal rate and regular rhythm.      Heart sounds: Normal heart sounds.   Pulmonary:      Effort: Pulmonary effort is normal.      Breath sounds: Normal breath sounds.   Chest:   Breasts:     Right: Normal. No swelling, inverted nipple, mass or nipple discharge.      Left: No inverted nipple, mass or nipple discharge.      Comments: Left breast with well healed surgical incision.  Skin is intact with noted patchy pigmentation in intertriginous area and central breast thickening, darkening of skin. No suspicious palpable masses. Mild swelling  Abdominal:      Palpations: Abdomen is soft.   Musculoskeletal:         General: No swelling. Normal range of motion.      Cervical back: Normal range of motion and neck supple.   Lymphadenopathy:      Cervical: No cervical adenopathy.      Upper Body:      Right upper body: No supraclavicular or axillary adenopathy.      Left upper body: No supraclavicular or axillary (Left axillary hydradenitis with boil. Tender on exam.) adenopathy.   Skin:     General: Skin is warm and dry.   Neurological:      General: No focal deficit present.      Mental Status: She is alert and oriented to  person, place, and time.   Psychiatric:         Mood and Affect: Mood normal.         Behavior: Behavior normal.         RESULTS:  Narrative & Impression   Interpreted By:  Taisha Rahman  and Jude Ma   STUDY:  BI MAMMO BILATERAL DIAGNOSTIC TOMOSYNTHESIS; BI US BREAST LIMITED  LEFT;  10/24/2023 3:39 pm; 10/24/2023 4:04 pm      ACCESSION NUMBER(S):  [id number]; [id number]      ORDERING CLINICIAN:  OG QUAN      INDICATION:  Palpable left breast lump. History of left lumpectomy and radiation.  1st postoperative exam.      COMPARISON:  Specimen x-ray 05/24/2023, 10/05/2022, 08/24/2021, 05/19/2020      FINDINGS:  MAMMOGRAPHY: 2D and tomosynthesis images were reviewed at 1 mm slice  thickness.      Density:  There are areas of scattered fibroglandular tissue.      A metallic radiopaque marker was placed by the patient at the site of  her focal lump in the upper outer quadrant of the left breast at mid  depth. Postlumpectomy changes are seen deep to the radiopaque marker  with scarring and surgical clips. Subtle circumscribed fat containing  masses are also seen superficial to the lumpectomy site. Diffuse  moderate to marked trabecular and skin thickening throughout the left  breast consistent with post radiation change. Stable benign asymmetry  in the deep central right breast. No suspicious masses or  calcifications are identified in either breast.      ULTRASOUND:  Targeted ultrasound of the palpable area of concern in  the left breast was performed by a registered sonographer using  elastography. Two superficial heterogenous but predominantly  hyperechoic masses are seen at the 2 o'clock position, the mass 10 cm  from the nipple measures 1.1 x 1.2 x 1.0 cm and the mass 11 cm from  the nipple measures 1.2 x 1.2 x 1.2 cm. They are heterogenous on  elastography and are avascular. These correlate with the fat density  masses seen mammographically and are consistent with benign fat  necrosis.       IMPRESSION:  No mammographic evidence of malignancy. New postoperative and  postradiation changes, left breast. Benign-appearing fat necrosis  correlating with the area of palpable concern.      BI-RADS CATEGORY:      BI-RADS Category:  2 Benign.  Recommendation:  Follow-up as recommended or needed.  Recommended Date:  1 Year.  Laterality:  Bilateral.         ASSESSMENT/PLAN:  54 y.o. female with left breast cancer s/p neoadjuvant chemotherapy followed by breast conserving surgery followed by radiation.  Doing well and breast continues healing.  She continues PT for breast lymphedema wears compression bra.  Radiation follow up in 12 mo.   Call with any questions or concerns.     Angelina Zambrano CNP  [phone]

## 2024-07-26 ENCOUNTER — TELEPHONE (OUTPATIENT)
Dept: PRIMARY CARE | Facility: CLINIC | Age: 54
End: 2024-07-26

## 2024-08-19 ENCOUNTER — OFFICE VISIT (OUTPATIENT)
Dept: DERMATOLOGY | Facility: HOSPITAL | Age: 54
End: 2024-08-19
Payer: MEDICARE

## 2024-08-19 DIAGNOSIS — L73.2 HIDRADENITIS SUPPURATIVA: Primary | ICD-10-CM

## 2024-08-19 PROCEDURE — 1036F TOBACCO NON-USER: CPT | Performed by: DERMATOLOGY

## 2024-08-19 PROCEDURE — 87077 CULTURE AEROBIC IDENTIFY: CPT | Performed by: DERMATOLOGY

## 2024-08-19 PROCEDURE — 99214 OFFICE O/P EST MOD 30 MIN: CPT | Performed by: DERMATOLOGY

## 2024-08-19 ASSESSMENT — DERMATOLOGY PATIENT ASSESSMENT
ARE YOU AN ORGAN TRANSPLANT RECIPIENT: NO
DO YOU HAVE IRREGULAR MENSTRUAL CYCLES: NO
DO YOU USE A TANNING BED: NO
HAVE YOU HAD OR DO YOU HAVE A STAPH INFECTION: NO
DO YOU HAVE ANY NEW OR CHANGING LESIONS: NO
ARE YOU TRYING TO GET PREGNANT: NO
HAVE YOU HAD OR DO YOU HAVE VASCULAR DISEASE: NO
ARE YOU ON BIRTH CONTROL: NO

## 2024-08-19 ASSESSMENT — DERMATOLOGY QUALITY OF LIFE (QOL) ASSESSMENT
ARE THERE EXCLUSIONS OR EXCEPTIONS FOR THE QUALITY OF LIFE ASSESSMENT: NO
RATE HOW BOTHERED YOU ARE BY EFFECTS OF YOUR SKIN PROBLEMS ON YOUR ACTIVITIES (EG, GOING OUT, ACCOMPLISHING WHAT YOU WANT, WORK ACTIVITIES OR YOUR RELATIONSHIPS WITH OTHERS): 1
RATE HOW BOTHERED YOU ARE BY SYMPTOMS OF YOUR SKIN PROBLEM (EG, ITCHING, STINGING BURNING, HURTING OR SKIN IRRITATION): 1
RATE HOW EMOTIONALLY BOTHERED YOU ARE BY YOUR SKIN PROBLEM (FOR EXAMPLE, WORRY, EMBARRASSMENT, FRUSTRATION): 1
DATE THE QUALITY-OF-LIFE ASSESSMENT WAS COMPLETED: 67071

## 2024-08-19 ASSESSMENT — ITCH NUMERIC RATING SCALE: HOW SEVERE IS YOUR ITCHING?: 0

## 2024-08-19 ASSESSMENT — PATIENT GLOBAL ASSESSMENT (PGA): PATIENT GLOBAL ASSESSMENT: PATIENT GLOBAL ASSESSMENT:  1 - CLEAR

## 2024-08-19 NOTE — PROGRESS NOTES
"Subjective     Sharonda Yeboah is a 54 y.o. female who presents for the following: Hidradenitis Suppurativa.     Last derm visit 1/30/24 for hidradenitis suppurativa. Since then there have been 4 missed appointments that had been scheduled for ILK    Prior treatments have included rifampin/clindamycin, could not tolerate the entire treatment course. Re-started minocycline, stopped working, switched to doxycycline 10/2023    For her breast cancer - Breast cancer Dx 2022, stage 3 locally advanced triple negative. Neoadjuvantimmunochemotherapy with pembrolizumab. Last mammo, no evidence of disease, screening annually.      She has been meeting with an orthopedic surgeon for knee replacement surgery.     Her hidradenitis suppurativa has been flaring. She has been taking minocycline twice per day since last visit      Review of Systems:  No other skin or systemic complaints other than what is documented elsewhere in the note.    The following portions of the chart were reviewed this encounter and updated as appropriate:   Tobacco  Allergies  Meds  Problems  Med Hx  Surg Hx         Skin Cancer History  No skin cancer on file.      Specialty Problems          Dermatology Problems    Hidradenitis suppurativa     Hidradenitis - Many years. Surgery R axilla. Doxy/Fabio \"keeps it at bay.\" Humira c/b psoriasiswithin 1 month, stopped.Crohn's disease - diagnosed/treated at [facility] but quiescent for years per patient report              Objective   Well appearing patient in no apparent distress; mood and affect are within normal limits.    A focused skin examination was performed. All findings within normal limits unless otherwise noted below.    Assessment/Plan   1. Hidradenitis suppurativa  Gluteal Crease, Left Axilla, Left Medial Thigh, Right Axilla, Right Medial Thigh  Deep sinus tract formation and numerous scars in these locations, approx 9 deep nodules today, marilou stage 3    -Discussed nature of condition  -Discussed " treatment options    Prior treatments have included rifampin/clindamycin, could not tolerate the entire treatment course. Re-started minocycline, stopped working, switched to doxycycline 10/2023    Trial of ILK 1/30/24, she did not think it caused improvement but did not come for follow up appts    Culture for bacteria today and change antibiotics pending results. Stay on antibiotics for the next 3 months through surgery and post-operative period and then follow up again to discuss other long-term strategies like isotretinoin. Could potentially consider spironolactone since her breast cancer was triple negative but would have to check with onc. She is not interested in humira, worried about risk of psoriasis          Specimen A - Tissue/Wound Culture/Smear      Related Procedures  Follow Up In Dermatology - Established Patient    Related Medications  benzoyl peroxide (Benzac AC) 10 % external wash  Apply topically once daily. As directed leave on for 5 min then rinse        Follow up 3 months for hidradenitis suppurativa     We did discuss that if she cannot make an appointment to please cancel or send the office a Hooked[employer] message. I understand that she has a lot of care giving and health responsibilities, but every missed appointment that isn't cancelled is a time slot that another patient cannot use. Discussed that if there are too many of these instances we may not be able to schedule follow ups and she said she understood.

## 2024-08-22 LAB
BACTERIA SPEC CULT: ABNORMAL
GRAM STN SPEC: ABNORMAL
GRAM STN SPEC: ABNORMAL

## 2024-08-22 RX ORDER — BENZOYL PEROXIDE 100 MG/ML
LIQUID TOPICAL DAILY
Qty: 227 G | Refills: 11 | Status: SHIPPED | OUTPATIENT
Start: 2024-08-22

## 2024-08-22 RX ORDER — DOXYCYCLINE 100 MG/1
100 CAPSULE ORAL 2 TIMES DAILY
Qty: 180 CAPSULE | Refills: 1 | Status: SHIPPED | OUTPATIENT
Start: 2024-08-22

## 2024-08-22 RX ORDER — CLINDAMYCIN PHOSPHATE 10 UG/ML
LOTION TOPICAL EVERY MORNING
Qty: 60 ML | Refills: 11 | Status: SHIPPED | OUTPATIENT
Start: 2024-08-22 | End: 2025-08-22

## 2024-09-10 ENCOUNTER — TREATMENT (OUTPATIENT)
Dept: PHYSICAL THERAPY | Facility: CLINIC | Age: 54
End: 2024-09-10
Payer: COMMERCIAL

## 2024-09-10 DIAGNOSIS — I97.2 LYMPHEDEMA SYNDROME, POSTMASTECTOMY: Primary | ICD-10-CM

## 2024-09-10 DIAGNOSIS — I97.2 POSTMASTECTOMY LYMPHEDEMA SYNDROME: ICD-10-CM

## 2024-09-10 PROCEDURE — 97140 MANUAL THERAPY 1/> REGIONS: CPT | Mod: GP

## 2024-09-10 ASSESSMENT — PAIN DESCRIPTION - DESCRIPTORS: DESCRIPTORS: TIGHTNESS

## 2024-09-10 ASSESSMENT — PAIN SCALES - GENERAL: PAINLEVEL_OUTOF10: 5 - MODERATE PAIN

## 2024-09-10 ASSESSMENT — PAIN - FUNCTIONAL ASSESSMENT: PAIN_FUNCTIONAL_ASSESSMENT: 0-10

## 2024-09-10 NOTE — PROGRESS NOTES
"Physical Therapy Treatment/Progress Note    Patient Name: Sharonda Yeboah  MRN: [id number]  Today's Date: 9/10/2024  Time Calculation  Start Time: 1000  Stop Time: 1100  Time Calculation (min): 60 min  PT Therapeutic Procedures Time Entry  Manual Therapy Time Entry: 55    Insurance:  Visit number: 2 of 10  Authorization info: pending  Insurance Type: Payor: [employer] [facility] / Plan: [employer] [place] / Product Type: *No Product type* /     Current Problem   1. Lymphedema syndrome, postmastectomy        2. Postmastectomy lymphedema syndrome  Follow Up In Physical Therapy          Subjective   Pt reports 5/10 pain in left arm; states she feels tightness going down arm with overhead reaching.  No changes in left breast; still has quite a bit of swelling.    Pt report she will be having brain surgery next week to \"clip my aneurysms\". Also scheduled for left TKA 9/25.     Precautions  Medical Precautions: Lymphedema precautions    Pain Assessment: 0-10  0-10 (Numeric) Pain Score: 5 - Moderate pain  Pain Location: Arm  Pain Orientation: Left, Proximal  Pain Descriptors: Tightness  Post Treatment Pain Level 0    Objective   Left breast soft, nonpitting edema with mild fibrosis underside of breast.  Peau d'orange skin texture  2 cords seen and palpated in left axilla    Treatments:  Manual:     --manual lymph drainage secondary left UE protocol with emphasis on truncal decongestion, parasternal lymph node stimulation. No abdominal sequence due to hernia; pt performed diaphragmatic breathing.     --STM, MFR to cording in left axilla    Assessment    PT Assessment  Assessment Comment: Pt responded well to manual therapy to reduce cording; decrease pain and tightness reported after session.  No change in left breast; still very edeamatous    Plan:   Cont per POC with MLD and manual therapy to reduce edema and alleviate cording.      OP EDUCATION:  Outpatient Education  Individual(s) Educated: Patient  Education " Provided: Lymphedema care    Goals:   Active       PT Problem       PT Goal 1 (Progressing)       Start:  11/03/23    Expected End:  09/30/24       Pt independent with lymphedema risk-reduction strategies         PT Goal 2 (Progressing)       Start:  11/03/23    Expected End:  09/30/24       Improve LLIS to no more than 15% impairment         PT Goal 3 (Progressing)       Start:  11/03/23    Expected End:  09/30/24       Pt independent with lymphedema self-care/self-management strategies         PT Goal 4 (Progressing)       Start:  11/03/23    Expected End:  09/30/24       Reduce frequency and intensity of pain in left breast by at least 60%

## 2024-09-13 ENCOUNTER — DOCUMENTATION (OUTPATIENT)
Dept: PHYSICAL THERAPY | Facility: CLINIC | Age: 54
End: 2024-09-13
Payer: MEDICARE

## 2024-09-13 ENCOUNTER — APPOINTMENT (OUTPATIENT)
Dept: PHYSICAL THERAPY | Facility: CLINIC | Age: 54
End: 2024-09-13
Payer: MEDICARE

## 2024-09-13 NOTE — PROGRESS NOTES
Physical Therapy                 Therapy Communication Note    Patient Name: Sharonda Yeboah  MRN: [id number]  Department:   Room: Room/bed info not found  Today's Date: 9/13/2024     Discipline: Physical Therapy    Missed Visit Reason:      Missed Time: Cancel    Comment:

## 2024-09-18 ENCOUNTER — APPOINTMENT (OUTPATIENT)
Dept: PHYSICAL THERAPY | Facility: CLINIC | Age: 54
End: 2024-09-18
Payer: MEDICARE

## 2024-09-20 ENCOUNTER — DOCUMENTATION (OUTPATIENT)
Dept: PHYSICAL THERAPY | Facility: CLINIC | Age: 54
End: 2024-09-20
Payer: MEDICARE

## 2024-09-20 ENCOUNTER — APPOINTMENT (OUTPATIENT)
Dept: PHYSICAL THERAPY | Facility: CLINIC | Age: 54
End: 2024-09-20
Payer: MEDICARE

## 2024-09-20 NOTE — PROGRESS NOTES
Physical Therapy                 Therapy Communication Note    Patient Name: Sharonda Yeboah  MRN: [id number]  Department:   Room: Room/bed info not found  Today's Date: 9/20/2024     Discipline: Physical Therapy    Missed Visit Reason:      Missed Time: Cancel    Comment:  Pt canceled today's and all remaining visits.  Recently had surgery for aneurysm and has TKA scheduled 9/25

## 2024-09-25 ENCOUNTER — APPOINTMENT (OUTPATIENT)
Dept: PHYSICAL THERAPY | Facility: CLINIC | Age: 54
End: 2024-09-25
Payer: MEDICARE

## 2024-09-27 ENCOUNTER — APPOINTMENT (OUTPATIENT)
Dept: PHYSICAL THERAPY | Facility: CLINIC | Age: 54
End: 2024-09-27
Payer: MEDICARE

## 2024-10-02 ENCOUNTER — APPOINTMENT (OUTPATIENT)
Dept: PHYSICAL THERAPY | Facility: CLINIC | Age: 54
End: 2024-10-02
Payer: MEDICARE

## 2024-10-04 ENCOUNTER — APPOINTMENT (OUTPATIENT)
Dept: PHYSICAL THERAPY | Facility: CLINIC | Age: 54
End: 2024-10-04
Payer: MEDICARE

## 2024-10-09 ENCOUNTER — APPOINTMENT (OUTPATIENT)
Dept: PHYSICAL THERAPY | Facility: CLINIC | Age: 54
End: 2024-10-09
Payer: MEDICARE

## 2024-10-10 ENCOUNTER — OFFICE VISIT (OUTPATIENT)
Dept: HEMATOLOGY/ONCOLOGY | Facility: HOSPITAL | Age: 54
End: 2024-10-10
Payer: MEDICARE

## 2024-10-10 ENCOUNTER — HOSPITAL ENCOUNTER (OUTPATIENT)
Dept: RADIOLOGY | Facility: HOSPITAL | Age: 54
Discharge: HOME | End: 2024-10-10
Payer: MEDICARE

## 2024-10-10 VITALS
HEART RATE: 72 BPM | RESPIRATION RATE: 20 BRPM | HEIGHT: 63 IN | WEIGHT: 193.56 LBS | SYSTOLIC BLOOD PRESSURE: 117 MMHG | BODY MASS INDEX: 34.3 KG/M2 | OXYGEN SATURATION: 99 % | DIASTOLIC BLOOD PRESSURE: 76 MMHG | TEMPERATURE: 97.3 F

## 2024-10-10 VITALS — HEIGHT: 63 IN | WEIGHT: 186 LBS | BODY MASS INDEX: 32.96 KG/M2

## 2024-10-10 DIAGNOSIS — C50.912 BREAST CANCER METASTASIZED TO AXILLARY LYMPH NODE, LEFT (MULTI): ICD-10-CM

## 2024-10-10 DIAGNOSIS — Z12.31 ENCOUNTER FOR SCREENING MAMMOGRAM FOR MALIGNANT NEOPLASM OF BREAST: ICD-10-CM

## 2024-10-10 DIAGNOSIS — I97.2 LYMPHEDEMA SYNDROME, POSTMASTECTOMY: Primary | ICD-10-CM

## 2024-10-10 DIAGNOSIS — C77.3 BREAST CANCER METASTASIZED TO AXILLARY LYMPH NODE, LEFT (MULTI): ICD-10-CM

## 2024-10-10 PROCEDURE — 77067 SCR MAMMO BI INCL CAD: CPT

## 2024-10-10 PROCEDURE — 99214 OFFICE O/P EST MOD 30 MIN: CPT

## 2024-10-10 PROCEDURE — 1036F TOBACCO NON-USER: CPT

## 2024-10-10 PROCEDURE — 3008F BODY MASS INDEX DOCD: CPT

## 2024-10-10 RX ORDER — MELOXICAM 15 MG/1
15 TABLET ORAL DAILY
COMMUNITY

## 2024-10-10 ASSESSMENT — PAIN SCALES - GENERAL: PAINLEVEL: 6

## 2024-10-10 ASSESSMENT — PATIENT HEALTH QUESTIONNAIRE - PHQ9
1. LITTLE INTEREST OR PLEASURE IN DOING THINGS: NOT AT ALL
SUM OF ALL RESPONSES TO PHQ9 QUESTIONS 1 & 2: 0
2. FEELING DOWN, DEPRESSED OR HOPELESS: NOT AT ALL

## 2024-10-10 NOTE — PROGRESS NOTES
DIVISION OF MEDICAL ONCOLOGY    Patient ID: Sharonda Yeboah is a 54 y.o. female.  MRN: [id number]  : 1970  The patient presents to clinic today for her history of left-sided breast cancer.    Diagnostic/Therapeutic History:  -21: Bilateral screening mammogram was negative.  -Palpated left axillary lump. Saw PCP.  -10/5/22: Diagnostic mammogram/US showed a 3.6 x 3.0 x 3.4 cm irregular bilobed mass at 2:00, 7 cm FN. Four abnormal left axillary LN’s seen. In the right axilla, a 4.1 cm superficial fatty mass noted, likely a lipoma.  -10/12/22: US-guided CNB confirmed IDC, grade 3, ER/WA-negative Her2-negative (1+ IHC). A left axillary LN was positive for metastatic carcinoma.  -10/19/22: Breast MRI confirmed a 4.4 cm irregular mass with central necrosis in the left breast. Two bulky level 1 axillary LN’s seen (no level 2/3 LN’s identified). A peripheral rim-enhancing mass seen in left internal mammary region,  measuring 0.9 x 0.8 cm, concerning for malignant lymph node. Left axillary skin thickening seen.  -Neoadjuvant chemotherapy with [id number]-522 regimen, 22-23. She required dose-reductions for SBO's that were managed conservatively.  -23: Left lumpectomy/SLNBx performed. pathology: pCR to therapy.  -Received 3 doses of adjuvant pembrolizumab, but decided to stop for abdominal symptoms and diarrhea.  -23 - 23: Radiation therapy to the left breast, SCV consisting of a dose of 42.56 Gy with additional 10 Gy to the tumor bed for a total of 52.56 Gy.     History of Present Illness (HPI)/Interval History:  Sharonda Yeboah presents today for routine FUV.  Overall, she has been feeling well since her last appointment.  Ongoing left breast edema and discomfort noted, improved with PT. However would like to go back to PT.   Notes some fatigue, but able to perform her usual activities.  She is off Eliquis. No edema or dyspnea/cough.  She underwent a left knee replacement in 2024.   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     Review of Systems:  14-point ROS otherwise negative, as per HPI/Interval History.    Past Medical History:   Diagnosis Date    Abnormal levels of other serum enzymes 04/07/2020    Elevated alkaline phosphatase level    Acute candidiasis of vulva and vagina 12/23/2019    Vaginal candidiasis    Breast cancer (Multi) 05/01/2023    left lumpectomy w/rad & chemo    Change in bowel habit 07/19/2017    Encounter for diagnostic colonoscopy due to change in bowel habits    Contact with and (suspected) exposure to potentially hazardous body fluids 07/25/2017    History of exposure to hazardous bodily fluids    Dorsalgia, unspecified 06/17/2015    Acute back pain    Encounter for follow-up examination after completed treatment for conditions other than malignant neoplasm 02/17/2018    Hospital discharge follow-up    Encounter for screening for respiratory tuberculosis 07/25/2017    Encounter for screening for respiratory tuberculosis    Endometriosis, unspecified 10/18/2022    Endometriosis    Fever, unspecified 12/28/2021    Fever and chills    Functional intestinal disorder, unspecified 10/18/2022    Functional bowel disorder    Generalized abdominal pain 06/06/2017    Generalized abdominal pain    Hx antineoplastic chemo     Low back pain, unspecified 10/18/2022    Lumbar pain    Lymphedema syndrome, postmastectomy 11/03/2023    Other conditions influencing health status     Arthritis    Other conditions influencing health status     History of dyspareunia    Other specified cough 09/25/2019    Productive cough    Pain in left knee 12/29/2016    Left knee pain    Personal history of diseases of the blood and blood-forming organs and certain disorders involving the immune mechanism 07/25/2017    History of anemia    Personal history of diseases of the blood and blood-forming organs and certain disorders involving  the immune mechanism 07/25/2017    History of anemia    Personal history of diseases of the skin and subcutaneous tissue 06/06/2017    History of eczema    Personal history of irradiation     Personal history of other diseases of the digestive system 07/19/2017    History of constipation    Personal history of other diseases of the digestive system     History of esophageal reflux    Personal history of other diseases of the musculoskeletal system and connective tissue 02/16/2017    History of neck pain    Personal history of other diseases of the musculoskeletal system and connective tissue 01/19/2017    History of back pain    Personal history of other diseases of the nervous system and sense organs     History of migraine headaches    Personal history of other diseases of the respiratory system 12/31/2020    History of acute bacterial sinusitis    Personal history of other diseases of the respiratory system 01/21/2021    History of sore throat    Personal history of other diseases of the respiratory system 06/04/2019    History of acute bronchitis    Personal history of other mental and behavioral disorders 10/18/2022    History of depression    Personal history of other specified conditions 06/06/2017    History of exertional chest pain    Personal history of other specified conditions 08/02/2017    History of abdominal pain    Personal history of other specified conditions 10/23/2020    History of fatigue    Personal history of other specified conditions 07/25/2017    History of fatigue    Personal history of other specified conditions 02/17/2018    History of diarrhea    Plantar fascial fibromatosis 03/22/2014    Plantar fasciitis, right    Sprain of unspecified rotator cuff capsule, initial encounter 10/18/2022    Rotator cuff (capsule) sprain    Tobacco abuse counseling 07/25/2017    Encounter for tobacco use cessation counseling     Patient Active Problem List   Diagnosis    Breast cancer metastasized to  axillary lymph node, left (Multi)    Carpal tunnel syndrome, right    Cervical spinal stenosis    Goiter    Hidradenitis suppurativa    Mass of axillary tail of left breast    Uterine fibroid    Vasomotor symptoms due to menopause    Vitamin D deficiency    Medicare annual wellness visit, subsequent    Acute deep vein thrombosis (DVT) of distal vein of left lower extremity (Multi)    Status post partial mastectomy of left breast    S/P radiation therapy    S/P chemotherapy, time since greater than 12 weeks    Lymphedema syndrome, postmastectomy        Past Surgical History:   Procedure Laterality Date    BREAST LUMPECTOMY Left 2023    left lumpectomy w/rad & chemo     SECTION, CLASSIC  10/18/2022     Section    CHOLECYSTECTOMY  2013    Cholecystectomy    CT ABDOMEN PELVIS ANGIOGRAM W AND/OR WO IV CONTRAST  2023    CT ABDOMEN PELVIS ANGIOGRAM W AND/OR WO IV CONTRAST [facility] CT    HERNIA REPAIR  10/18/2022    Hernia Repair    HYSTEROSCOPY  2014    Hysteroscopy With Endometrial Ablation    OTHER SURGICAL HISTORY  2013    Laparoscopic Appendectomy Incidental    OTHER SURGICAL HISTORY  2013    Craniotomy (Therapeutic)    OTHER SURGICAL HISTORY  2013    Arthrotomy Of Knee With Lateral Meniscectomy    OTHER SURGICAL HISTORY  10/18/2022    Intracranial Aneurysm Repair    ROTATOR CUFF REPAIR  10/18/2022    Rotator Cuff Repair    SHOULDER SURGERY  2014    Shoulder Surgery Right    TUBAL LIGATION  10/18/2022    Tubal Ligation    UMBILICAL HERNIA REPAIR  2013    Umbilical Hernia Repair       Social History     Socioeconomic History    Marital status: Single    Number of children: 4   Tobacco Use    Smoking status: Former     Current packs/day: 0.00     Types: Cigarettes     Start date: 10/1/2017     Quit date: 10/1/2022     Years since quittin.0    Smokeless tobacco: Never   Substance and Sexual Activity    Alcohol use: Not Currently    Drug use: Never     Sexual activity: Not Currently     Birth control/protection: Post-menopausal     Social Determinants of Health     Financial Resource Strain: Low Risk  (12/17/2023)    Received from [facility]    Overall Financial Resource Strain (CARDIA)     Difficulty of Paying Living Expenses: Not hard at all   Food Insecurity: Food Insecurity Present (9/23/2024)    Received from [facility]    Hunger Vital Sign     Worried About Running Out of Food in the Last Year: Never true     Ran Out of Food in the Last Year: Sometimes true   Transportation Needs: No Transportation Needs (12/17/2023)    Received from [facility]    PRAPARE - Transportation     Lack of Transportation (Medical): No     Lack of Transportation (Non-Medical): No   Physical Activity: Sufficiently Active (3/8/2024)    Received from [facility]    Exercise Vital Sign     Days of Exercise per Week: 4 days     Minutes of Exercise per Session: 60 min   Stress: No Stress Concern Present (3/8/2024)    Received from [facility]    [employer] [place] [employer] - Occupational Stress Questionnaire     Feeling of Stress : Only a little   Social Connections: Moderately Isolated (3/8/2024)    Received from [facility]    Social Connection and Isolation Panel [NHANES]     Frequency of Communication with Friends and Family: More than three times a week     Frequency of Social Gatherings with Friends and Family: Once a week     Attends Voodoo Services: More than 4 times per year     Active Member of Clubs or Organizations: No     Attends Club or Organization Meetings: Never     Marital Status: Never    Housing Stability: Low Risk  (12/17/2023)    Received from [facility]    Housing Stability Vital Sign     Unable to Pay for Housing in the Last Year: No     Number of Places Lived in the Last Year: 1     Unstable  Housing in the Last Year: No       Family History   Problem Relation Name Age of Onset    Hypertension Mother      Diabetes type II Mother         Allergies:   No Known Allergies      Current Outpatient Medications:     benzoyl peroxide (Benzac AC) 10 % external wash, Apply topically once daily. As directed leave on for 5 min then rinse, Disp: 227 g, Rfl: 11    calcium carbonate (Oscal) 500 mg calcium (1,250 mg) tablet, Take 1 tablet (1,250 mg) by mouth 2 times daily (morning and late afternoon)., Disp: , Rfl:     clindamycin (Cleocin T) 1 % lotion, Apply topically once daily in the morning. To sores or bumps, Disp: 60 mL, Rfl: 11    doxycycline (Vibramycin) 100 mg capsule, Take 1 capsule (100 mg) by mouth 2 times a day. Take with food and at least 8 ounces (large glass) of water, do not lie down for 30 minutes after; use sun protection, Disp: 180 capsule, Rfl: 1    GENERIC EXTERNAL MEDICATION, Take 1 capsule by mouth once daily. Potassium, Disp: , Rfl:     minocycline 100 mg capsule, TAKE 1 CAPSULE BY MOUTH TWICE DAILY WITH FOOD AND A GLASS OF WATER, Disp: , Rfl:     multivitamin tablet, Take 1 tablet by mouth once daily., Disp: , Rfl:     omeprazole (PriLOSEC) 40 mg DR capsule, Take 1 capsule (40 mg) by mouth once daily., Disp: , Rfl:      Objective    BSA: There is no height or weight on file to calculate BSA.  There were no vitals taken for this visit.    ECOG Performance Status:  The ECOG performance scale today is ECO- Restricted in physically strenuous activity.  Carries out light duty.    Physical Exam  Vitals reviewed.   Constitutional:       General: She is not in acute distress.     Appearance: Normal appearance. She is not ill-appearing or diaphoretic.   HENT:      Head: Normocephalic and atraumatic.   Eyes:      General: No scleral icterus.  Cardiovascular:      Rate and Rhythm: Normal rate and regular rhythm.      Heart sounds: No murmur heard.  Pulmonary:      Effort: Pulmonary effort is normal.  No respiratory distress.      Breath sounds: Normal breath sounds. No wheezing.   Chest:   Breasts:     Left: Skin change and tenderness present. No bleeding or mass.      Comments: Left breast lymphedema noted, improved from last appointment.  Musculoskeletal:      Cervical back: Normal range of motion and neck supple. No tenderness.   Lymphadenopathy:      Cervical: No cervical adenopathy.      Upper Body:      Right upper body: No supraclavicular adenopathy.      Left upper body: No supraclavicular or axillary adenopathy.   Skin:     General: Skin is warm and dry.      Coloration: Skin is not jaundiced.      Findings: No rash.      Comments: Bilateral axillary hidradenitis suppurativa.    Neurological:      General: No focal deficit present.      Mental Status: She is alert and oriented to person, place, and time. Mental status is at baseline.      Gait: Gait normal.   Psychiatric:         Mood and Affect: Mood normal.         Behavior: Behavior normal.         Laboratory Data:  Lab Results   Component Value Date    WBC 5.2 04/04/2024    HGB 11.6 (L) 04/04/2024    HCT 35.7 (L) 04/04/2024    MCV 85 04/04/2024     04/04/2024    ANC 2.87 08/04/2023       Chemistry    Lab Results   Component Value Date/Time     04/04/2024 1154    K 4.0 04/04/2024 1154     04/04/2024 1154    CO2 23 04/04/2024 1154    BUN 14 04/04/2024 1154    CREATININE 0.76 04/04/2024 1154    Lab Results   Component Value Date/Time    CALCIUM 8.8 04/04/2024 1154    ALKPHOS 89 04/04/2024 1154    AST 16 04/04/2024 1154    ALT 16 04/04/2024 1154    BILITOT 0.4 04/04/2024 1154        Radiology:  === 02/06/24 ===    CT ANGIO CHEST FOR PULMONARY EMBOLISM    - Impression -  1. No evidence of acute pulmonary embolism.  2. Chronic radiation changes and traction bronchiectasis of the left  upper lobe.  3. Postsurgical changes in the left breast status post  lumpectomy/left axillary lymph node dissection as described above.  There is  nonspecific soft tissue thickening/stranding/edema involving  the deep tissues of the visualized portions of the left breast and  chest wall. Findings are favored to represent postradiation changes.  However recommend clinical with physical exam if clinical concern for  mastitis. Also possibility of locoregional disease/tumor recurrence  cannot be excluded. Correlate clinically and consider correlate with  dedicated breast imaging.  4. Stable 2 mm calcified granuloma involving the left upper lobe as  described above.    I personally reviewed the images/study and I agree with the findings  as stated by resident Levi Barriga this study was interpreted at  [place], [place].    MACRO:  None    Signed by: Elizabeth Ramos 2/6/2024 2:31 PM  Dictation workstation:   [id number]      Assessment/Plan:  Sharonda Yeboah is a 54 y.o. female with a history of left-sided triple-negative breast cancer, who presents today for follow-up evaluation on observation.     1. Left breast cancer. Stage IIIC (cT2-3 cN3b cM0), grade 3 IDC, triple-negative.    S/p neoadjuvant chemoimmunotherapy with pCR obtained. S/p lumpectomy and adjuvant RT.  - No concerning symptoms or exam findings reported today.  - Ongoing left breast lymphedema noted. Following with PT, slightly improved.  - Mammogram 10/10/2024: CAT 2, continue yearly screenings     2. Hidradenitis suppurativa. Diagnosed in late-teens. S/p multiple lines of therapy, including axillary surgery.  - Following with Dr. Jamia Carter from Dermatology. On minocycline.     3. H/o recurrent SBO. S/p multiple hernia repair surgeries and prior bowel resection for SBO.  - Last admitted 12/2023, managed conservatively. Currently no symptoms.     4. LLE DVT. Provoked in the setting of chemotherapy and prior hospitalization.  US done on 7/7/23 showed stable chronic DVT.  - FU Dopplers after 3 months of anticoagulation showed chronic  thrombus.  - Repeat Dopplers 1/2024 negative. D-dimer normal. Eliquis stopped 1/2024.  - No evidence of recurrent DVTs today     Disposition.  - RTC 6 months with Celina Nieves PA-C   - She has our contact information and was instructed to call with concerns/questions in the interim.    No orders of the defined types were placed in this encounter.     Celina Nieves PA-C  Hematology and Medical Oncology  [facility]

## 2024-10-11 ENCOUNTER — APPOINTMENT (OUTPATIENT)
Dept: PHYSICAL THERAPY | Facility: CLINIC | Age: 54
End: 2024-10-11
Payer: MEDICARE

## 2024-10-28 ENCOUNTER — APPOINTMENT (OUTPATIENT)
Dept: DERMATOLOGY | Facility: HOSPITAL | Age: 54
End: 2024-10-28
Payer: MEDICARE

## 2024-10-30 ENCOUNTER — APPOINTMENT (OUTPATIENT)
Dept: PHYSICAL THERAPY | Facility: CLINIC | Age: 54
End: 2024-10-30
Payer: COMMERCIAL

## 2024-10-30 ENCOUNTER — APPOINTMENT (OUTPATIENT)
Dept: PRIMARY CARE | Facility: CLINIC | Age: 54
End: 2024-10-30
Payer: MEDICARE

## 2024-10-30 VITALS
HEART RATE: 78 BPM | BODY MASS INDEX: 34.55 KG/M2 | OXYGEN SATURATION: 98 % | SYSTOLIC BLOOD PRESSURE: 133 MMHG | DIASTOLIC BLOOD PRESSURE: 85 MMHG | HEIGHT: 63 IN | WEIGHT: 195 LBS

## 2024-10-30 DIAGNOSIS — E78.2 MODERATE MIXED HYPERLIPIDEMIA NOT REQUIRING STATIN THERAPY: ICD-10-CM

## 2024-10-30 DIAGNOSIS — E66.811 CLASS 1 OBESITY DUE TO EXCESS CALORIES WITHOUT SERIOUS COMORBIDITY WITH BODY MASS INDEX (BMI) OF 34.0 TO 34.9 IN ADULT: ICD-10-CM

## 2024-10-30 DIAGNOSIS — E66.09 CLASS 1 OBESITY DUE TO EXCESS CALORIES WITHOUT SERIOUS COMORBIDITY WITH BODY MASS INDEX (BMI) OF 34.0 TO 34.9 IN ADULT: ICD-10-CM

## 2024-10-30 DIAGNOSIS — E55.9 VITAMIN D DEFICIENCY: ICD-10-CM

## 2024-10-30 DIAGNOSIS — Z12.31 ENCOUNTER FOR SCREENING MAMMOGRAM FOR BREAST CANCER: ICD-10-CM

## 2024-10-30 DIAGNOSIS — R73.9 ELEVATED BLOOD SUGAR: ICD-10-CM

## 2024-10-30 DIAGNOSIS — L73.2 HIDRADENITIS SUPPURATIVA: ICD-10-CM

## 2024-10-30 DIAGNOSIS — Z23 NEED FOR VACCINATION: ICD-10-CM

## 2024-10-30 DIAGNOSIS — R94.6 ABNORMAL THYROID EXAM: ICD-10-CM

## 2024-10-30 DIAGNOSIS — Z13.6 SCREENING FOR CARDIOVASCULAR CONDITION: ICD-10-CM

## 2024-10-30 DIAGNOSIS — Z00.00 MEDICARE ANNUAL WELLNESS VISIT, SUBSEQUENT: Primary | ICD-10-CM

## 2024-10-30 PROBLEM — Z86.718 HX OF DEEP VENOUS THROMBOSIS: Status: ACTIVE | Noted: 2024-10-30

## 2024-10-30 PROBLEM — N63.32 MASS OF AXILLARY TAIL OF LEFT BREAST: Status: RESOLVED | Noted: 2023-04-12 | Resolved: 2024-10-30

## 2024-10-30 PROBLEM — I82.4Z2 ACUTE DEEP VEIN THROMBOSIS (DVT) OF DISTAL VEIN OF LEFT LOWER EXTREMITY (MULTI): Status: RESOLVED | Noted: 2023-10-16 | Resolved: 2024-10-30

## 2024-10-30 PROCEDURE — G0439 PPPS, SUBSEQ VISIT: HCPCS | Performed by: FAMILY MEDICINE

## 2024-10-30 PROCEDURE — 90656 IIV3 VACC NO PRSV 0.5 ML IM: CPT | Performed by: FAMILY MEDICINE

## 2024-10-30 PROCEDURE — G0008 ADMIN INFLUENZA VIRUS VAC: HCPCS | Performed by: FAMILY MEDICINE

## 2024-10-30 PROCEDURE — 99213 OFFICE O/P EST LOW 20 MIN: CPT | Performed by: FAMILY MEDICINE

## 2024-10-30 PROCEDURE — 1036F TOBACCO NON-USER: CPT | Performed by: FAMILY MEDICINE

## 2024-10-30 PROCEDURE — 99396 PREV VISIT EST AGE 40-64: CPT | Performed by: FAMILY MEDICINE

## 2024-10-30 PROCEDURE — 3008F BODY MASS INDEX DOCD: CPT | Performed by: FAMILY MEDICINE

## 2024-10-30 ASSESSMENT — COLUMBIA-SUICIDE SEVERITY RATING SCALE - C-SSRS
1. IN THE PAST MONTH, HAVE YOU WISHED YOU WERE DEAD OR WISHED YOU COULD GO TO SLEEP AND NOT WAKE UP?: NO
2. HAVE YOU ACTUALLY HAD ANY THOUGHTS OF KILLING YOURSELF?: NO

## 2024-10-30 ASSESSMENT — PATIENT HEALTH QUESTIONNAIRE - PHQ9
2. FEELING DOWN, DEPRESSED OR HOPELESS: NOT AT ALL
1. LITTLE INTEREST OR PLEASURE IN DOING THINGS: NOT AT ALL
SUM OF ALL RESPONSES TO PHQ9 QUESTIONS 1 AND 2: 0

## 2024-10-30 ASSESSMENT — ACTIVITIES OF DAILY LIVING (ADL)
MANAGING_FINANCES: INDEPENDENT
DOING_HOUSEWORK: INDEPENDENT
GROCERY_SHOPPING: INDEPENDENT
BATHING: INDEPENDENT
DRESSING: INDEPENDENT
TAKING_MEDICATION: INDEPENDENT

## 2024-11-07 ENCOUNTER — LAB (OUTPATIENT)
Dept: LAB | Facility: LAB | Age: 54
End: 2024-11-07
Payer: MEDICARE

## 2024-11-08 ENCOUNTER — LAB (OUTPATIENT)
Dept: LAB | Facility: LAB | Age: 54
End: 2024-11-08
Payer: MEDICARE

## 2024-11-08 DIAGNOSIS — E78.2 MODERATE MIXED HYPERLIPIDEMIA NOT REQUIRING STATIN THERAPY: ICD-10-CM

## 2024-11-08 DIAGNOSIS — R73.9 ELEVATED BLOOD SUGAR: ICD-10-CM

## 2024-11-08 DIAGNOSIS — E55.9 VITAMIN D DEFICIENCY: ICD-10-CM

## 2024-11-08 DIAGNOSIS — R94.6 ABNORMAL THYROID EXAM: ICD-10-CM

## 2024-11-08 LAB
25(OH)D3 SERPL-MCNC: 30 NG/ML (ref 30–100)
ALBUMIN SERPL BCP-MCNC: 3.7 G/DL (ref 3.4–5)
ALP SERPL-CCNC: 105 U/L (ref 33–110)
ALT SERPL W P-5'-P-CCNC: 8 U/L (ref 7–45)
ANION GAP SERPL CALCULATED.3IONS-SCNC: 10 MMOL/L (ref 10–20)
AST SERPL W P-5'-P-CCNC: 12 U/L (ref 9–39)
BILIRUB SERPL-MCNC: 0.5 MG/DL (ref 0–1.2)
BUN SERPL-MCNC: 9 MG/DL (ref 6–23)
CALCIUM SERPL-MCNC: 10.1 MG/DL (ref 8.6–10.3)
CHLORIDE SERPL-SCNC: 104 MMOL/L (ref 98–107)
CHOLEST SERPL-MCNC: 196 MG/DL (ref 0–199)
CHOLEST/HDLC SERPL: 3.3 {RATIO}
CO2 SERPL-SCNC: 29 MMOL/L (ref 21–32)
CREAT SERPL-MCNC: 0.82 MG/DL (ref 0.5–1.05)
EGFRCR SERPLBLD CKD-EPI 2021: 84 ML/MIN/1.73M*2
ERYTHROCYTE [DISTWIDTH] IN BLOOD BY AUTOMATED COUNT: 15.4 % (ref 11.5–14.5)
EST. AVERAGE GLUCOSE BLD GHB EST-MCNC: 105 MG/DL
GLUCOSE SERPL-MCNC: 109 MG/DL (ref 74–99)
HBA1C MFR BLD: 5.3 %
HCT VFR BLD AUTO: 35.2 % (ref 36–46)
HDLC SERPL-MCNC: 59.3 MG/DL
HGB BLD-MCNC: 11.1 G/DL (ref 12–16)
LDLC SERPL CALC-MCNC: 114 MG/DL
MCH RBC QN AUTO: 27 PG (ref 26–34)
MCHC RBC AUTO-ENTMCNC: 31.5 G/DL (ref 32–36)
MCV RBC AUTO: 86 FL (ref 80–100)
NON HDL CHOLESTEROL: 137 MG/DL (ref 0–149)
NRBC BLD-RTO: 0 /100 WBCS (ref 0–0)
PLATELET # BLD AUTO: 325 X10*3/UL (ref 150–450)
POTASSIUM SERPL-SCNC: 4.6 MMOL/L (ref 3.5–5.3)
PROT SERPL-MCNC: 6.8 G/DL (ref 6.4–8.2)
RBC # BLD AUTO: 4.11 X10*6/UL (ref 4–5.2)
SODIUM SERPL-SCNC: 138 MMOL/L (ref 136–145)
TRIGL SERPL-MCNC: 114 MG/DL (ref 0–149)
TSH SERPL-ACNC: 2.52 MIU/L (ref 0.44–3.98)
VIT B12 SERPL-MCNC: 285 PG/ML (ref 211–911)
VLDL: 23 MG/DL (ref 0–40)
WBC # BLD AUTO: 6.1 X10*3/UL (ref 4.4–11.3)

## 2024-11-08 PROCEDURE — 82607 VITAMIN B-12: CPT

## 2024-11-08 PROCEDURE — 80053 COMPREHEN METABOLIC PANEL: CPT

## 2024-11-08 PROCEDURE — 85027 COMPLETE CBC AUTOMATED: CPT

## 2024-11-08 PROCEDURE — 80061 LIPID PANEL: CPT

## 2024-11-08 PROCEDURE — 82306 VITAMIN D 25 HYDROXY: CPT

## 2024-11-08 PROCEDURE — 83036 HEMOGLOBIN GLYCOSYLATED A1C: CPT

## 2024-11-08 PROCEDURE — 36415 COLL VENOUS BLD VENIPUNCTURE: CPT

## 2024-11-08 PROCEDURE — 84443 ASSAY THYROID STIM HORMONE: CPT

## 2024-11-21 ENCOUNTER — TELEPHONE (OUTPATIENT)
Dept: PRIMARY CARE | Facility: CLINIC | Age: 54
End: 2024-11-21
Payer: MEDICARE

## 2024-11-22 ENCOUNTER — TELEPHONE (OUTPATIENT)
Dept: HEMATOLOGY/ONCOLOGY | Facility: HOSPITAL | Age: 54
End: 2024-11-22

## 2024-11-22 ENCOUNTER — TELEPHONE (OUTPATIENT)
Dept: ADMISSION | Facility: HOSPITAL | Age: 54
End: 2024-11-22
Payer: MEDICARE

## 2024-11-22 DIAGNOSIS — I82.90 VTE (VENOUS THROMBOEMBOLISM): ICD-10-CM

## 2024-11-22 DIAGNOSIS — C77.3 BREAST CANCER METASTASIZED TO AXILLARY LYMPH NODE, LEFT (MULTI): Primary | ICD-10-CM

## 2024-11-22 DIAGNOSIS — C50.912 BREAST CANCER METASTASIZED TO AXILLARY LYMPH NODE, LEFT (MULTI): Primary | ICD-10-CM

## 2024-11-22 NOTE — TELEPHONE ENCOUNTER
Celina Baptiste is out. According to her last office note the patient should be seen around April 10th.

## 2024-11-22 NOTE — TELEPHONE ENCOUNTER
Patient called to notify team that she was diagnosed with DVT on Wednesday 11/20/204 in left upper thigh.  She had full knee replacement 9/25/24.  Patient started Eliquis yesterday.     She also does not have FUV scheduled.  Last FUV 10/10/24 - per note to follow up in 6 months.  No scheduling orders entered.

## 2024-11-22 NOTE — TELEPHONE ENCOUNTER
RN called patient. Advised scheduling order placed for 5/2025 - she refused to be transferred to scheduling line now.  She will call to schedule at another time.    We did review following up hematology.  She will either see a hematologist at  or [facility].  She states [facility] did her knee replacement so she may stay there for DVT management.  Sharonda is aware that she will need to follow up with hematology.      She denies additional questions/concerns at this time.

## 2024-11-25 ENCOUNTER — TELEPHONE (OUTPATIENT)
Dept: PRIMARY CARE | Facility: CLINIC | Age: 54
End: 2024-11-25
Payer: MEDICARE

## 2024-11-25 DIAGNOSIS — R19.7 ACUTE DIARRHEA: Primary | ICD-10-CM

## 2024-11-26 ENCOUNTER — TELEPHONE (OUTPATIENT)
Dept: PRIMARY CARE | Facility: CLINIC | Age: 54
End: 2024-11-26
Payer: MEDICARE

## 2024-12-09 ENCOUNTER — OFFICE VISIT (OUTPATIENT)
Dept: DERMATOLOGY | Facility: HOSPITAL | Age: 54
End: 2024-12-09
Payer: MEDICARE

## 2024-12-09 DIAGNOSIS — L30.0 NUMMULAR DERMATITIS: ICD-10-CM

## 2024-12-09 DIAGNOSIS — L73.2 HIDRADENITIS SUPPURATIVA: Primary | ICD-10-CM

## 2024-12-09 PROCEDURE — 99214 OFFICE O/P EST MOD 30 MIN: CPT | Performed by: DERMATOLOGY

## 2024-12-09 PROCEDURE — 1036F TOBACCO NON-USER: CPT | Performed by: DERMATOLOGY

## 2024-12-09 PROCEDURE — 11900 INJECT SKIN LESIONS </W 7: CPT | Performed by: DERMATOLOGY

## 2024-12-09 PROCEDURE — 2500000004 HC RX 250 GENERAL PHARMACY W/ HCPCS (ALT 636 FOR OP/ED): Performed by: DERMATOLOGY

## 2024-12-09 RX ORDER — TRIAMCINOLONE ACETONIDE 40 MG/ML
40 INJECTION, SUSPENSION INTRA-ARTICULAR; INTRAMUSCULAR ONCE
Status: COMPLETED | OUTPATIENT
Start: 2024-12-09 | End: 2024-12-09

## 2024-12-09 RX ORDER — CLOBETASOL PROPIONATE 0.5 MG/G
OINTMENT TOPICAL 2 TIMES DAILY
Qty: 60 G | Refills: 1 | Status: SHIPPED | OUTPATIENT
Start: 2024-12-09

## 2024-12-09 RX ORDER — SECUKINUMAB 150 MG/ML
INJECTION SUBCUTANEOUS
Qty: 2 ML | Refills: 5 | Status: SHIPPED | OUTPATIENT
Start: 2024-12-09

## 2024-12-09 RX ORDER — SECUKINUMAB 150 MG/ML
INJECTION SUBCUTANEOUS
Qty: 10 ML | Refills: 5 | Status: SHIPPED | OUTPATIENT
Start: 2024-12-09

## 2024-12-09 ASSESSMENT — DERMATOLOGY PATIENT ASSESSMENT
DO YOU HAVE ANY NEW OR CHANGING LESIONS: YES
DO YOU HAVE IRREGULAR MENSTRUAL CYCLES: NO
ARE YOU AN ORGAN TRANSPLANT RECIPIENT: NO
DO YOU USE A TANNING BED: NO
ARE YOU ON BIRTH CONTROL: NO
HAVE YOU HAD OR DO YOU HAVE VASCULAR DISEASE: NO
HAVE YOU HAD OR DO YOU HAVE A STAPH INFECTION: NO
ARE YOU TRYING TO GET PREGNANT: NO
DO YOU USE SUNSCREEN: OCCASIONALLY

## 2024-12-09 ASSESSMENT — ITCH NUMERIC RATING SCALE: HOW SEVERE IS YOUR ITCHING?: 0

## 2024-12-09 ASSESSMENT — DERMATOLOGY QUALITY OF LIFE (QOL) ASSESSMENT
RATE HOW BOTHERED YOU ARE BY EFFECTS OF YOUR SKIN PROBLEMS ON YOUR ACTIVITIES (EG, GOING OUT, ACCOMPLISHING WHAT YOU WANT, WORK ACTIVITIES OR YOUR RELATIONSHIPS WITH OTHERS): 0 - NEVER BOTHERED
WHAT SINGLE SKIN CONDITION LISTED BELOW IS THE PATIENT ANSWERING THE QUALITY-OF-LIFE ASSESSMENT QUESTIONS ABOUT: HIDRADENITIS SUPPURATIVA
ARE THERE EXCLUSIONS OR EXCEPTIONS FOR THE QUALITY OF LIFE ASSESSMENT: NO
RATE HOW EMOTIONALLY BOTHERED YOU ARE BY YOUR SKIN PROBLEM (FOR EXAMPLE, WORRY, EMBARRASSMENT, FRUSTRATION): 0 - NEVER BOTHERED
RATE HOW BOTHERED YOU ARE BY SYMPTOMS OF YOUR SKIN PROBLEM (EG, ITCHING, STINGING BURNING, HURTING OR SKIN IRRITATION): 0 - NEVER BOTHERED
DATE THE QUALITY-OF-LIFE ASSESSMENT WAS COMPLETED: 67183

## 2024-12-09 NOTE — PROGRESS NOTES
"Subjective     Sharonda Yeboah is a 54 y.o. female who presents for the following: Hidradenitis Suppurativa (Inner thigh and buttocks). Last derm visit 8/19/24 for hidradenitis suppurativa. She had a visit scheduled 10/28/24 that she had to cancel.     8/19/24 bacterial culture - normal demetra. Rx doxycycline PO    For future better control we had discussed spironolactone PO and cosentyx (tried and failed humira in past, caused psoriasis). Both would have to be cleared by oncology (triple negative breast cancer s/p treatment including immunotherapy currently in remission, next scans 10/2024). Would not start either one right now prior to surgery due to risk of blood pressure changes with spironolactone and poor wound healing with cosentyx     She had her knee replacement surgery 9/25/24    HIV screening 10/2023  Hepatitis C Ab 10/2023  CBC 11/8/24  CMP 11/8/24    She has surgery with plastic surgery at [facility]       Review of Systems:  No other skin or systemic complaints other than what is documented elsewhere in the note.    The following portions of the chart were reviewed this encounter and updated as appropriate:   Tobacco  Allergies  Meds  Problems  Med Hx  Surg Hx         Skin Cancer History  No skin cancer on file.      Specialty Problems          Dermatology Problems    Hidradenitis suppurativa     Hidradenitis - Many years. Surgery R axilla. Doxy/Fabio \"keeps it at bay.\" Humira c/b psoriasiswithin 1 month, stopped.Crohn's disease - diagnosed/treated at [facility] but quiescent for years per patient report              Objective   Well appearing patient in no apparent distress; mood and affect are within normal limits.    A focused skin examination was performed. All findings within normal limits unless otherwise noted below.    Assessment/Plan   1. Hidradenitis suppurativa  Gluteal Crease, Left Axilla, Left Medial Thigh, Right Axilla, Right Medial Thigh  Deep sinus tract formation and numerous scars in " these locations, approx 12 deep nodules today, zamarripa stage 3    -Discussed nature of condition  -Discussed treatment options    Prior treatments have included  - topicals: benzoyl peroxide and clindamycin lotion  - multiple rounds of antibiotics:  rifampin/clindamycin, could not tolerate the entire treatment course. Re-started minocycline, stopped working, switched to doxycycline 10/2023.   - Also tried Humira years ago but d/c'ed due to psoriasis.   - Surgery years ago at [facility] with recurrence  - Proceed with caution for spironolactone due to history of breast cancer  - Trial of ILK 1/30/24, she did not think it caused improvement but did not come for follow up appts    Other work-up:  - 8/19/24 bacterial culture - normal demetra. Rx doxycycline PO        After discussion again today, we agreed to proceed with cosentyx.     Relevant labs:  HIV screening 10/2023  Hepatitis C Ab 10/2023  CBC 11/8/24  CMP 11/8/24    Check t-spot and Hep B as listed    In the mean time, continue with ILK and refer to plastic surgery, she is interested in surgical management again in addition to medical management. Doing both in conjunction will likely help more    Intralesional injection - Right Medial Thigh  Intralesional Injection:   Consent:     Consent obtained:  Verbal    Consent given by:  Patient    Risks discussed:  Poor cosmetic result, pain, infection and bleeding    Alternatives discussed:  No treatment  Pre-Procedure Details:     Prep Type:  Isopropyl alcohol  Procedure Details:   Injection:  Triamcinolone  Outcome: patient tolerated procedure well  Post-procedure details: sterile dressing applied and wound care instructions given  Dressing type: bandage   Comments:  A total of 0.6 ml of 40 mg/mL Kenalog were injected into 3 lesion(s)  Lot #:   Expiration:     Related Procedures  Follow Up In Dermatology - Established Patient  T-SPOT (Tb)  Hepatitis B Core Antibody, Total  Hepatitis B Surface Antibody  Hepatitis B  Surface Antigen  Referral to Plastic Surgery  Follow Up In Dermatology - Established Patient    Related Medications  benzoyl peroxide (Benzac AC) 10 % external wash  Apply topically once daily. As directed leave on for 5 min then rinse    clindamycin (Cleocin T) 1 % lotion  Apply topically once daily in the morning. To sores or bumps    triamcinolone acetonide (Kenalog-40) injection 40 mg      secukinumab (Cosentyx Pen) 150 mg/mL self-injector pen  Inject 300 mg subcutaneously once a week for weeks 0, 1, 2, 3 and 4    secukinumab (Cosentyx Pen) 150 mg/mL self-injector pen  Inject 300 mg subcutaneously every 4 weeks    2. Nummular dermatitis  Left Ankle - Anterior, Left Lower Leg - Anterior, Right Ankle - Anterior, Right Lower Leg - Anterior  Thin circular brown plaques symmetrically on lower legs bilaterally on shins and medial calves, a few extending onto dorsal feet. Approx 8 total    Rx topical steroid    Related Medications  clobetasol (Temovate) 0.05 % ointment  Apply topically 2 times a day. To areas of eczema on legs for up to 2 weeks. Wait at [facility] 2 weeks before repeating again as needed for flares. Not safe for face        Follow up 6 weeks for repeat ILK as scheduled if needed  Otherwise follow up 3-4 months hidradenitis suppurativa

## 2024-12-10 ENCOUNTER — SPECIALTY PHARMACY (OUTPATIENT)
Dept: PHARMACY | Facility: CLINIC | Age: 54
End: 2024-12-10

## 2024-12-13 PROCEDURE — RXMED WILLOW AMBULATORY MEDICATION CHARGE

## 2024-12-19 ENCOUNTER — LAB (OUTPATIENT)
Dept: LAB | Facility: LAB | Age: 54
End: 2024-12-19
Payer: MEDICARE

## 2024-12-19 DIAGNOSIS — L73.2 HIDRADENITIS SUPPURATIVA: ICD-10-CM

## 2024-12-19 PROCEDURE — 86706 HEP B SURFACE ANTIBODY: CPT

## 2024-12-19 PROCEDURE — 86481 TB AG RESPONSE T-CELL SUSP: CPT

## 2024-12-19 PROCEDURE — 86704 HEP B CORE ANTIBODY TOTAL: CPT

## 2024-12-19 PROCEDURE — 87340 HEPATITIS B SURFACE AG IA: CPT

## 2024-12-19 PROCEDURE — 36415 COLL VENOUS BLD VENIPUNCTURE: CPT

## 2024-12-20 LAB
HBV CORE AB SER QL: NONREACTIVE
HBV SURFACE AB SER-ACNC: <3.1 MIU/ML
HBV SURFACE AG SERPL QL IA: NONREACTIVE

## 2024-12-22 LAB
NIL(NEG) CONTROL SPOT COUNT: NORMAL
PANEL A SPOT COUNT: 0
PANEL B SPOT COUNT: 0
POS CONTROL SPOT COUNT: NORMAL
T-SPOT. TB INTERPRETATION: NEGATIVE

## 2024-12-24 ENCOUNTER — SPECIALTY PHARMACY (OUTPATIENT)
Dept: PHARMACY | Facility: CLINIC | Age: 54
End: 2024-12-24

## 2024-12-26 ENCOUNTER — PHARMACY VISIT (OUTPATIENT)
Dept: PHARMACY | Facility: CLINIC | Age: 54
End: 2024-12-26
Payer: MEDICARE

## 2024-12-27 ENCOUNTER — SPECIALTY PHARMACY (OUTPATIENT)
Dept: PHARMACY | Facility: CLINIC | Age: 54
End: 2024-12-27

## 2024-12-27 NOTE — PROGRESS NOTES
[facility] Clinical Note  Initial Patient Education     Introduction  Sharonda Yeboah is a 54 y.o. female who is on the specialty pharmacy service for management of: Dermatology Core.    Sharonda Yeboah is initiating the following therapy: Cosentyx 300mg weekly for 5 weeks then every 4 weeks thereafter (name, dose, directions)    Medication receipt date: 12/27/24  Duration of therapy: Maintenance    The most recent encounter visit with the referring prescriber Dr. Jamia Carter on 12/9/24 was reviewed.  Pharmacy will continue to collaborate in the care of this patient with the referring prescriber.    Clinical Background  An initial assessment was conducted prior to first fill of the medication to determine the appropriateness of therapy given the patient's diagnosis, medication list, comorbidities, allergies, medical history, patient's ability to self administer medication, and therapeutic goals based on possible outcomes of therapy. Refer to initial assessment task completed on 12/24/24.    Labs for clinical appropriateness that were reviewed include:   Dermatology- For Biologics- TB:   Lab Results   Component Value Date    TBSIN Negative 12/19/2024   , Hepatitis B panel:   Lab Results   Component Value Date    HEPBCAB Nonreactive 12/19/2024    HEPBSAG Nonreactive 12/19/2024    HEPBSAB <3.1 12/19/2024   , Hepatitis C antibody:   Lab Results   Component Value Date    HEPCAB Nonreactive 10/16/2023   , and CMP and CBC reviewed and within normal limits    Education/Discussion  Sharonda was contacted on 12/27/2024 at 11:53 AM for a pharmacy visit with encounter number [id number] from:   [facility]  [address] [place] [id number]  Dept: [phone]  Dept Fax: [phone]  Sharonda consented to a/an Telephone visit, which was performed.    Medication Start Date (planned or actual): 12/29/24  Education was conducted prior to start of therapy?  "Yes    Education discussed includes the following:  Patient Education  Counseled the Patient on the Following : Pharmacy contact information, Doses and administration, Adherence and missed doses, Possible side effects and management, Safe handling, storage, and disposal, Contraindications and precautions, Associated vaccinations  Learner: Patient  Education Method: Explanation  Education Response: Verbalizes understanding  Additional details of the medication specific counseling are found within the linked patient education flowsheet.     The follow up timeline was discussed. Every person responds to and reacts to therapy differently. Patient should be assessed for efficacy and tolerability in approximately: 8-12 weeks    Provided education on goals and possible outcomes of therapy:  Adherence with therapy  Timely completion of appropriate labs  Timely and appropriate follow up with provider  Identify and address medication interactions with presciption medications, OTC medications and supplements  Optimize or maintain quality of life  Dermatology: Prevent or reduce disease flares  Reduce use of topical agents (corticosteroids or other \"prn\" agents)  Reduce track depth, pain, inflammation, skin lesions (HS)    The importance of adherence was discussed and they were advised to take the medication as prescribed by their provider.     Impression/Plan  Review and Assessment   Reviewed During This Encounter: Allergies, Medications, Problem list  Medications Assessed for Appropriate Use, Dose, Route, Frequency, and Duration: Yes  Medication Reconciliation Completed: Yes  Drug Interactions Evaluated: Yes  Clinically Relevant Drug Interactions Identified: No    This patient has not been identified as high risk due to Lack of high risk qualifiers.  The following action was taken: N/A.    QOL/Patient Satisfaction  Rate your quality of life on scale of 1-10:  (unable to assess)  Rate your satisfaction with [facility] Pharmacy on scale of 1-10:  (unable to assess)    The  Specialty Pharmacy Welcome packet may be viewed here:  [email or web address]       Or by scanning QR code:      Provided contact information ([phone]) for [facility] and reviewed dispensing process, refill timeline and patient management follow up. Advised to contact the pharmacy if there are any adverse effects and/or changes to medication list, including prescriptions, OTC medications, herbal products, or supplements. Confirmed understanding of education conducted during assessment. All questions and concerns were addressed and patient was encouraged to reach out for additional questions or concerns.      Ned Merrill, PharmD

## 2024-12-31 ENCOUNTER — TELEPHONE (OUTPATIENT)
Dept: PRIMARY CARE | Facility: CLINIC | Age: 54
End: 2024-12-31
Payer: MEDICARE

## 2024-12-31 NOTE — TELEPHONE ENCOUNTER
Pt came into office today wondering if its okay if she can take Burn Evolved 2.0 (dietary Supplement) please advise thank you.

## 2025-01-03 ENCOUNTER — SPECIALTY PHARMACY (OUTPATIENT)
Dept: PHARMACY | Facility: CLINIC | Age: 55
End: 2025-01-03

## 2025-01-10 ENCOUNTER — TELEPHONE (OUTPATIENT)
Dept: PRIMARY CARE | Facility: CLINIC | Age: 55
End: 2025-01-10
Payer: MEDICARE

## 2025-01-20 ENCOUNTER — TELEPHONE (OUTPATIENT)
Dept: DERMATOLOGY | Facility: CLINIC | Age: 55
End: 2025-01-20
Payer: MEDICARE

## 2025-01-20 ENCOUNTER — PATIENT OUTREACH (OUTPATIENT)
Dept: PRIMARY CARE | Facility: CLINIC | Age: 55
End: 2025-01-20
Payer: MEDICARE

## 2025-01-20 ENCOUNTER — APPOINTMENT (OUTPATIENT)
Dept: DERMATOLOGY | Facility: HOSPITAL | Age: 55
End: 2025-01-20
Payer: MEDICARE

## 2025-01-20 NOTE — TELEPHONE ENCOUNTER
Pt left message on Dr Hannah VM for Dr Carter that she was unable to take her Cosentyx for the past 2 Sundays , because she was in the hospital , and she has the flu ..what should she do ?

## 2025-01-22 ENCOUNTER — APPOINTMENT (OUTPATIENT)
Dept: PHYSICAL THERAPY | Facility: CLINIC | Age: 55
End: 2025-01-22
Payer: MEDICARE

## 2025-01-22 DIAGNOSIS — I97.2 LYMPHEDEMA SYNDROME, POSTMASTECTOMY: Primary | ICD-10-CM

## 2025-01-27 ENCOUNTER — APPOINTMENT (OUTPATIENT)
Dept: PRIMARY CARE | Facility: CLINIC | Age: 55
End: 2025-01-27
Payer: MEDICARE

## 2025-01-27 VITALS
HEIGHT: 63 IN | DIASTOLIC BLOOD PRESSURE: 82 MMHG | BODY MASS INDEX: 35.97 KG/M2 | OXYGEN SATURATION: 100 % | HEART RATE: 66 BPM | WEIGHT: 203 LBS | SYSTOLIC BLOOD PRESSURE: 137 MMHG

## 2025-01-27 DIAGNOSIS — E66.01 CLASS 2 SEVERE OBESITY DUE TO EXCESS CALORIES WITH SERIOUS COMORBIDITY AND BODY MASS INDEX (BMI) OF 35.0 TO 35.9 IN ADULT: ICD-10-CM

## 2025-01-27 DIAGNOSIS — I97.2 LYMPHEDEMA SYNDROME, POSTMASTECTOMY: ICD-10-CM

## 2025-01-27 DIAGNOSIS — E66.812 CLASS 2 SEVERE OBESITY DUE TO EXCESS CALORIES WITH SERIOUS COMORBIDITY AND BODY MASS INDEX (BMI) OF 35.0 TO 35.9 IN ADULT: ICD-10-CM

## 2025-01-27 DIAGNOSIS — C50.912 BREAST CANCER METASTASIZED TO AXILLARY LYMPH NODE, LEFT (MULTI): ICD-10-CM

## 2025-01-27 DIAGNOSIS — L73.2 HIDRADENITIS SUPPURATIVA: ICD-10-CM

## 2025-01-27 DIAGNOSIS — C77.3 BREAST CANCER METASTASIZED TO AXILLARY LYMPH NODE, LEFT (MULTI): ICD-10-CM

## 2025-01-27 DIAGNOSIS — Z86.718 HX OF DEEP VENOUS THROMBOSIS: Primary | ICD-10-CM

## 2025-01-27 PROCEDURE — 99495 TRANSJ CARE MGMT MOD F2F 14D: CPT | Performed by: FAMILY MEDICINE

## 2025-01-27 PROCEDURE — 3008F BODY MASS INDEX DOCD: CPT | Performed by: FAMILY MEDICINE

## 2025-01-27 PROCEDURE — 1036F TOBACCO NON-USER: CPT | Performed by: FAMILY MEDICINE

## 2025-01-27 NOTE — PROGRESS NOTES
"Subjective   Patient ID: Sharonda Yeboah is a 54 y.o. female who presents for Hospital Follow-up.    HPI     The patient was hospitalized recently 1-12-1/19/2025 for left-sided chest pain and had a left breast infection. Blood culture showed bacteremia, MRSE and she was treated with IV abx. CT chest 1/2025 revealed no fluid collection or masses. She was also noted to have flu A and started on Tamiflu. She has been restarted on Eliquis. She had melena while in the hospital and endoscopy showed gastritis.     She is fatigued. Otherwise, doing well now. She continues to have little bit of chest pain now. Denies fever, cough or melena.  She is taking Eliquis.  She has a colonoscopy scheduled 1/2025. She has done lymphedema physical and occupational therapy in the past.     The patient is on omeprazole for GERD, doxycyline for hidradenitis suppurativa and calcium. She is taking these medications as prescribed and is also being seen by Dermatology.     Review of Systems  Constitutional: No fever or chills  Cardiovascular: no chest pain, no palpitations and no syncope.   Respiratory: no cough, no shortness of breath during exertion and no shortness of breath at rest.   Gastrointestinal: no abdominal pain, no nausea and no vomiting.  Neuro: No Headache, no dizziness    Objective   /82   Pulse 66   Ht 1.6 m (5' 3\")   Wt 92.1 kg (203 lb)   SpO2 100%   BMI 35.96 kg/m²     Physical Exam  Constitutional: Alert and in no acute distress. Well developed, well nourished  Head and Face: Head and face: Normal.    Cardiovascular: Heart rate and rhythm were normal, normal S1 and S2. No peripheral edema.   Pulmonary: No respiratory distress. Clear bilateral breath sounds.  Musculoskeletal: Gait and station: Normal. Muscle strength/tone: Normal.   Skin: Normal skin color and pigmentation, normal skin turgor, and no rash.    Psychiatric: Judgment and insight: Intact. Mood and affect: Normal.      Lab Results   Component Value " Date    WBC 6.1 11/08/2024    HGB 11.1 (L) 11/08/2024    HCT 35.2 (L) 11/08/2024     11/08/2024    CHOL 196 11/08/2024    TRIG 114 11/08/2024    HDL 59.3 11/08/2024    ALT 8 11/08/2024    AST 12 11/08/2024     11/08/2024    K 4.6 11/08/2024     11/08/2024    CREATININE 0.82 11/08/2024    BUN 9 11/08/2024    CO2 29 11/08/2024    TSH 2.52 11/08/2024    INR 1.2 (H) 01/05/2024    HGBA1C 5.3 11/08/2024       BI mammo bilateral screening tomosynthesis  Narrative: Interpreted By:  Rocio Asif,   STUDY:  BI MAMMO BILATERAL SCREENING TOMOSYNTHESIS;  10/10/2024 10:43 am      ACCESSION NUMBER(S):  [id number]      ORDERING CLINICIAN:  FUNMI MARINELLI      INDICATION:  Screening. Left lumpectomy with radiation and chemotherapy in 2023.      ,Z12.31 Encounter for screening mammogram for malignant neoplasm of  breast      COMPARISON:  10/24/2023 and 10/05/2022.      FINDINGS:  2D and tomosynthesis images were reviewed at 1 mm slice thickness.      Density:  The breasts are heterogeneously dense, which may obscure  small masses.      Postsurgical scarring with associated clips and new dystrophic  calcifications is seen in the central lateral left breast at mid to  posterior depth. The diffuse left breast skin thickening has  lessened. Additional scarring is noted in the left axilla. No  suspicious masses or calcifications are identified in either breast.      Impression: Left breast post treatment changes. No mammographic evidence of  malignancy.      BI-RADS CATEGORY:  BI-RADS Category:  2 Benign.  Recommendation:  Annual Screening.  Recommended Date:  1 Year.  Laterality:  Bilateral.              For any future breast imaging appointments, please call [phone] ([phone]).          MACRO:  None      Signed by: Rocio Asif 10/14/2024 2:42 PM  Dictation workstation:   [id number]      Assessment/Plan   Assessment & Plan  Hx of deep venous thrombosis  Continue Eliquis.       Hidradenitis suppurativa  The patient is  on doxycyline and is seeing Dermatology.        Breast cancer metastasized to axillary lymph node, left (Multi)  Stable.  Follows with Breast surgery.        Class 2 severe obesity due to excess calories with serious comorbidity and body mass index (BMI) of 35.0 to 35.9 in adult  Blood work ordered   Orders:    CBC; Future    Comprehensive Metabolic Panel; Future    Lymphedema syndrome, postmastectomy  Stable. Follows with Lymphedema Clinic.                     Your yearly Physical is due in: October 2025  When you call the office for your yearly Physical, please ask them to inform me to order your blood work, so that you can get the fasting blood work before your appointment and we can discuss the results at your physical.      Please call me if any questions arise from now until your next visit. I will call you after I am done seeing patients. A Doctor is always available by phone when the office is closed. Please feel free to call for help with any problem that you feel shouldn't wait until the office re-opens.     Scribe Attestation  By signing my name below, IAntonio Scribe   attest that this documentation has been prepared under the direction and in the presence of Pati Mchugh MD.

## 2025-01-27 NOTE — PROGRESS NOTES
"Subjective   Patient ID: Sharonda Yeboah is a 54 y.o. female who presents for Hospital Follow-up.    HPI   The patient was admitted from 1/12-1/19 for L chest pain with positive blood cultures and treated with vancomycin. She then started experiencing melena and underwent an endoscopy that showed gastritis. She also was influenza positive in the hospital and was treated with tamiflu.    The patient has a colonoscopy scheduled for 2/6. Today the patient is feeling \"ok\", just a bit fatigued. The patient denies any fever like symptoms or recent melena. She does endorse pleuritic chest pain and pain with certain movements. The pain has improved but is still lingering    The patient is taking eliquis daily for a history of DVT.       Objective   /82   Pulse 66   Ht 1.6 m (5' 3\")   Wt 92.1 kg (203 lb)   SpO2 100%   BMI 35.96 kg/m²     Physical Exam  Constitutional:       Appearance: Normal appearance.   Cardiovascular:      Rate and Rhythm: Normal rate and regular rhythm.   Pulmonary:      Effort: Pulmonary effort is normal.      Breath sounds: Normal breath sounds.   Neurological:      Mental Status: She is alert.   Psychiatric:         Mood and Affect: Mood normal.         Behavior: Behavior normal.         Assessment/Plan     hSaronda Yeboah is a 54 y.o. female who presents for Hospital Follow-up. The patient was admitted from 1/12-1/19 for L chest pain with subsequent bacteremia and was treated with vancomycin. EKG and negative troponin not indicative of ACS and the patient's d-dimer was within normal limits. Given this protracted chest pain that is positional and pleuritic, the most likely diagnosis is pleurisy secondary to influenza and should resolve over time.    Problem List Items Addressed This Visit             ICD-10-CM    Breast cancer metastasized to axillary lymph node, left (Multi) C50.912, C77.3     Stable.  Follows with Breast surgery.               Hidradenitis suppurativa L73.2     The patient " is on doxycyline and is seeing Dermatology.               Lymphedema syndrome, postmastectomy I97.2     Stable. Follows with Lymphedema Clinic.                Hx of deep venous thrombosis - Primary Z86.718     Continue Eliquis.               Other Visit Diagnoses         Codes    Class 2 severe obesity due to excess calories with serious comorbidity and body mass index (BMI) of 35.0 to 35.9 in adult     E66.812, E66.01, Z68.35    Relevant Orders    CBC    Comprehensive Metabolic Panel

## 2025-01-29 ENCOUNTER — SPECIALTY PHARMACY (OUTPATIENT)
Dept: PHARMACY | Facility: CLINIC | Age: 55
End: 2025-01-29

## 2025-01-29 LAB
ALBUMIN SERPL-MCNC: 3.9 G/DL (ref 3.6–5.1)
ALP SERPL-CCNC: 88 U/L (ref 37–153)
ALT SERPL-CCNC: 9 U/L (ref 6–29)
ANION GAP SERPL CALCULATED.4IONS-SCNC: 6 MMOL/L (CALC) (ref 7–17)
AST SERPL-CCNC: 11 U/L (ref 10–35)
BILIRUB SERPL-MCNC: 0.3 MG/DL (ref 0.2–1.2)
BUN SERPL-MCNC: 9 MG/DL (ref 7–25)
CALCIUM SERPL-MCNC: 9.1 MG/DL (ref 8.6–10.4)
CHLORIDE SERPL-SCNC: 105 MMOL/L (ref 98–110)
CO2 SERPL-SCNC: 27 MMOL/L (ref 20–32)
CREAT SERPL-MCNC: 0.8 MG/DL (ref 0.5–1.03)
EGFRCR SERPLBLD CKD-EPI 2021: 86 ML/MIN/1.73M2
ERYTHROCYTE [DISTWIDTH] IN BLOOD BY AUTOMATED COUNT: 16.5 % (ref 11–15)
GLUCOSE SERPL-MCNC: 101 MG/DL (ref 65–99)
HCT VFR BLD AUTO: 33.9 % (ref 35–45)
HGB BLD-MCNC: 10.1 G/DL (ref 11.7–15.5)
MCH RBC QN AUTO: 26.1 PG (ref 27–33)
MCHC RBC AUTO-ENTMCNC: 29.8 G/DL (ref 32–36)
MCV RBC AUTO: 87.6 FL (ref 80–100)
PLATELET # BLD AUTO: 409 THOUSAND/UL (ref 140–400)
PMV BLD REES-ECKER: 10.3 FL (ref 7.5–12.5)
POTASSIUM SERPL-SCNC: 4.1 MMOL/L (ref 3.5–5.3)
PROT SERPL-MCNC: 6.5 G/DL (ref 6.1–8.1)
RBC # BLD AUTO: 3.87 MILLION/UL (ref 3.8–5.1)
SODIUM SERPL-SCNC: 138 MMOL/L (ref 135–146)
WBC # BLD AUTO: 6.5 THOUSAND/UL (ref 3.8–10.8)

## 2025-01-30 ENCOUNTER — PATIENT OUTREACH (OUTPATIENT)
Dept: PRIMARY CARE | Facility: CLINIC | Age: 55
End: 2025-01-30
Payer: MEDICARE

## 2025-01-30 NOTE — PROGRESS NOTES
Call regarding appt. with PCP on 1/27/25  after hospitalization.  At time of outreach call the patient feels as if their condition has improved  some still don't have a lot of energy.  She stated that her left was swollen on Monday but has improved  now . Advised her to keep an eye on it and if it swells again she needs to go back to the ED to get it checked out.    Reviewed the PCP appointment with the pt and addressed any questions or concerns.

## 2025-02-10 ENCOUNTER — TELEPHONE (OUTPATIENT)
Dept: PRIMARY CARE | Facility: CLINIC | Age: 55
End: 2025-02-10
Payer: MEDICARE

## 2025-02-10 DIAGNOSIS — K21.9 GASTROESOPHAGEAL REFLUX DISEASE WITHOUT ESOPHAGITIS: Primary | ICD-10-CM

## 2025-02-10 RX ORDER — OMEPRAZOLE 40 MG/1
40 CAPSULE, DELAYED RELEASE ORAL
Qty: 90 CAPSULE | Refills: 0 | Status: SHIPPED | OUTPATIENT
Start: 2025-02-10

## 2025-02-10 NOTE — TELEPHONE ENCOUNTER
Spoke to patient she had questions about her Eliquis as her hematologist was not going to refill it.  Advised her to try and get in with a hematologist so she can discuss her issues.  She complained of reflux and will be sending her omeprazole

## 2025-02-11 ENCOUNTER — TELEPHONE (OUTPATIENT)
Dept: PRIMARY CARE | Facility: CLINIC | Age: 55
End: 2025-02-11
Payer: MEDICARE

## 2025-02-11 DIAGNOSIS — K21.9 GASTROESOPHAGEAL REFLUX DISEASE WITHOUT ESOPHAGITIS: Primary | ICD-10-CM

## 2025-02-12 PROCEDURE — RXMED WILLOW AMBULATORY MEDICATION CHARGE

## 2025-02-12 NOTE — TELEPHONE ENCOUNTER
Talked with pt. She said she had blood work a week ago and noticed some of her test were high results and some of her test were low results, pt wanted to know about them and if there were any concerns

## 2025-02-13 ENCOUNTER — PHARMACY VISIT (OUTPATIENT)
Dept: PHARMACY | Facility: CLINIC | Age: 55
End: 2025-02-13
Payer: MEDICARE

## 2025-02-13 NOTE — TELEPHONE ENCOUNTER
Called pt wants to know why her hemoglobin is low and what dose that mean. What dose she need to do to improve this

## 2025-02-14 ENCOUNTER — PATIENT OUTREACH (OUTPATIENT)
Dept: PRIMARY CARE | Facility: CLINIC | Age: 55
End: 2025-02-14
Payer: MEDICARE

## 2025-02-18 NOTE — TELEPHONE ENCOUNTER
Patient wants you to call her about stomach issues, lots of diarrhea, unable to eat or drink without stomach hurting. Diarrhea since colonoscopy over a week ago

## 2025-02-20 ENCOUNTER — TELEPHONE (OUTPATIENT)
Dept: PRIMARY CARE | Facility: CLINIC | Age: 55
End: 2025-02-20
Payer: MEDICARE

## 2025-02-20 NOTE — TELEPHONE ENCOUNTER
Patient called again asking for a prescription for the probiotics you recommended so she can get them for free through insurance since she is not working right now

## 2025-02-28 ENCOUNTER — OFFICE VISIT (OUTPATIENT)
Dept: HEMATOLOGY/ONCOLOGY | Facility: HOSPITAL | Age: 55
End: 2025-02-28
Payer: MEDICARE

## 2025-02-28 VITALS
OXYGEN SATURATION: 98 % | DIASTOLIC BLOOD PRESSURE: 80 MMHG | HEIGHT: 63 IN | SYSTOLIC BLOOD PRESSURE: 122 MMHG | RESPIRATION RATE: 20 BRPM | TEMPERATURE: 96.8 F | BODY MASS INDEX: 36.29 KG/M2 | WEIGHT: 204.81 LBS | HEART RATE: 66 BPM

## 2025-02-28 DIAGNOSIS — C50.912 MALIGNANT NEOPLASM OF LEFT BREAST IN FEMALE, ESTROGEN RECEPTOR NEGATIVE, UNSPECIFIED SITE OF BREAST: ICD-10-CM

## 2025-02-28 DIAGNOSIS — Z17.1 MALIGNANT NEOPLASM OF LEFT BREAST IN FEMALE, ESTROGEN RECEPTOR NEGATIVE, UNSPECIFIED SITE OF BREAST: ICD-10-CM

## 2025-02-28 DIAGNOSIS — I82.4Y2 ACUTE DEEP VEIN THROMBOSIS (DVT) OF PROXIMAL VEIN OF LEFT LOWER EXTREMITY (MULTI): ICD-10-CM

## 2025-02-28 DIAGNOSIS — C77.3 BREAST CANCER METASTASIZED TO AXILLARY LYMPH NODE, LEFT (MULTI): Primary | ICD-10-CM

## 2025-02-28 DIAGNOSIS — C50.912 BREAST CANCER METASTASIZED TO AXILLARY LYMPH NODE, LEFT (MULTI): Primary | ICD-10-CM

## 2025-02-28 DIAGNOSIS — Z12.31 SCREENING MAMMOGRAM FOR BREAST CANCER: ICD-10-CM

## 2025-02-28 PROCEDURE — 3008F BODY MASS INDEX DOCD: CPT

## 2025-02-28 PROCEDURE — 99214 OFFICE O/P EST MOD 30 MIN: CPT

## 2025-02-28 RX ORDER — BIOTIN 1 MG
1000 TABLET ORAL DAILY
COMMUNITY

## 2025-02-28 ASSESSMENT — PAIN SCALES - GENERAL: PAINLEVEL_OUTOF10: 0-NO PAIN

## 2025-02-28 NOTE — PROGRESS NOTES
DIVISION OF MEDICAL ONCOLOGY    Patient ID: Sharonda Yeboah is a 54 y.o. female.  MRN: [id number]  : 1970  The patient presents to clinic today for her history of left-sided breast cancer.    Diagnostic/Therapeutic History:  -21: Bilateral screening mammogram was negative.  -Palpated left axillary lump. Saw PCP.  -10/5/22: Diagnostic mammogram/US showed a 3.6 x 3.0 x 3.4 cm irregular bilobed mass at 2:00, 7 cm FN. Four abnormal left axillary LN’s seen. In the right axilla, a 4.1 cm superficial fatty mass noted, likely a lipoma.  -10/12/22: US-guided CNB confirmed IDC, grade 3, ER/NE-negative Her2-negative (1+ IHC). A left axillary LN was positive for metastatic carcinoma.  -10/19/22: Breast MRI confirmed a 4.4 cm irregular mass with central necrosis in the left breast. Two bulky level 1 axillary LN’s seen (no level 2/3 LN’s identified). A peripheral rim-enhancing mass seen in left internal mammary region,  measuring 0.9 x 0.8 cm, concerning for malignant lymph node. Left axillary skin thickening seen.  -Neoadjuvant chemotherapy with [id number]-522 regimen, 22-23. She required dose-reductions for SBO's that were managed conservatively.  -23: Left lumpectomy/SLNBx performed. pathology: pCR to therapy.  -Received 3 doses of adjuvant pembrolizumab, but decided to stop for abdominal symptoms and diarrhea.  -23 - 23: Radiation therapy to the left breast, SCV consisting of a dose of 42.56 Gy with additional 10 Gy to the tumor bed for a total of 52.56 Gy.     History of Present Illness (HPI)/Interval History:  Sharonda Yeboah presents today for routine FUV.  Overall, she has been feeling well since her last appointment. However did develop a left LE DVT after her knee replacement in Sept. She is back on Eliquis, in need of a hematology.   Ongoing left breast edema and discomfort noted, improved with PT.However, did have a breast infection in 2025, would like to go back to PT or see a  PM&R/plastic surgeon for further considerations of management   Notes some fatigue, but able to perform her usual activities.  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     Review of Systems:  14-point ROS otherwise negative, as per HPI/Interval History.    Past Medical History:   Diagnosis Date    Abnormal levels of other serum enzymes 04/07/2020    Elevated alkaline phosphatase level    Acute candidiasis of vulva and vagina 12/23/2019    Vaginal candidiasis    Breast cancer (Multi) 05/01/2023    left lumpectomy w/rad & chemo    Change in bowel habit 07/19/2017    Encounter for diagnostic colonoscopy due to change in bowel habits    Contact with and (suspected) exposure to potentially hazardous body fluids 07/25/2017    History of exposure to hazardous bodily fluids    Dorsalgia, unspecified 06/17/2015    Acute back pain    Encounter for follow-up examination after completed treatment for conditions other than malignant neoplasm 02/17/2018    Hospital discharge follow-up    Encounter for screening for respiratory tuberculosis 07/25/2017    Encounter for screening for respiratory tuberculosis    Endometriosis, unspecified 10/18/2022    Endometriosis    Fever, unspecified 12/28/2021    Fever and chills    Functional intestinal disorder, unspecified 10/18/2022    Functional bowel disorder    Generalized abdominal pain 06/06/2017    Generalized abdominal pain    Hx antineoplastic chemo     Low back pain, unspecified 10/18/2022    Lumbar pain    Lymphedema syndrome, postmastectomy 11/03/2023    Other conditions influencing health status     Arthritis    Other conditions influencing health status     History of dyspareunia    Other specified cough 09/25/2019    Productive cough    Pain in left knee 12/29/2016    Left knee pain    Personal history of diseases of the blood and blood-forming organs and certain disorders involving the immune mechanism  07/25/2017    History of anemia    Personal history of diseases of the blood and blood-forming organs and certain disorders involving the immune mechanism 07/25/2017    History of anemia    Personal history of diseases of the skin and subcutaneous tissue 06/06/2017    History of eczema    Personal history of irradiation     Personal history of other diseases of the digestive system 07/19/2017    History of constipation    Personal history of other diseases of the digestive system     History of esophageal reflux    Personal history of other diseases of the musculoskeletal system and connective tissue 02/16/2017    History of neck pain    Personal history of other diseases of the musculoskeletal system and connective tissue 01/19/2017    History of back pain    Personal history of other diseases of the nervous system and sense organs     History of migraine headaches    Personal history of other diseases of the respiratory system 12/31/2020    History of acute bacterial sinusitis    Personal history of other diseases of the respiratory system 01/21/2021    History of sore throat    Personal history of other diseases of the respiratory system 06/04/2019    History of acute bronchitis    Personal history of other mental and behavioral disorders 10/18/2022    History of depression    Personal history of other specified conditions 06/06/2017    History of exertional chest pain    Personal history of other specified conditions 08/02/2017    History of abdominal pain    Personal history of other specified conditions 10/23/2020    History of fatigue    Personal history of other specified conditions 07/25/2017    History of fatigue    Personal history of other specified conditions 02/17/2018    History of diarrhea    Plantar fascial fibromatosis 03/22/2014    Plantar fasciitis, right    Sprain of unspecified rotator cuff capsule, initial encounter 10/18/2022    Rotator cuff (capsule) sprain    Tobacco abuse counseling  2017    Encounter for tobacco use cessation counseling     Patient Active Problem List   Diagnosis    Breast cancer metastasized to axillary lymph node, left (Multi)    Carpal tunnel syndrome, right    Cervical spinal stenosis    Goiter    Hidradenitis suppurativa    Uterine fibroid    Vasomotor symptoms due to menopause    Vitamin D deficiency    Medicare annual wellness visit, subsequent    Status post partial mastectomy of left breast    S/P radiation therapy    S/P chemotherapy, time since greater than 12 weeks    Lymphedema syndrome, postmastectomy    Hx of deep venous thrombosis        Past Surgical History:   Procedure Laterality Date    BREAST LUMPECTOMY Left 2023    left lumpectomy w/rad & chemo     SECTION, CLASSIC  10/18/2022     Section    CHOLECYSTECTOMY  2013    Cholecystectomy    CT ABDOMEN PELVIS ANGIOGRAM W AND/OR WO IV CONTRAST  2023    CT ABDOMEN PELVIS ANGIOGRAM W AND/OR WO IV CONTRAST [facility] CT    HERNIA REPAIR  10/18/2022    Hernia Repair    HYSTEROSCOPY  2014    Hysteroscopy With Endometrial Ablation    OTHER SURGICAL HISTORY  2013    Laparoscopic Appendectomy Incidental    OTHER SURGICAL HISTORY  2013    Craniotomy (Therapeutic)    OTHER SURGICAL HISTORY  2013    Arthrotomy Of Knee With Lateral Meniscectomy    OTHER SURGICAL HISTORY  10/18/2022    Intracranial Aneurysm Repair    ROTATOR CUFF REPAIR  10/18/2022    Rotator Cuff Repair    SHOULDER SURGERY  2014    Shoulder Surgery Right    TUBAL LIGATION  10/18/2022    Tubal Ligation    UMBILICAL HERNIA REPAIR  2013    Umbilical Hernia Repair       Social History     Socioeconomic History    Marital status: Single    Number of children: 4   Tobacco Use    Smoking status: Former     Current packs/day: 0.00     Average packs/day: 0.3 packs/day for 5.0 years (1.3 ttl pk-yrs)     Types: Cigarettes     Start date: 10/1/2017     Quit date: 10/1/2022     Years since quittin.4     Smokeless tobacco: Never   Substance and Sexual Activity    Alcohol use: Not Currently    Drug use: Never    Sexual activity: Not Currently     Birth control/protection: Post-menopausal     Social Drivers of Health     Financial Resource Strain: Low Risk  (12/17/2023)    Received from [facility]    Overall Financial Resource Strain (CARDIA)     Difficulty of Paying Living Expenses: Not hard at all   Food Insecurity: No Food Insecurity (1/14/2025)    Received from [facility]    Hunger Vital Sign     Worried About Running Out of Food in the Last Year: Never true     Ran Out of Food in the Last Year: Never true   Transportation Needs: No Transportation Needs (1/14/2025)    Received from [facility]    PRAPARE - Transportation     Lack of Transportation (Medical): No     Lack of Transportation (Non-Medical): No   Physical Activity: Sufficiently Active (3/8/2024)    Received from [facility]    Exercise Vital Sign     Days of Exercise per Week: 4 days     Minutes of Exercise per Session: 60 min   Stress: No Stress Concern Present (3/8/2024)    Received from [facility] - Occupational Stress Questionnaire     Feeling of Stress : Only a little   Social Connections: Moderately Isolated (3/8/2024)    Received from [facility]    Social Connection and Isolation Panel [NHANES]     Frequency of Communication with Friends and Family: More than three times a week     Frequency of Social Gatherings with Friends and Family: Once a week     Attends Anabaptist Services: More than 4 times per year     Active Member of Clubs or Organizations: No     Attends Club or Organization Meetings: Never     Marital Status: Never    Housing Stability: Unknown (1/14/2025)    Received from [facility]    Housing Stability Vital Sign     Unable to Pay for Housing in the Last Year: No      Homeless in the Last Year: No       Family History   Problem Relation Name Age of Onset    Hypertension Mother      Diabetes type II Mother         Allergies:   No Known Allergies      Current Outpatient Medications:     apixaban (ELIQUIS DVT-PE TREAT 30D START ORAL), Take by mouth., Disp: , Rfl:     Bacillus coagulans 400 million cell tablet,chewable, Chew 1 tablet 2 times a day., Disp: 180 tablet, Rfl: 3    benzoyl peroxide (Benzac AC) 10 % external wash, Apply topically once daily. As directed leave on for 5 min then rinse, Disp: 227 g, Rfl: 11    calcium carbonate (Oscal) 500 mg calcium (1,250 mg) tablet, Take 1 tablet (1,250 mg) by mouth 2 times daily (morning and late afternoon)., Disp: , Rfl:     clindamycin (Cleocin T) 1 % lotion, Apply topically once daily in the morning. To sores or bumps (Patient not taking: Reported on 10/30/2024), Disp: 60 mL, Rfl: 11    clobetasol (Temovate) 0.05 % ointment, Apply topically 2 times a day. To areas of eczema on legs for up to 2 weeks. Wait at as 2 weeks before repeating again as needed for flares. Not safe for face, Disp: 60 g, Rfl: 1    multivitamin tablet, Take 1 tablet by mouth once daily., Disp: , Rfl:     omeprazole (PriLOSEC) 40 mg DR capsule, Take 1 capsule (40 mg) by mouth once daily in the morning. Take before meals., Disp: 90 capsule, Rfl: 0    secukinumab (Cosentyx Pen) 150 mg/mL self-injector pen, Inject 2 pens (300 mg) under the skin once a week at weeks 0, 1, 2, 3 and 4 for loading doses., Disp: 10 mL, Rfl: 0    secukinumab (Cosentyx Pen) 150 mg/mL self-injector pen, Inject 300 mg subcutaneously every 4 weeks, Disp: 2 mL, Rfl: 5     Objective    BSA: There is no height or weight on file to calculate BSA.  There were no vitals taken for this visit.    ECOG Performance Status:  The ECOG performance scale today is ECO- Restricted in physically strenuous activity.  Carries out light duty.    Physical Exam  Vitals reviewed.   Constitutional:        General: She is not in acute distress.     Appearance: She is not toxic-appearing.   HENT:      Mouth/Throat:      Mouth: Mucous membranes are moist.      Pharynx: Oropharynx is clear.   Eyes:      General: No scleral icterus.     Extraocular Movements: Extraocular movements intact.      Conjunctiva/sclera: Conjunctivae normal.   Cardiovascular:      Rate and Rhythm: Normal rate and regular rhythm.   Pulmonary:      Effort: Pulmonary effort is normal. No respiratory distress.   Musculoskeletal:         General: No swelling or tenderness.   Skin:     General: Skin is warm and dry.      Coloration: Skin is not jaundiced.   Neurological:      General: No focal deficit present.      Mental Status: She is alert and oriented to person, place, and time.   Psychiatric:         Mood and Affect: Mood normal.         Behavior: Behavior normal.         Thought Content: Thought content normal.         Judgment: Judgment normal.         Laboratory Data:  Lab Results   Component Value Date    WBC 6.5 01/28/2025    HGB 10.1 (L) 01/28/2025    HCT 33.9 (L) 01/28/2025    MCV 87.6 01/28/2025     (H) 01/28/2025    ANC 2.87 08/04/2023       Chemistry    Lab Results   Component Value Date/Time     01/28/2025 1517    K 4.1 01/28/2025 1517     01/28/2025 1517    CO2 27 01/28/2025 1517    BUN 9 01/28/2025 1517    CREATININE 0.80 01/28/2025 1517    Lab Results   Component Value Date/Time    CALCIUM 9.1 01/28/2025 1517    ALKPHOS 88 01/28/2025 1517    AST 11 01/28/2025 1517    ALT 9 01/28/2025 1517    BILITOT 0.3 01/28/2025 1517        Radiology:  === 02/06/24 ===    CT ANGIO CHEST FOR PULMONARY EMBOLISM    - Impression -  1. No evidence of acute pulmonary embolism.  2. Chronic radiation changes and traction bronchiectasis of the left  upper lobe.  3. Postsurgical changes in the left breast status post  lumpectomy/left axillary lymph node dissection as described above.  There is nonspecific soft tissue  thickening/stranding/edema involving  the deep tissues of the visualized portions of the left breast and  chest wall. Findings are favored to represent postradiation changes.  However recommend clinical with physical exam if clinical concern for  mastitis. Also possibility of locoregional disease/tumor recurrence  cannot be excluded. Correlate clinically and consider correlate with  dedicated breast imaging.  4. Stable 2 mm calcified granuloma involving the left upper lobe as  described above.    I personally reviewed the images/study and I agree with the findings  as stated by resident Levi Barriga this study was interpreted at  [place], [place].    MACRO:  None    Signed by: Elizabeth Ramos 2/6/2024 2:31 PM  Dictation workstation:   [id number]      Assessment/Plan:  Sharonda Yeboah is a 54 y.o. female with a history of left-sided triple-negative breast cancer, who presents today for follow-up evaluation on observation.     1. Left breast cancer. Stage IIIC (cT2-3 cN3b cM0), grade 3 IDC, triple-negative.    S/p neoadjuvant chemoimmunotherapy with pCR obtained. S/p lumpectomy and adjuvant RT.  - No concerning symptoms or exam findings reported today.  - Ongoing left breast lymphedema noted: referral to PM&R   - Mammogram 10/10/2024: CAT 2, continue yearly screenings     2. Hidradenitis suppurativa. Diagnosed in late-teens. S/p multiple lines of therapy, including axillary surgery. S/p infection in Jan 2025.   - Following with Dr. Jamia Carter from Dermatology. On minocycline.     3. H/o recurrent SBO. S/p multiple hernia repair surgeries and prior bowel resection for SBO.  - Last admitted 12/2023, managed conservatively. Currently no symptoms.     4. LLE DVT. Provoked in the setting of chemotherapy and prior hospitalization.   New DVT 12/2024 2/2 knee replacement   - Placed back on Eliquis, in need of a hematology referral, placed today   - No evidence of  recurrent DVTs today     Disposition.  - RTC 6 months with Celina Nieves PA-C   - She has our contact information and was instructed to call with concerns/questions in the interim.    No orders of the defined types were placed in this encounter.     Celina Nieves PA-C  Hematology and Medical Oncology  [facility]

## 2025-03-05 ENCOUNTER — HOSPITAL ENCOUNTER (OUTPATIENT)
Dept: RADIOLOGY | Facility: HOSPITAL | Age: 55
Discharge: HOME | End: 2025-03-05
Payer: MEDICARE

## 2025-03-05 DIAGNOSIS — Z13.6 SCREENING FOR CARDIOVASCULAR CONDITION: ICD-10-CM

## 2025-03-05 PROCEDURE — 75571 CT HRT W/O DYE W/CA TEST: CPT

## 2025-03-10 DIAGNOSIS — Z86.718 HX OF DEEP VENOUS THROMBOSIS: Primary | ICD-10-CM

## 2025-03-10 RX ORDER — APIXABAN 5 MG (74)
KIT ORAL
Status: CANCELLED | OUTPATIENT
Start: 2025-03-10

## 2025-03-17 ENCOUNTER — PATIENT OUTREACH (OUTPATIENT)
Dept: PRIMARY CARE | Facility: CLINIC | Age: 55
End: 2025-03-17
Payer: MEDICARE

## 2025-03-17 ENCOUNTER — TELEPHONE (OUTPATIENT)
Dept: PHYSICAL THERAPY | Facility: CLINIC | Age: 55
End: 2025-03-17

## 2025-03-17 DIAGNOSIS — I97.2 LYMPHEDEMA SYNDROME, POSTMASTECTOMY: Primary | ICD-10-CM

## 2025-03-18 ENCOUNTER — SPECIALTY PHARMACY (OUTPATIENT)
Dept: PHARMACY | Facility: CLINIC | Age: 55
End: 2025-03-18

## 2025-03-18 ENCOUNTER — PATIENT OUTREACH (OUTPATIENT)
Dept: PRIMARY CARE | Facility: CLINIC | Age: 55
End: 2025-03-18
Payer: MEDICARE

## 2025-03-25 ENCOUNTER — TELEPHONE (OUTPATIENT)
Dept: DERMATOLOGY | Facility: CLINIC | Age: 55
End: 2025-03-25
Payer: MEDICARE

## 2025-03-25 NOTE — TELEPHONE ENCOUNTER
I called as well and could not connect.  is now full so I could not leave a message.     Per  specialty pharmacy team:    Good afternoon,     We received a call from Sharonda Yeboah regarding side effects that the patient has been experiencing. She notes that she has developed a rash on her face and that she is using hydrocortisone cream daily to treat it. She wanted us to reach out to see if there was anything that could be prescribed to help instead of the hydrocortisone cream. She also mentioned that she has been experiencing diarrhea 3-5 times a day for the last 2 weeks with stomach pain. I just wanted to bring this to your attention.     Regards,     Garrison Naranjo

## 2025-03-26 ENCOUNTER — EVALUATION (OUTPATIENT)
Dept: PHYSICAL THERAPY | Facility: CLINIC | Age: 55
End: 2025-03-26
Payer: MEDICARE

## 2025-03-26 DIAGNOSIS — I97.2 LYMPHEDEMA SYNDROME, POSTMASTECTOMY: Primary | ICD-10-CM

## 2025-03-26 PROCEDURE — 97163 PT EVAL HIGH COMPLEX 45 MIN: CPT | Mod: GP

## 2025-03-26 ASSESSMENT — ENCOUNTER SYMPTOMS
LOSS OF SENSATION IN FEET: 0
OCCASIONAL FEELINGS OF UNSTEADINESS: 0
DEPRESSION: 0

## 2025-03-26 ASSESSMENT — PAIN - FUNCTIONAL ASSESSMENT: PAIN_FUNCTIONAL_ASSESSMENT: 0-10

## 2025-03-26 ASSESSMENT — PAIN SCALES - GENERAL: PAINLEVEL_OUTOF10: 0 - NO PAIN

## 2025-03-26 NOTE — PROGRESS NOTES
Physical Therapy Evaluation and Treatment      Patient Name: Sharonda Yeboah  MRN: [id number]  Today's Date: 3/26/2025  Time Calculation  Start Time: 1345  Stop Time: 1445  Time Calculation (min): 60 min    High complexity due to patient's clinical presentation being unstable, with evolving and unpredictable characteristics, with comorbidities/complexities to include left breast cancer,, h/o DVT, CTS, cervical stenosis, hidadenitis suppurativa ,TKA, cerebral aneurysm s/p clipping, all of which may negatively impact rehab tolerance and progression.    Insurance:  Visit number: 1 of pending  Authorization info: pending  Insurance Type: Payor: [employer]ARE / Plan: [employer] DUAL ADVANTAGE / Product Type: *No Product type* /     Current Problem:   1. Lymphedema syndrome, postmastectomy  Referral to Physical Therapy    Follow Up In Physical Therapy        General:  Reason for Referral: lymphedema  Referred By: NANCY Nieves  Past Medical History Relevant to Rehab: left breast cancer,, h/o DVT, CTS, cervical stenosis, hidadenitis suppurativa    Precautions:  STEADI Fall Risk Score (The score of 4 or more indicates an increased risk of falling): 0  Medical Precautions: Lymphedema precautions    Subjective    Pt diagnosed with left breast cancer 2023 metastasized to axillary lymph node earlier this year. Had left lumpectomy with 4 lymph nodes removed.  Pt has completed chemo and radiation.  Pt states that left breast has been swollen since surgery, worse after radiation.      Pt was seen by this therapist in 2024 for lymphedema of the left breast.  Was seen for 8 visits; stopped due to other medical issues.  Pt has been wearing compression bra daily.  On 1/12/2025 went to ER with edema and severe pain in left breast; diagnosed with cellulitis and MRSA.  Since then, breast edema has improved but still present and feel heavy.  Has not had any pain recently.    Pt is right handed    Pt's goal is to reduce the swelling in left breast  and continued no pain     Pt denies heart and kidney disease.  Has hidradenitis which affects axilla.     Pain:  Pain Assessment  Pain Assessment: 0-10  0-10 (Numeric) Pain Score: 0 - No pain    Objective   ROM    B UE WFL    MMT    B UE WFL    Skin Integrity/Condition   Left breast slightly darker than right  No pitting  Mod edema present with left breast visibly larger than right, Slight edema in left axilla  Scar left lateral breast well-healed, no tenderness  Scarring present in left axilla from hidradenditis    Arm Girth               Right        Left    Hand  18.5cm    19.3cm    Wrist  16.1cm     15.5cm    8cm    20.9cm     19.8cm    16cm  26.1cm     25.8cm    24cm  32.2cm     30.3cm    32cm  36.0cm     33.5cm    40cm  38.0cm     36.8cm    Outcome Measures:  Other: LLIS 51.5    Treatments:  Therapeutic activity:   --Education:  clinical findings and POC; lymphedema diagnosis, prognosis, intervention, risk-reduction strategies   --reviewed self-care strategies such as wearing compression bra, diaphragmatic breathing, brief self-MLD    Assessment   PT Assessment Results:  (edema)  Rehab Prognosis: Good  Evaluation/Treatment Tolerance: Patient tolerated treatment well  Assessment Comment: Pt is a 54 y/o female with s/s of stage 1 secondary lymphedema of the left breast and stage 0 lymphedema of left UE due to breast cancer treatment. Pt would benefit from complete decongestive therapy including skin care, remedial exercises, manual lymph drainage and compression therapy to reduce pain and edema and improve QOL  High complexity due to patient's clinical presentation being unstable, with evolving and unpredictable characteristics, with comorbidities/complexities to include left breast cancer,, h/o DVT, CTS, cervical stenosis, hidadenitis suppurativa, TKA, cerebral aneurysm s/p clipping, all of which may negatively impact rehab tolerance and progression.     Plan:   Treatment/Interventions: Education/ Instruction,  Manual therapy, Self care/ home management, Taping techniques, Therapeutic activities, Therapeutic exercises (complete decongestive therapy including skin care, remedial exercises, manual lymph drainage and compression therapy)  PT Plan: Skilled PT  PT Frequency: 2 times per week  Duration: 6 weeks or 13 more visits  Number of Treatments Authorized: pending  Rehab Potential: Good  Plan of Care Agreement: Patient      Goals:   Active       PT Problem       Pt independent with lymphedema risk-reduction strategies       Start:  03/26/25    Expected End:  06/24/25            Pt independent with lymphedema self-care/self-management strategies including self-MLD       Start:  03/26/25    Expected End:  06/24/25            Pt to report 50% improvement in feeling of heaviness of left breast       Start:  03/26/25    Expected End:  06/24/25            Improve LLIS by at least 10 points       Start:  03/26/25    Expected End:  06/24/25

## 2025-03-28 ENCOUNTER — APPOINTMENT (OUTPATIENT)
Dept: PHYSICAL MEDICINE AND REHAB | Facility: CLINIC | Age: 55
End: 2025-03-28
Payer: MEDICARE

## 2025-03-31 ENCOUNTER — APPOINTMENT (OUTPATIENT)
Dept: PHYSICAL THERAPY | Facility: CLINIC | Age: 55
End: 2025-03-31
Payer: MEDICARE

## 2025-03-31 ENCOUNTER — PATIENT OUTREACH (OUTPATIENT)
Dept: PRIMARY CARE | Facility: CLINIC | Age: 55
End: 2025-03-31
Payer: MEDICARE

## 2025-03-31 DIAGNOSIS — I97.2 LYMPHEDEMA SYNDROME, POSTMASTECTOMY: Primary | ICD-10-CM

## 2025-03-31 NOTE — PROGRESS NOTES
Discharge Facility:[facility]  Discharge Diagnosis:Diarrhea  Admission Date:3/28/25  Discharge Date: 3/30/25    PCP Appointment Date:none made patient will call to schedule sent to pcp office  Specialist Appointment Date:   Hospital Encounter and Summary Linked: YesHospital Encounter   See discharge assessment below for further details  Yeast without pseudohyphae identified. See note.Wrap Up  Wrap Up Additional Comments: discused discharge patient stated that she is improving just feels a little tired. provided contact information encouraged call with questions. (3/31/2025  8:56 AM)    Engagement  Call Start Time: 0856 (3/31/2025  8:56 AM)    Medications  Medications reviewed with patient/caregiver?: Yes (vancomycin 25 klor con) (3/31/2025  8:56 AM)  Is the patient having any side effects they believe may be caused by any medication additions or changes?: No (3/31/2025  8:56 AM)  Does the patient have all medications ordered at discharge?: Yes (3/31/2025  8:56 AM)  Is the patient taking all medications as directed (includes completed medication regime)?: Yes (3/31/2025  8:56 AM)    Appointments  Does the patient have a primary care provider?: Yes (3/31/2025  8:56 AM)  Care Management Interventions: Advised patient to make appointment (3/31/2025  8:56 AM)    Self Management  What is the home health agency?: N/A (3/31/2025  8:56 AM)  Has home health visited the patient within 72 hours of discharge?: Not applicable (3/31/2025  8:56 AM)  What Durable Medical Equipment (DME) was ordered?: N/A (3/31/2025  8:56 AM)  Has all Durable Medical Equipment (DME) been delivered?: No (3/31/2025  8:56 AM)    Patient Teaching  Does the patient have access to their discharge instructions?: Yes (3/31/2025  8:56 AM)  Care Management Interventions: Reviewed instructions with patient (3/31/2025  8:56 AM)  What is the patient's perception of their health status since discharge?: Improving (3/31/2025  8:56 AM)  Is the patient/caregiver able to  teach back the hierarchy of who to call/visit for symptoms/problems? PCP, Specialist, Home Health nurse, Urgent Care, ED, 911: Yes (3/31/2025  8:56 AM)

## 2025-04-08 ENCOUNTER — APPOINTMENT (OUTPATIENT)
Dept: PRIMARY CARE | Facility: CLINIC | Age: 55
End: 2025-04-08
Payer: MEDICARE

## 2025-04-09 ENCOUNTER — TELEMEDICINE (OUTPATIENT)
Dept: PRIMARY CARE | Facility: CLINIC | Age: 55
End: 2025-04-09
Payer: MEDICARE

## 2025-04-09 DIAGNOSIS — Z86.718 HX OF DEEP VENOUS THROMBOSIS: ICD-10-CM

## 2025-04-09 DIAGNOSIS — K21.9 GASTROESOPHAGEAL REFLUX DISEASE WITHOUT ESOPHAGITIS: ICD-10-CM

## 2025-04-09 DIAGNOSIS — A04.72 C. DIFFICILE COLITIS: Primary | ICD-10-CM

## 2025-04-09 PROCEDURE — 99495 TRANSJ CARE MGMT MOD F2F 14D: CPT | Performed by: FAMILY MEDICINE

## 2025-04-09 RX ORDER — VANCOMYCIN HYDROCHLORIDE 25 MG/ML
125 KIT ORAL 4 TIMES DAILY
Qty: 140 ML | Refills: 0 | Status: SHIPPED | OUTPATIENT
Start: 2025-04-09 | End: 2025-04-16

## 2025-04-09 RX ORDER — VANCOMYCIN HYDROCHLORIDE 25 MG/ML
KIT ORAL
COMMUNITY
Start: 2025-03-30 | End: 2025-04-09 | Stop reason: SDUPTHER

## 2025-04-09 RX ORDER — POTASSIUM CHLORIDE 20 MEQ/1
40 TABLET, EXTENDED RELEASE ORAL
COMMUNITY
Start: 2025-03-31 | End: 2025-04-10

## 2025-04-09 NOTE — PROGRESS NOTES
Subjective   Patient ID: Sharonda Yeboah is a 54 y.o. female who presents for a virtual EPV.      HPI     The patient is on omeprazole for GERD, Eliquis, doxycyline for hidradenitis suppurativa and calcium. She is taking these medications as prescribed and is also being seen by Dermatology.     Patient was hospitalized from 3/28-3/30/2025 with diarrhea. She felt weak all over and dehydrated. Found to have C. diff. Pt thinks she was on some abx before diarrhea started. Vancomycin and potassium chloride ER was given.      She could not complete the vancomycin course as she left it over her cousin's house.  She took some of it. Diarrhea is better and her bowel movements are not watery any more.     She inquires about weight loss medications.     Review of Systems:  Constitutional: No fever or chills  Cardiovascular: no chest pain, no palpitations and no syncope.   Respiratory: no cough, no shortness of breath during exertion and no shortness of breath at rest.   Gastrointestinal: no abdominal pain, no nausea and no vomiting.  Neuro: No Headache, no dizziness    Physical Exam:   Constitutional: Alert and in no acute distress. Well developed, well nourished.   Head and Face: Head and face: Normal.     Eyes: Normal external exam.    Ears, Nose, Mouth, and Throat: External inspection of ears and nose: Normal.  Hearing: Normal.   Neck: No neck mass was observed. Supple.  Pulmonary: No respiratory distress.    Musculoskeletal: Range of motion: Normal.     Skin: Normal skin color and pigmentation, normal skin turgor, and no rash.    Neurologic: Coordination: Normal.    Psychiatric: Judgment and insight: Intact. Mood and affect: Normal.    Lab Results   Component Value Date    WBC 6.5 01/28/2025    HGB 10.1 (L) 01/28/2025    HCT 33.9 (L) 01/28/2025     (H) 01/28/2025    CHOL 196 11/08/2024    TRIG 114 11/08/2024    HDL 59.3 11/08/2024    ALT 9 01/28/2025    AST 11 01/28/2025     01/28/2025    K 4.1 01/28/2025    CL  105 01/28/2025    CREATININE 0.80 01/28/2025    BUN 9 01/28/2025    CO2 27 01/28/2025    TSH 2.52 11/08/2024    INR 1.2 (H) 01/05/2024    HGBA1C 5.3 11/08/2024       CT cardiac scoring wo IV contrast  Narrative: Interpreted By:  Kiley Chaney,   STUDY:  CT CARDIAC SCORING WO IV CONTRAST;  3/5/2025 10:22 am      INDICATION:  Signs/Symptoms:screening.      ,Z13.6 Encounter for screening for cardiovascular disorders      COMPARISON:  CT chest 02/26/2024  Mammography 10/10/2024      ACCESSION NUMBER(S):  [id number]      ORDERING CLINICIAN:  FRENCH VERONICA      TECHNIQUE:  Using prospective ECG gating, CT scan of the coronary arteries was  performed without intravenous contrast. Coronary calcium scoring  was  performed according to the method of Agatston.      FINDINGS:  The score and distribution of calcium in the coronary arteries is as  follows:      LM             0  LAD           0  LCx            0  RCA           0      Total         0      The visualized mid/lower ascending thoracic aorta measures 3.1 cm in  diameter. The heart is normal in size. No pericardial effusion is  present. Postsurgical changes are noted in the left breast. Edema and  overlying skin thickening of the left breast is noted. No gross  evidence of mediastinal or hilar lymphadenopathy or masses is  identified. The visualized lung fields are clear with no concerning  lung nodule. The visualized subdiaphragmatic structures are  unremarkable.      Impression: 1. Coronary artery calcium score of 0*.  2. Edema and overlying skin thickening of the left breast is noted,  please correlate with prior mammography.      *Coronary artery calcium scoring may be helpful in predicting the  risk for future coronary heart disease events.  According to the  American College of Cardiology Foundation Clinical Expert Consensus  Task Force, such testing provides important prognostic information in  patients with more than one coronary heart disease risk factor.  "The  coronary artery calcium score correlates with the annual risk of a  non-fatal myocardial infarction or coronary heart disease death.      Coronary artery score            Annual Risk      0-99                               0.4%  100-399                          1.3%  >400                              2.4%      These three \"breakpoints\" correspond to lower, intermediate and high  risk states for future coronary events.  Such information should be  used, along with appropriate clinical judgment, to make decisions  regarding the intensity of risk factor management strategies to treat  blood lipids and to modify other non-lipid coronary risk factors.      Reference: [place] P et al. Circulation.  2007; 115:402-426      MACRO:  None      Signed by: Kiley Chaney 3/6/2025 12:58 PM  Dictation workstation:   [id number]      Assessment/Plan   Assessment & Plan  C. difficile colitis  Continue vancomycin.  Orders:    vancomycin (Firvanq) 25 mg/mL oral solution; Take 5 mL (125 mg) by mouth 4 times a day for 7 days.    Hx of deep venous thrombosis  Continue Eliquis.        Gastroesophageal reflux disease without esophagitis   Hold on omeprazole for now and resume after C. diff is better.                This Medical recommendation has been made based on the Telephonic/Video conversation and the history is given by the patient, which I as a Physician and the patient also understand that there are limitations given the lack of an in-person Physical Exam. Patient will call back if symptoms don't improve.    Your yearly Physical is due in: Nov 2025  When you call the office for your yearly Physical, please ask them to inform me to order your blood work, so that you can get the fasting blood work before your appointment and we can discuss the results at your physical.      A Doctor is always available by phone when the office is closed. Please feel free to call for help with any problem that you feel shouldn't wait until the " office re-opens.     I, Pati Mchugh MD, attest that this note for 4/9/2025 accurately reflects documentation that my scribe Antonio De La Torre, made at my direction in my capacity as Pati Mchugh MD when I treated Sharonda Yeboah.    Scribe Attestation  By signing my name below, I, Faustino Zunigaibe   attest that this documentation has been prepared under the direction and in the presence of Pati Mchugh MD.

## 2025-04-11 ENCOUNTER — PATIENT OUTREACH (OUTPATIENT)
Dept: PRIMARY CARE | Facility: CLINIC | Age: 55
End: 2025-04-11
Payer: MEDICARE

## 2025-04-14 ENCOUNTER — OFFICE VISIT (OUTPATIENT)
Dept: DERMATOLOGY | Facility: HOSPITAL | Age: 55
End: 2025-04-14
Payer: MEDICARE

## 2025-04-14 DIAGNOSIS — L73.2 HIDRADENITIS SUPPURATIVA: Primary | ICD-10-CM

## 2025-04-14 DIAGNOSIS — L70.0 ACNE VULGARIS: ICD-10-CM

## 2025-04-14 DIAGNOSIS — L30.0 NUMMULAR DERMATITIS: ICD-10-CM

## 2025-04-14 PROCEDURE — 99214 OFFICE O/P EST MOD 30 MIN: CPT | Mod: GC,25 | Performed by: DERMATOLOGY

## 2025-04-14 PROCEDURE — 11900 INJECT SKIN LESIONS </W 7: CPT | Mod: GC | Performed by: STUDENT IN AN ORGANIZED HEALTH CARE EDUCATION/TRAINING PROGRAM

## 2025-04-14 PROCEDURE — 99214 OFFICE O/P EST MOD 30 MIN: CPT | Performed by: DERMATOLOGY

## 2025-04-14 PROCEDURE — 2500000004 HC RX 250 GENERAL PHARMACY W/ HCPCS (ALT 636 FOR OP/ED): Performed by: STUDENT IN AN ORGANIZED HEALTH CARE EDUCATION/TRAINING PROGRAM

## 2025-04-14 PROCEDURE — 11900 INJECT SKIN LESIONS </W 7: CPT | Performed by: STUDENT IN AN ORGANIZED HEALTH CARE EDUCATION/TRAINING PROGRAM

## 2025-04-14 RX ORDER — BENZOYL PEROXIDE 50 MG/ML
LIQUID TOPICAL
Qty: 240 G | Refills: 3 | Status: SHIPPED | OUTPATIENT
Start: 2025-04-14 | End: 2025-04-17 | Stop reason: SDUPTHER

## 2025-04-14 RX ORDER — CLINDAMYCIN PHOSPHATE 10 UG/ML
LOTION TOPICAL
Qty: 60 ML | Refills: 3 | Status: SHIPPED | OUTPATIENT
Start: 2025-04-14 | End: 2025-04-17 | Stop reason: SDUPTHER

## 2025-04-14 RX ORDER — TRETINOIN 0.25 MG/G
CREAM TOPICAL
Qty: 20 G | Refills: 3 | Status: SHIPPED | OUTPATIENT
Start: 2025-04-14

## 2025-04-14 RX ORDER — TRIAMCINOLONE ACETONIDE 40 MG/ML
40 INJECTION, SUSPENSION INTRA-ARTICULAR; INTRAMUSCULAR ONCE
Status: COMPLETED | OUTPATIENT
Start: 2025-04-14 | End: 2025-04-14

## 2025-04-14 RX ADMIN — TRIAMCINOLONE ACETONIDE 40 MG: 40 INJECTION, SUSPENSION INTRA-ARTICULAR; INTRAMUSCULAR at 12:04

## 2025-04-14 ASSESSMENT — DERMATOLOGY QUALITY OF LIFE (QOL) ASSESSMENT
WHAT SINGLE SKIN CONDITION LISTED BELOW IS THE PATIENT ANSWERING THE QUALITY-OF-LIFE ASSESSMENT QUESTIONS ABOUT: HIDRADENITIS SUPPURATIVA
DATE THE QUALITY-OF-LIFE ASSESSMENT WAS COMPLETED: 67309
RATE HOW EMOTIONALLY BOTHERED YOU ARE BY YOUR SKIN PROBLEM (FOR EXAMPLE, WORRY, EMBARRASSMENT, FRUSTRATION): 0 - NEVER BOTHERED
RATE HOW BOTHERED YOU ARE BY SYMPTOMS OF YOUR SKIN PROBLEM (EG, ITCHING, STINGING BURNING, HURTING OR SKIN IRRITATION): 0 - NEVER BOTHERED
ARE THERE EXCLUSIONS OR EXCEPTIONS FOR THE QUALITY OF LIFE ASSESSMENT: NO
RATE HOW BOTHERED YOU ARE BY EFFECTS OF YOUR SKIN PROBLEMS ON YOUR ACTIVITIES (EG, GOING OUT, ACCOMPLISHING WHAT YOU WANT, WORK ACTIVITIES OR YOUR RELATIONSHIPS WITH OTHERS): 0 - NEVER BOTHERED

## 2025-04-14 ASSESSMENT — PATIENT GLOBAL ASSESSMENT (PGA): PATIENT GLOBAL ASSESSMENT: PATIENT GLOBAL ASSESSMENT:  1 - CLEAR

## 2025-04-14 ASSESSMENT — DERMATOLOGY PATIENT ASSESSMENT
WHERE ARE THESE NEW OR CHANGING LESIONS LOCATED: LEGS
DO YOU HAVE IRREGULAR MENSTRUAL CYCLES: NO
ARE YOU TRYING TO GET PREGNANT: NO
DO YOU USE A TANNING BED: NO
ARE YOU ON BIRTH CONTROL: NO
HAVE YOU HAD OR DO YOU HAVE A STAPH INFECTION: NO
ARE YOU AN ORGAN TRANSPLANT RECIPIENT: NO
HAVE YOU HAD OR DO YOU HAVE VASCULAR DISEASE: NO
DO YOU HAVE ANY NEW OR CHANGING LESIONS: YES

## 2025-04-14 ASSESSMENT — ITCH NUMERIC RATING SCALE: HOW SEVERE IS YOUR ITCHING?: 0

## 2025-04-14 NOTE — PROGRESS NOTES
"Subjective     Sharonda Yeboah is a 54 y.o. female who presents for the following: Hidradenitis Suppurativa (legs).     Patient was last seen by us in 12/9/2024 for hidradenitis suppurativa follow up. Last visit, we started patient on Consentyx for HS, patient had initial improvement especially the inflamed nodules on the buttocks. She stopped Consentyx due to recent MRSE infection and left breast cellulitis requiring hospital admission and IV antibiotics. This was complicated by recent c.difficile infection. Patient also stopped Consentyx because she felt that it has lead to a facial rash that has improved a bit with hydrocortisone 2.5% cream.     Patient has follow up with lymphedema clinic the end of this month.    Patient also reports some rash flare up on the forearm and lower legs. Last visit she was prescribed clobetasol 0.05% ointment with improvement of similar lesions.    Review of Systems:  No other skin or systemic complaints other than what is documented elsewhere in the note.    The following portions of the chart were reviewed this encounter and updated as appropriate:          Skin Cancer History  No skin cancer on file.      Specialty Problems          Dermatology Problems    Hidradenitis suppurativa    Hidradenitis - Many years. Surgery R axilla. Doxy/Fabio \"keeps it at bay.\" Humira c/b psoriasiswithin 1 month, stopped.Crohn's disease - diagnosed/treated at [facility] but quiescent for years per patient report              Objective   Well appearing patient in no apparent distress; mood and affect are within normal limits.    A focused skin examination was performed. All findings within normal limits unless otherwise noted below.    Assessment/Plan   Skin Exam  1. HIDRADENITIS SUPPURATIVA  Gluteal Crease, Left Axilla, Left Medial Thigh, Right Axilla, Right Medial Thigh  Deep sinus tract formation and numerous scars in these locations, approx 2 deep tender nodules on right medial thigh today, marilou stage " 3  -Discussed nature of condition  -Discussed treatment options    Prior treatments have included  - topicals: benzoyl peroxide and clindamycin lotion  - multiple rounds of antibiotics:  rifampin/clindamycin, could not tolerate the entire treatment course. Re-started minocycline, stopped working, switched to doxycycline 10/2023.   - Also tried Humira years ago but d/c'ed due to psoriasis.   - Surgery years ago at [facility] with recurrence  - Proceed with caution for spironolactone due to history of breast cancer  - Trial of ILK 1/30/24, she did not think it caused improvement but did not come for follow up appts  - 12/27/24 started Consentyx, improved on gluteal cleft HS but discontinued after recent hospitalization for left breast cellulitis/MRSE bacteremia. Discussed infection is unlikely due to Consentyx and likely due to underlying lymphedema. Patient is seeing lymphedema clinic later this month. Patient also flaring of acne    Other work-up:  - 8/19/24 bacterial culture - normal demetra. Rx doxycycline PO  - 1/9/25 recent hospitalization for left breast cellulitis with MRSE bacteremia that may be due to contaminant s/p IV antibiotics, complicated by C.diff infection       Relevant labs:  HIV screening 10/2023  Hepatitis C Ab 10/2023  CBC 11/8/24  CMP 11/8/24  T-spot 12/19/24  Hep B 12/19/24    In the mean time, continue with ILK and patient awaiting to see plastic surgery, she is interested in surgical management again in addition to medical management. Doing both in conjunction will likely help more.     Discussed that Cosentyx has a very low risk of infection but given then severity of her infection and her other symptoms we will hold off on restarting. Infection was thought to be due to lymphedema. Patient is seeing lymphedema clinic later this month. Can consider restarting Consentyx or continue ILK as needed. Hesitant on antibiotic courses given recent history of c.diff  Intralesional injection - Right  Medial Thigh  Intralesional Injection:   Consent:     Consent obtained:  Verbal    Consent given by:  Patient    Risks discussed:  Poor cosmetic result, pain, infection and bleeding    Alternatives discussed:  No treatment  Pre-Procedure Details:     Prep Type:  Isopropyl alcohol  Procedure Details:   Injection:  Triamcinolone  Outcome: patient tolerated procedure well  Post-procedure details: sterile dressing applied and wound care instructions given  Dressing type: bandage   Comments:  A total of 1 ml of 40 mg/mL Kenalog were injected into 2 lesion(s)    Related Procedures  Follow Up In Dermatology - Established Patient  Follow Up In Dermatology - Established Patient  Related Medications  benzoyl peroxide (Benzac AC) 10 % external wash  Apply topically once daily. As directed leave on for 5 min then rinse  clindamycin (Cleocin T) 1 % lotion  Apply topically once daily in the morning. To sores or bumps  secukinumab (Cosentyx Pen) 150 mg/mL self-injector pen  Inject 300 mg (2 pens) under the skin every 4 weeks  triamcinolone acetonide (Kenalog-40) injection 40 mg    2. ACNE VULGARIS  Head - Anterior (Face)  Scattered comedones and inflammatory papulopustules.  -Discussed diagnosis of acne  -Discussed natural history of condition and expectations for treatment  -Recommend:  - - Start benzoyl peroxide 5% cleanser daily, leave on face for 2-3 minutes, rinse with water. Side effects of drying and irritation and bleaching of towels discussed  - Start clindamycin 1% lotion daily to face  - Start tretinoin 0.025% cream nightly, pea-sized amount to face, start every other night and increase to nightly as tolerated. Side effects of drying and irritation discussed   benzoyl peroxide 5 % external wash - Head - Anterior (Face)  Wash face once daily in the morning. Leave on face for 2-3 minutes, rinse with water    clindamycin (Cleocin T) 1 % lotion - Head - Anterior (Face)  Apply to face once daily in the morning.    tretinoin  (Retin-A) 0.025 % cream - Head - Anterior (Face)  Apply a small 1/2 pea sized amount to clean dry face at bedtime.  Start off 2x/week and increase as tolerated.  3. NUMMULAR DERMATITIS  Left Lower Leg - Anterior, Left forearm, Right Ankle - Anterior  Thin circular brown plaques symmetrically on lower legs bilaterally on shins and medial calves, left forearm. 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 Potentially starting Consentyx and stopping it has lead to re triggering psoriasis flare    Rx topical steroid - start clobetasol 0.05% ointment  Related Medications  clobetasol (Temovate) 0.05 % ointment  Apply topically 2 times a day. To areas of eczema on legs for up to 2 weeks. Wait at leas 2 weeks before repeating again as needed for flares. Not safe for face    Follow up in 3 months    Jane Pace MD   PGY4, Department of Dermatology    I saw and evaluated the patient. I personally obtained the key and critical portions of the history and physical exam or was physically present for key and critical portions performed by the resident/fellow. I reviewed the resident/fellow's documentation and discussed the patient with the resident/fellow. I agree with the resident/fellow's medical decision making as documented in the note and made changes where appropriate.    Jamia Carter MD

## 2025-04-17 ENCOUNTER — OFFICE VISIT (OUTPATIENT)
Dept: PRIMARY CARE | Facility: CLINIC | Age: 55
End: 2025-04-17
Payer: MEDICARE

## 2025-04-17 VITALS
SYSTOLIC BLOOD PRESSURE: 117 MMHG | DIASTOLIC BLOOD PRESSURE: 82 MMHG | WEIGHT: 202 LBS | OXYGEN SATURATION: 98 % | HEIGHT: 63 IN | BODY MASS INDEX: 35.79 KG/M2 | HEART RATE: 69 BPM

## 2025-04-17 DIAGNOSIS — B83.9 WORMS IN STOOL: Primary | ICD-10-CM

## 2025-04-17 PROCEDURE — 3008F BODY MASS INDEX DOCD: CPT | Performed by: FAMILY MEDICINE

## 2025-04-17 PROCEDURE — 1036F TOBACCO NON-USER: CPT | Performed by: FAMILY MEDICINE

## 2025-04-17 PROCEDURE — 99213 OFFICE O/P EST LOW 20 MIN: CPT | Performed by: FAMILY MEDICINE

## 2025-04-17 NOTE — PROGRESS NOTES
"Subjective   Patient ID: Sharonda Yeboah is a 54 y.o. female who presents for Stool Concerns.    HPI     The patient is on omeprazole for GERD, Eliquis, doxycyline for hidradenitis suppurativa and calcium. She is taking these medications as prescribed and is also being seen by Dermatology. She was hospitalized in 3/2025 with diarrhea, weakness and dehydration and was found to have C. diff. She was treated with vancomycin and potassium chloride.    Today she presents with concerns over bowel movements having parasites.  She brought images of her stools today to the clinic. She denies eating different or travelling outside.  Her diarrhea has cleared.  She is holding omeprazole for now and will resume now.    Review of Systems  Constitutional: No fever or chills  Cardiovascular: no chest pain, no palpitations and no syncope.   Respiratory: no cough, no shortness of breath during exertion and no shortness of breath at rest.   Gastrointestinal: no abdominal pain, no nausea and no vomiting. +stool issues  Neuro: No Headache, no dizziness    Objective   /82   Pulse 69   Ht 1.6 m (5' 2.99\")   Wt 91.6 kg (202 lb)   SpO2 98%   BMI 35.79 kg/m²     Physical Exam  Constitutional: Alert and in no acute distress. Well developed, well nourished  Head and Face: Head and face: Normal.    Cardiovascular: Heart rate and rhythm were normal, normal S1 and S2. No peripheral edema.   Pulmonary: No respiratory distress. Clear bilateral breath sounds.  Musculoskeletal: Gait and station: Normal. Muscle strength/tone: Normal.   Skin: Normal skin color and pigmentation, normal skin turgor, and no rash.    Psychiatric: Judgment and insight: Intact. Mood and affect: Normal.      Lab Results   Component Value Date    WBC 6.5 01/28/2025    HGB 10.1 (L) 01/28/2025    HCT 33.9 (L) 01/28/2025     (H) 01/28/2025    CHOL 196 11/08/2024    TRIG 114 11/08/2024    HDL 59.3 11/08/2024    ALT 9 01/28/2025    AST 11 01/28/2025     " 01/28/2025    K 4.1 01/28/2025     01/28/2025    CREATININE 0.80 01/28/2025    BUN 9 01/28/2025    CO2 27 01/28/2025    TSH 2.52 11/08/2024    INR 1.2 (H) 01/05/2024    HGBA1C 5.3 11/08/2024       CT cardiac scoring wo IV contrast  Narrative: Interpreted By:  Kiley Chaney,   STUDY:  CT CARDIAC SCORING WO IV CONTRAST;  3/5/2025 10:22 am      INDICATION:  Signs/Symptoms:screening.      ,Z13.6 Encounter for screening for cardiovascular disorders      COMPARISON:  CT chest 02/26/2024  Mammography 10/10/2024      ACCESSION NUMBER(S):  [id number]      ORDERING CLINICIAN:  FRENCH VERONICA      TECHNIQUE:  Using prospective ECG gating, CT scan of the coronary arteries was  performed without intravenous contrast. Coronary calcium scoring  was  performed according to the method of Agatston.      FINDINGS:  The score and distribution of calcium in the coronary arteries is as  follows:      LM             0  LAD           0  LCx            0  RCA           0      Total         0      The visualized mid/lower ascending thoracic aorta measures 3.1 cm in  diameter. The heart is normal in size. No pericardial effusion is  present. Postsurgical changes are noted in the left breast. Edema and  overlying skin thickening of the left breast is noted. No gross  evidence of mediastinal or hilar lymphadenopathy or masses is  identified. The visualized lung fields are clear with no concerning  lung nodule. The visualized subdiaphragmatic structures are  unremarkable.      Impression: 1. Coronary artery calcium score of 0*.  2. Edema and overlying skin thickening of the left breast is noted,  please correlate with prior mammography.      *Coronary artery calcium scoring may be helpful in predicting the  risk for future coronary heart disease events.  According to the  American College of Cardiology Foundation Clinical Expert Consensus  Task Force, such testing provides important prognostic information in  patients with more than one  "coronary heart disease risk factor. The  coronary artery calcium score correlates with the annual risk of a  non-fatal myocardial infarction or coronary heart disease death.      Coronary artery score            Annual Risk      0-99                               0.4%  100-399                          1.3%  >400                              2.4%      These three \"breakpoints\" correspond to lower, intermediate and high  risk states for future coronary events.  Such information should be  used, along with appropriate clinical judgment, to make decisions  regarding the intensity of risk factor management strategies to treat  blood lipids and to modify other non-lipid coronary risk factors.      Reference: [place] P et al. Circulation.  2007; 115:402-426      MACRO:  None      Signed by: Kiley Chaney 3/6/2025 12:58 PM  Dictation workstation:   [id number]      Assessment/Plan   Assessment & Plan  Worms in stool  Ordered 3 tests.   Orders:    Ova/Para + Giardia/Cryptosporidium Antigen; Standing      Resume omeprazole now as C. diff has resolved.           Your yearly Physical is due in: Nov 2025  When you call the office for your yearly Physical, please ask them to inform me to order your blood work, so that you can get the fasting blood work before your appointment and we can discuss the results at your physical.      Please call me if any questions arise from now until your next visit. I will call you after I am done seeing patients. A Doctor is always available by phone when the office is closed. Please feel free to call for help with any problem that you feel shouldn't wait until the office re-opens.     Scribe Attestation  By signing my name below, IAntonio Scribe   attest that this documentation has been prepared under the direction and in the presence of Pati Mchugh MD.    "

## 2025-04-18 ENCOUNTER — TELEPHONE (OUTPATIENT)
Dept: DERMATOLOGY | Facility: CLINIC | Age: 55
End: 2025-04-18
Payer: MEDICARE

## 2025-04-18 DIAGNOSIS — B83.9 WORMS IN STOOL: ICD-10-CM

## 2025-04-18 NOTE — TELEPHONE ENCOUNTER
Pt left message on Thurs that her tretinoin needed prior auth and to call her pharmacy so that can be done ?? I returned call to pt left  that her tretinoin has been approved on 04/16/25 , and if she has any other questions to give us a callback ..

## 2025-04-19 DIAGNOSIS — B83.9 WORMS IN STOOL: ICD-10-CM

## 2025-04-22 ENCOUNTER — APPOINTMENT (OUTPATIENT)
Dept: PHYSICAL MEDICINE AND REHAB | Facility: CLINIC | Age: 55
End: 2025-04-22
Payer: MEDICARE

## 2025-04-28 ENCOUNTER — PATIENT OUTREACH (OUTPATIENT)
Dept: PRIMARY CARE | Facility: CLINIC | Age: 55
End: 2025-04-28
Payer: MEDICARE

## 2025-04-30 ENCOUNTER — TREATMENT (OUTPATIENT)
Dept: PHYSICAL THERAPY | Facility: CLINIC | Age: 55
End: 2025-04-30
Payer: MEDICARE

## 2025-04-30 DIAGNOSIS — I97.2 LYMPHEDEMA SYNDROME, POSTMASTECTOMY: Primary | ICD-10-CM

## 2025-04-30 PROCEDURE — 97140 MANUAL THERAPY 1/> REGIONS: CPT | Mod: GP

## 2025-04-30 ASSESSMENT — PAIN - FUNCTIONAL ASSESSMENT: PAIN_FUNCTIONAL_ASSESSMENT: 0-10

## 2025-04-30 ASSESSMENT — PAIN SCALES - GENERAL: PAINLEVEL_OUTOF10: 0 - NO PAIN

## 2025-04-30 NOTE — PROGRESS NOTES
Physical Therapy Treatment    Patient Name: Sharonda Yeboah  MRN: [id number]  Today's Date: 4/30/2025  Time Calculation  Start Time: 1115  Stop Time: 1200  Time Calculation (min): 45 min  PT Therapeutic Procedures Time Entry  Manual Therapy Time Entry: 45    Insurance:  Visit number: 2 of 7  Authorization info: Auth [id number] 6 visits 4/30-6/28/2025  Insurance Type: Payor: [employer] MEDICARE / Plan: [employer] DUAL [employer]VANTAGE / Product Type: *No Product type* /     Current Problem   1. Lymphedema syndrome, postmastectomy  Follow Up In Physical Therapy          Subjective   No heaviness today  in left breast but feels a lot larger than right.  Denies pain    Precautions  Medical Precautions: Lymphedema precautions    Pain Assessment: 0-10  0-10 (Numeric) Pain Score: 0 - No pain  Post Treatment Pain Level 0    Objective   Left breast soft non-pitting edema    Treatments:  Manual:   Manual lymph drainage secondary left UE protocol with emphasis on breast/truncal decongestion.    Assessment   PT Assessment  Evaluation/Treatment Tolerance: Patient tolerated treatment well  Assessment Comment: Left breast visibly larger that right but soft with mild fibrosis. Emphasis today on decongrestion of left breast.    Plan:   Cont with MLD     OP EDUCATION:  Outpatient Education  Individual(s) Educated: Patient  Education Provided: Lymphedema care    Goals:   Active       PT Problem       Pt independent with lymphedema risk-reduction strategies (Progressing)       Start:  03/26/25    Expected End:  06/24/25            Pt independent with lymphedema self-care/self-management strategies including self-MLD (Progressing)       Start:  03/26/25    Expected End:  06/24/25            Pt to report 50% improvement in feeling of heaviness of left breast (Progressing)       Start:  03/26/25    Expected End:  06/24/25            Improve LLIS by at least 10 points (Progressing)       Start:  03/26/25    Expected End:  06/24/25

## 2025-05-02 ENCOUNTER — TREATMENT (OUTPATIENT)
Dept: PHYSICAL THERAPY | Facility: CLINIC | Age: 55
End: 2025-05-02
Payer: MEDICARE

## 2025-05-02 DIAGNOSIS — I97.2 LYMPHEDEMA SYNDROME, POSTMASTECTOMY: Primary | ICD-10-CM

## 2025-05-02 PROCEDURE — 97140 MANUAL THERAPY 1/> REGIONS: CPT | Mod: GP

## 2025-05-02 ASSESSMENT — PAIN - FUNCTIONAL ASSESSMENT: PAIN_FUNCTIONAL_ASSESSMENT: 0-10

## 2025-05-02 ASSESSMENT — PAIN SCALES - GENERAL: PAINLEVEL_OUTOF10: 4

## 2025-05-02 NOTE — PROGRESS NOTES
Physical Therapy Treatment    Patient Name: Sharonda Yeboah  MRN: [id number]  Today's Date: 5/2/2025  Time Calculation  Start Time: 0930  Stop Time: 1020  Time Calculation (min): 50 min  PT Therapeutic Procedures Time Entry  Manual Therapy Time Entry: 45    Insurance:  Visit number: 3 of 6  Authorization info: Auth Rcvd 6 visits 4/30-6/28/2025  Insurance Type: Payor: [employer] MEDICARE / Plan: [employer]UAL [employer]VANTAGE / Product Type: *No Product type* /     Current Problem   1. Lymphedema syndrome, postmastectomy  Follow Up In Physical Therapy          Subjective   Pt reports left breast felt a little better after last treatment    Precautions  Medical Precautions: Lymphedema precautions    Pain Assessment: 0-10  0-10 (Numeric) Pain Score: 4  Pain Location: Back  Pain Orientation: Lower  Post Treatment Pain Level 4    Objective   Left breast soft non-pitting    Treatments:  Manual:    Manual lymph drainage secondary left UE protocol with emphasis on breast/truncal decongestion.     Assessment   PT Assessment  Evaluation/Treatment Tolerance: Patient tolerated treatment well  Assessment Comment: PLeft breast appears softer today with mild fibrosis on underside.    Plan:    Cont with manual therapy    OP EDUCATION:  Outpatient Education  Individual(s) Educated: Patient  Education Provided: Lymphedema care    Goals:   Physical Therapy - Physical Therapy - November 2023 Problems       Physical Therapy - Physical Therapy - November 2023 Problems (Active)       PT Problem       PT Goal 1 (Progressing)       Start:  11/03/23    Expected End:  07/31/25       Pt independent with lymphedema risk-reduction strategies         PT Goal 2 (Progressing)       Start:  11/03/23    Expected End:  07/31/25       Improve LLIS to no more than 15% impairment         PT Goal 3 (Progressing)       Start:  11/03/23    Expected End:  07/31/25       Pt independent with lymphedema self-care/self-management strategies         PT Goal 4 (Progressing)        Start:  11/03/23    Expected End:  07/31/25       Reduce frequency and intensity of pain in left breast by at least 60%              PT eval lymphedema 3/26/2025 Problems       PT eval lymphedema 3/26/2025 Problems (Active)       PT Problem       Pt independent with lymphedema risk-reduction strategies (Progressing)       Start:  03/26/25    Expected End:  06/24/25            Pt independent with lymphedema self-care/self-management strategies including self-MLD (Progressing)       Start:  03/26/25    Expected End:  06/24/25            Pt to report 50% improvement in feeling of heaviness of left breast (Progressing)       Start:  03/26/25    Expected End:  06/24/25            Improve LLIS by at least 10 points (Progressing)       Start:  03/26/25    Expected End:  06/24/25

## 2025-05-05 ENCOUNTER — APPOINTMENT (OUTPATIENT)
Dept: PHYSICAL THERAPY | Facility: CLINIC | Age: 55
End: 2025-05-05
Payer: MEDICARE

## 2025-05-05 DIAGNOSIS — I97.2 LYMPHEDEMA SYNDROME, POSTMASTECTOMY: Primary | ICD-10-CM

## 2025-05-08 ENCOUNTER — SPECIALTY PHARMACY (OUTPATIENT)
Dept: PHARMACY | Facility: CLINIC | Age: 55
End: 2025-05-08

## 2025-05-12 ENCOUNTER — TREATMENT (OUTPATIENT)
Dept: PHYSICAL THERAPY | Facility: CLINIC | Age: 55
End: 2025-05-12
Payer: MEDICARE

## 2025-05-12 DIAGNOSIS — Z86.718 HX OF DEEP VENOUS THROMBOSIS: ICD-10-CM

## 2025-05-12 DIAGNOSIS — I97.2 LYMPHEDEMA SYNDROME, POSTMASTECTOMY: Primary | ICD-10-CM

## 2025-05-12 PROCEDURE — 97140 MANUAL THERAPY 1/> REGIONS: CPT | Mod: GP

## 2025-05-12 ASSESSMENT — PAIN - FUNCTIONAL ASSESSMENT: PAIN_FUNCTIONAL_ASSESSMENT: 0-10

## 2025-05-12 ASSESSMENT — PAIN SCALES - GENERAL: PAINLEVEL_OUTOF10: 4

## 2025-05-13 ENCOUNTER — TELEPHONE (OUTPATIENT)
Dept: DERMATOLOGY | Facility: HOSPITAL | Age: 55
End: 2025-05-13
Payer: MEDICARE

## 2025-05-13 DIAGNOSIS — K21.9 GASTROESOPHAGEAL REFLUX DISEASE WITHOUT ESOPHAGITIS: ICD-10-CM

## 2025-05-13 RX ORDER — OMEPRAZOLE 40 MG/1
40 CAPSULE, DELAYED RELEASE ORAL
Qty: 90 CAPSULE | Refills: 1 | Status: SHIPPED | OUTPATIENT
Start: 2025-05-13

## 2025-05-16 ENCOUNTER — SPECIALTY PHARMACY (OUTPATIENT)
Dept: PHARMACY | Facility: CLINIC | Age: 55
End: 2025-05-16

## 2025-05-21 ENCOUNTER — APPOINTMENT (OUTPATIENT)
Dept: PHYSICAL THERAPY | Facility: CLINIC | Age: 55
End: 2025-05-21
Payer: MEDICARE

## 2025-05-21 DIAGNOSIS — I97.2 LYMPHEDEMA SYNDROME, POSTMASTECTOMY: Primary | ICD-10-CM

## 2025-05-22 ENCOUNTER — APPOINTMENT (OUTPATIENT)
Dept: HEMATOLOGY/ONCOLOGY | Facility: CLINIC | Age: 55
End: 2025-05-22
Payer: MEDICARE

## 2025-05-23 ENCOUNTER — TREATMENT (OUTPATIENT)
Dept: PHYSICAL THERAPY | Facility: CLINIC | Age: 55
End: 2025-05-23
Payer: MEDICARE

## 2025-05-23 DIAGNOSIS — I97.2 LYMPHEDEMA SYNDROME, POSTMASTECTOMY: Primary | ICD-10-CM

## 2025-05-23 PROCEDURE — 97140 MANUAL THERAPY 1/> REGIONS: CPT | Mod: GP

## 2025-05-23 ASSESSMENT — PAIN SCALES - GENERAL: PAINLEVEL_OUTOF10: 0 - NO PAIN

## 2025-05-23 ASSESSMENT — PAIN - FUNCTIONAL ASSESSMENT: PAIN_FUNCTIONAL_ASSESSMENT: 0-10

## 2025-05-23 NOTE — PROGRESS NOTES
Physical Therapy Treatment    Patient Name: Sharonda Yeboah  MRN: [id number]  Today's Date: 5/23/2025  Time Calculation  Start Time: 1420  Stop Time: 1505  Time Calculation (min): 45 min  PT Therapeutic Procedures Time Entry  Manual Therapy Time Entry: 40    Insurance:  Visit number: 5 of 6  Authorization info: Beth Rcvd 6 visits 4/30-6/28/2025   Insurance Type: Payor: [employer]ICARE / Plan: [employer] DUAL ADVANTAGE / Product Type: *No Product type* /     Current Problem   1. Lymphedema syndrome, postmastectomy  Follow Up In Physical Therapy          Subjective   Pt reports she has not felt heaviness in her left breast in a while. Wear compression bra daily    Precautions  Medical Precautions: Lymphedema precautions    Pain Assessment: 0-10  0-10 (Numeric) Pain Score: 0 - No pain  Post Treatment Pain Level 0    Objective   Left breast soft, no fibrosis noted    Treatments:  Manual:      Manual lymph drainage secondary left UE protocol with emphasis on breast/truncal decongestion.     Assessment   PT Assessment  Evaluation/Treatment Tolerance: Patient tolerated treatment well  Assessment Comment: Pt doing well.  Reduced edema oted in left breast along with softening of tissue.  Pt compliant with use of compression.  Pt is progressing towards goals    Plan:    Reassess next visit    OP EDUCATION:  Outpatient Education  Individual(s) Educated: Patient  Education Provided: Lymphedema care    Goals:   Active       PT Problem       Pt independent with lymphedema risk-reduction strategies (Met)       Start:  03/26/25    Expected End:  06/24/25    Resolved:  05/23/25         Pt independent with lymphedema self-care/self-management strategies including self-MLD (Progressing)       Start:  03/26/25    Expected End:  06/24/25            Pt to report 50% improvement in feeling of heaviness of left breast (Progressing)       Start:  03/26/25    Expected End:  06/24/25            Improve LLIS by at least 10 points (Progressing)        Start:  03/26/25    Expected End:  06/24/25

## 2025-05-29 ENCOUNTER — TREATMENT (OUTPATIENT)
Dept: PHYSICAL THERAPY | Facility: CLINIC | Age: 55
End: 2025-05-29
Payer: MEDICARE

## 2025-05-29 DIAGNOSIS — I97.2 LYMPHEDEMA SYNDROME, POSTMASTECTOMY: Primary | ICD-10-CM

## 2025-05-29 PROCEDURE — 97140 MANUAL THERAPY 1/> REGIONS: CPT | Mod: GP

## 2025-05-29 ASSESSMENT — PAIN - FUNCTIONAL ASSESSMENT: PAIN_FUNCTIONAL_ASSESSMENT: 0-10

## 2025-05-29 ASSESSMENT — PAIN SCALES - GENERAL: PAINLEVEL_OUTOF10: 0 - NO PAIN

## 2025-05-29 NOTE — PROGRESS NOTES
Physical Therapy Treatment    Patient Name: Sharonda Yeboah  MRN: [id number]  Today's Date: 5/29/2025  Time Calculation  Start Time: 1015  Stop Time: 1055  Time Calculation (min): 40 min  PT Therapeutic Procedures Time Entry  Manual Therapy Time Entry: 38    Insurance:  Visit number: 6 of 7  Authorization info: Auth Rcvd 6 visits 4/30-6/28/2025   Insurance Type: Payor: [employer]ICARE / Plan: [employer] DUAL ADVANTAGE / Product Type: *No Product type* /     Current Problem   1. Lymphedema syndrome, postmastectomy  Follow Up In Physical Therapy          Subjective   Denies pain. Left breast feeling less heavy    Precautions  Medical Precautions: Lymphedema precautions    Pain Assessment: 0-10  0-10 (Numeric) Pain Score: 0 - No pain  Post Treatment Pain Level 0    Objective   Left breast soft non-pitting edema present, slight peau d'orange texture, skin thickening noted left lateral breast    Treatments:  Manual:       Manual lymph drainage secondary left UE protocol with emphasis on breast/truncal decongestion.     Assessment   PT Assessment  Evaluation/Treatment Tolerance: Patient tolerated treatment well  Assessment Comment: left breast improving but still edema present lateral aspect with mild fibrosis.  Pt would benefit from continued skilled PT to address this area to prevent worsening of condition    Plan:    Cont with MLD; re-assess next visit    OP EDUCATION:  Outpatient Education  Individual(s) Educated: Patient  Education Provided: Lymphedema care    Goals:   Active       PT Problem       Pt independent with lymphedema risk-reduction strategies (Met)       Start:  03/26/25    Expected End:  06/24/25    Resolved:  05/23/25         Pt independent with lymphedema self-care/self-management strategies including self-MLD (Progressing)       Start:  03/26/25    Expected End:  06/24/25            Pt to report 50% improvement in feeling of heaviness of left breast (Progressing)       Start:  03/26/25    Expected End:   06/24/25            Improve LLIS by at least 10 points (Progressing)       Start:  03/26/25    Expected End:  06/24/25

## 2025-06-02 ENCOUNTER — DOCUMENTATION (OUTPATIENT)
Dept: PHYSICAL THERAPY | Facility: CLINIC | Age: 55
End: 2025-06-02
Payer: MEDICARE

## 2025-06-02 ENCOUNTER — APPOINTMENT (OUTPATIENT)
Dept: PHYSICAL THERAPY | Facility: CLINIC | Age: 55
End: 2025-06-02
Payer: MEDICARE

## 2025-06-02 DIAGNOSIS — I97.2 LYMPHEDEMA SYNDROME, POSTMASTECTOMY: Primary | ICD-10-CM

## 2025-06-02 NOTE — PROGRESS NOTES
Physical Therapy                 Therapy Communication Note    Patient Name: Sharonda Yeboah  MRN: [id number]  Department:   Room: Room/bed info not found  Today's Date: 6/2/2025     Discipline: Physical Therapy    Missed Visit:   no reason given    Missed Visit Reason:      Missed Time: Cancel    Comment:

## 2025-06-10 ENCOUNTER — CONSULT (OUTPATIENT)
Dept: PHYSICAL MEDICINE AND REHAB | Facility: CLINIC | Age: 55
End: 2025-06-10
Payer: MEDICARE

## 2025-06-10 VITALS
DIASTOLIC BLOOD PRESSURE: 83 MMHG | TEMPERATURE: 98 F | RESPIRATION RATE: 20 BRPM | HEART RATE: 78 BPM | SYSTOLIC BLOOD PRESSURE: 127 MMHG

## 2025-06-10 DIAGNOSIS — I97.2 LYMPHEDEMA SYNDROME, POSTMASTECTOMY: ICD-10-CM

## 2025-06-10 DIAGNOSIS — C50.912 BREAST CANCER METASTASIZED TO AXILLARY LYMPH NODE, LEFT: ICD-10-CM

## 2025-06-10 DIAGNOSIS — Z86.718 HX OF DEEP VENOUS THROMBOSIS: ICD-10-CM

## 2025-06-10 DIAGNOSIS — I89.0 LYMPHEDEMA OF LEFT ARM: ICD-10-CM

## 2025-06-10 DIAGNOSIS — I89.0 LYMPHEDEMA OF BREAST: Primary | ICD-10-CM

## 2025-06-10 DIAGNOSIS — C77.3 BREAST CANCER METASTASIZED TO AXILLARY LYMPH NODE, LEFT: ICD-10-CM

## 2025-06-10 DIAGNOSIS — Z92.3 S/P RADIATION THERAPY: ICD-10-CM

## 2025-06-10 PROCEDURE — 99205 OFFICE O/P NEW HI 60 MIN: CPT | Performed by: PHYSICAL MEDICINE & REHABILITATION

## 2025-06-10 PROCEDURE — 99215 OFFICE O/P EST HI 40 MIN: CPT | Performed by: PHYSICAL MEDICINE & REHABILITATION

## 2025-06-10 ASSESSMENT — PAIN SCALES - GENERAL: PAINLEVEL_OUTOF10: 5

## 2025-06-10 NOTE — PROGRESS NOTES
[facility]          HISTORY   55 y.o. F w pmh of left grade 3, ER/NM-negative Her2-negative (1+ IHC) breast cancer. A left axillary LN was positive for metastatic carcinoma dx in 10/2022 sp neoadjuvant chemotherapy complicated by small bowel obstruction completed 4/2023 followed by left lumpectomy 5/2023 and then 3 doses of adjuvant pembrolizumab also complicated by GI issues.  This was followed  by radiation to left breast., SCV of a dose of 42.56 Gy with additional 10 Gy to the tumor bed for a total of 52.56 Gy completed 8/2023. 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  Also ho of hydradenitis suppurative w multiple infections.      Ongoing left breast edema and discomfort noted, improved with PT. However, did have a breast infection in Jan 2025, would like to go back to PT or see a PM&R/plastic surgeon for further considerations of management   Notes some fatigue, but able to perform her usual activities.      States noticed swelling after surgey, dx w seroma needed to be drained. Breast swelling got worse during radiation.  She started lymphedema after radiuation and she has been doing exercises which was helping.   She got MRSA skin infection, cellulitus, had severe pain and made swelling worse.  Had iv vanc and was admitted 3/2025 and then restarted lymphedeam therapy again.    She is doing MLD, learning how to do it. She has compression bras from [employer] and was told its fine.   She also tried to call [employer], was told that they dont accept anthem.  Wears them regularly.     Few times a week has sharp pain in left breast. Sometimes bothered at night.  Tylenol doesn't help. Can't take aleve due to gi issues and eliquis.    Imaging  Reviewed 06/10/25 w patient and personally     Breast us ccf  1/2205  US CHEST WALL/SOFT TISSUE  Order: [id number]  Impression    IMPRESSION:    Emergent survey images of the left breast were  performed to look for an  abscess.  No discrete abscess is present.  Soft tissue swelling suggesting cellulitis.  This study is inadequate to assess for breast malignancy. Dedicated  breast workup should BE obtained as clinically warranted.   Medical History[1]  Surgical History[2]  Medical History[3]  Family History[4]  Social History     Socioeconomic History    Marital status: Single     Spouse name: Not on file    Number of children: 4    Years of education: Not on file    Highest education level: Not on file   Occupational History    Not on file   Tobacco Use    Smoking status: Former     Current packs/day: 0.00     Average packs/day: 0.3 packs/day for 5.0 years (1.3 ttl pk-yrs)     Types: Cigarettes     Start date: 10/1/2017     Quit date: 10/1/2022     Years since quittin.6    Smokeless tobacco: Never   Substance and Sexual Activity    Alcohol use: Yes     Comment: 1 glass of wine 3 times a year    Drug use: Never    Sexual activity: Not Currently     Birth control/protection: Post-menopausal   Other Topics Concern    Not on file   Social History Narrative    Not on file     Social Drivers of Health     Financial Resource Strain: Low Risk  (2023)    Received from [facility]    Overall Financial Resource Strain (CARDIA)     Difficulty of Paying Living Expenses: Not hard at all   Food Insecurity: No Food Insecurity (3/29/2025)    Received from [facility]    Hunger Vital Sign     Worried About Running Out of Food in the Last Year: Never true     Ran Out of Food in the Last Year: Never true   Transportation Needs: No Transportation Needs (3/29/2025)    Received from [facility]    PRAPARE - Transportation     Lack of Transportation (Medical): No     Lack of Transportation (Non-Medical): No   Physical Activity: Sufficiently Active (3/8/2024)    Received from [facility]    Exercise Vital Sign     Days of Exercise per Week: 4 days     Minutes of Exercise per Session: 60 min   Stress:  No Stress Concern Present (3/8/2024)    Received from [facility]    [employer] [place] [employer] - Occupational Stress Questionnaire     Feeling of Stress : Only a little   Social Connections: Moderately Isolated (3/8/2024)    Received from [facility]    Social Connection and Isolation Panel [NHANES]     Frequency of Communication with Friends and Family: More than three times a week     Frequency of Social Gatherings with Friends and Family: Once a week     Attends [employer] Services: More than 4 times per year     Active Member of Clubs or Organizations: No     Attends Club or Organization Meetings: Never     Marital Status: Never    Intimate Partner Violence: Not on file   Housing Stability: Low Risk  (3/29/2025)    Received from [facility]    Housing Stability Vital Sign     Unable to Pay for Housing in the Last Year: No     Number of Times Moved in the Last Year: 1     Homeless in the Last Year: No              REVIEW OF SYSTEMS      Pain: Denies  Sleep: WNL in a regular bed  Mood: WNL  Appetite/Nutrition: WNL  Motor: Denies weakness  Sensory: Denies numbness or tingling  Skin: No ulcerations, infection or drainage  Chest Pain: Denies  Dyspnea: Denies  Nausea/Vomiting: Denies  Fatigue (include 1-10 rating if present): Denies        PHYSICAL EXAMINATION      General: Alert, normal build, no acute distress  /83 (BP Location: Right arm, Patient Position: Sitting)   Pulse 78   Temp 36.7 °C (98 °F)   Resp 20     Affect: Appropriate  Skin: No redness or streaking, no open areas. No drainage. No discoloration.  Left axillar scar tissue from hydradenitis  HEENT: WNL.  Heart: RRR  Lungs: Respirations unlabored.  Extremities: Normal contour. Stemmer's negative.     Arm Circumferences (cm):     6/10/25    Index L 6.5    R 7    MCP's L 18    R 18.5    Below elbow L(10 cm) 24    R 25    Below elbow L (5cm) 26.5    R 27    Above elbow L (10 cm) 33    R 36.5    Left wrist 15.5   "  Right wrist       16  Breast circumference:      114cm  (10cm down from the top of R the breast and across); 6/10/2025           Neck: Supple  Back: Normal contour  Motor: 5/5 strength all extremities  Relatively preserved rom in internal rotation left breast, limited L shoulder abduction/flexion 150 degrees passively  Left breast lymphedema  Hyper pigmentation  Sensory: Intact LT  Cognition: Grossly intact  Reflexes: 1-2+ bilaterally  Coordination: Intact rapid alternating movements  Gait: WNL    Initial PROMIS CF Brief 3D Profile  6/12/2025    Physical Function Short Form:  Are you able to go for a walk of at least 15 minutes? With a little difficulty - 4  Are you able to go up and down stairs at a normal pace? With a little difficulty - 4  Are you able to sit on and get up from a toilet? Without any difficulty - 5  Are you able to dress yourself, including tying shoelaces and buttoning your clothes? With a little difficulty - 4  Are you able to carry a laundry basket up a flight of stairs? With some difficulty - 3    Does your health now limit you in doing heavy work around the house like scrubbing floors, or lifting or moving heavy furniture? Somewhat - 3    Score: 23/30    Fatigue Short Form: In the past 7 days...  How often did you have trouble finishing things because of your fatigue? Sometimes - 3  How often did your fatigue make it difficult to plan activities ahead of time? Sometimes - 3  How often did you have to limit your social activities because of your fatigue? Sometimes - 3    Score 9/15    Social Participation Short Form:  I have trouble doing all of my regular leisure activities with others, Sometimes - 3  I have trouble doing all of the work that is really important to me (including work at home), Sometimes - 3  I have trouble doing all of the family activities that I want to do, Sometimes - 3    Score 9/15    How would you rate your PAIN on average? 0 is \"no pain\" and 10 is \"worst pain " "imaginable pain\"   5/10    How would you rate your overall DISTRESS on average? 0 is \"no distress\" and 10 is \"extreme distress\"   7/10       IMPRESSION  55 y.o. F w pmh of left grade 3, ER/IL-negative Her2-negative (1+ IHC) breast cancer. A left axillary LN was positive for metastatic carcinoma dx in 10/2022 sp neoadjuvant chemotherapy complicated by small bowel obstruction completed 4/2023 followed by left lumpectomy 5/2023 and then 3 doses of adjuvant pembrolizumab also complicated by GI issues.  This was followed  by radiation to left breast., SCV of a dose of 42.56 Gy with additional 10 Gy to the tumor bed for a total of 52.56 Gy completed 8/2023. 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  She is presenting with persistent breast lymphedema and breast pain now worsened after left breast cellulitis and worsening lymphedema 3/2025.  Also ho of hydradenitis suppurative w multiple infections.    Encounter Diagnoses   Name Primary?    Lymphedema of breast Yes    Breast cancer metastasized to axillary lymph node, left     S/P radiation therapy     Lymphedema syndrome, postmastectomy     Hx of deep venous thrombosis     Lymphedema of left arm        PLAN  Orders Placed This Encounter   Procedures    Miscellaneous DME    Miscellaneous DME    NM lymphoscintigram    Referral to Physical Therapy    Referral to Plastic Surgery    1, compression sleeve and glove 20-30 mmhg  2. Compression bra medium  3. Lymphoscintigraphy eval lympathic drainage  4. Lymphedema therapy continue sessions for MLD  5. Plastic referral re lymphedema surgery candidacy and interested in R breast reduction as well  5, contact therapist re compression pump; would benefit from compression advanced pump  Compression pneumatic pump ordered: Patient began Conservative Therapy including Exercise, Elevation, Compression on 4/30/2025 after four weeks has failed to resolve symptoms " including (fibrosis, radiation fibrosis, scarring, or hyperpigmentation)  The recommended treatment modality is for an  Pneumatic Compression Device for daily at home treatments to include coverage of the chest, axilla, and arm. This treatment is an adjunct treatment to compression and will aid in post mastectomy healing, prevent infection, soften fibrotic tissue, prevent infection, increase range of motion, and improve overall quality of life.        Fu 8 weeks    Camryn Henry MD  Physical Medicine and Rehabilitation          [1]   Past Medical History:  Diagnosis Date    Abnormal levels of other serum enzymes 04/07/2020    Elevated alkaline phosphatase level    Acute candidiasis of vulva and vagina 12/23/2019    Vaginal candidiasis    Breast cancer 05/01/2023    left lumpectomy w/rad & chemo    Change in bowel habit 07/19/2017    Encounter for diagnostic colonoscopy due to change in bowel habits    Contact with and (suspected) exposure to potentially hazardous body fluids 07/25/2017    History of exposure to hazardous bodily fluids    Dorsalgia, unspecified 06/17/2015    Acute back pain    Encounter for follow-up examination after completed treatment for conditions other than malignant neoplasm 02/17/2018    Hospital discharge follow-up    Encounter for screening for respiratory tuberculosis 07/25/2017    Encounter for screening for respiratory tuberculosis    Endometriosis, unspecified 10/18/2022    Endometriosis    Fever, unspecified 12/28/2021    Fever and chills    Functional intestinal disorder, unspecified 10/18/2022    Functional bowel disorder    Generalized abdominal pain 06/06/2017    Generalized abdominal pain    Hx antineoplastic chemo     Low back pain, unspecified 10/18/2022    Lumbar pain    Lymphedema syndrome, postmastectomy 11/03/2023    Other conditions influencing health status     Arthritis    Other conditions influencing health status     History of dyspareunia    Other  specified cough 09/25/2019    Productive cough    Pain in left knee 12/29/2016    Left knee pain    Personal history of diseases of the blood and blood-forming organs and certain disorders involving the immune mechanism 07/25/2017    History of anemia    Personal history of diseases of the blood and blood-forming organs and certain disorders involving the immune mechanism 07/25/2017    History of anemia    Personal history of diseases of the skin and subcutaneous tissue 06/06/2017    History of eczema    Personal history of irradiation     Personal history of other diseases of the digestive system 07/19/2017    History of constipation    Personal history of other diseases of the digestive system     History of esophageal reflux    Personal history of other diseases of the musculoskeletal system and connective tissue 02/16/2017    History of neck pain    Personal history of other diseases of the musculoskeletal system and connective tissue 01/19/2017    History of back pain    Personal history of other diseases of the nervous system and sense organs     History of migraine headaches    Personal history of other diseases of the respiratory system 12/31/2020    History of acute bacterial sinusitis    Personal history of other diseases of the respiratory system 01/21/2021    History of sore throat    Personal history of other diseases of the respiratory system 06/04/2019    History of acute bronchitis    Personal history of other mental and behavioral disorders 10/18/2022    History of depression    Personal history of other specified conditions 06/06/2017    History of exertional chest pain    Personal history of other specified conditions 08/02/2017    History of abdominal pain    Personal history of other specified conditions 10/23/2020    History of fatigue    Personal history of other specified conditions 07/25/2017    History of fatigue    Personal history of other specified conditions 02/17/2018    History of  diarrhea    Plantar fascial fibromatosis 2014    Plantar fasciitis, right    Sprain of unspecified rotator cuff capsule, initial encounter 10/18/2022    Rotator cuff (capsule) sprain    Tobacco abuse counseling 2017    Encounter for tobacco use cessation counseling   [2]   Past Surgical History:  Procedure Laterality Date    BREAST LUMPECTOMY Left 2023    left lumpectomy w/rad & chemo     SECTION, CLASSIC  10/18/2022     Section    CHOLECYSTECTOMY  2013    Cholecystectomy    CT ABDOMEN PELVIS ANGIOGRAM W AND/OR WO IV CONTRAST  2023    CT ABDOMEN PELVIS ANGIOGRAM W AND/OR WO IV CONTRAST [facility] CT    HERNIA REPAIR  10/18/2022    Hernia Repair    HYSTEROSCOPY  2014    Hysteroscopy With Endometrial Ablation    OTHER SURGICAL HISTORY  2013    Laparoscopic Appendectomy Incidental    OTHER SURGICAL HISTORY  2013    Craniotomy (Therapeutic)    OTHER SURGICAL HISTORY  2013    Arthrotomy Of Knee With Lateral Meniscectomy    OTHER SURGICAL HISTORY  10/18/2022    Intracranial Aneurysm Repair    ROTATOR CUFF REPAIR  10/18/2022    Rotator Cuff Repair    SHOULDER SURGERY  2014    Shoulder Surgery Right    TUBAL LIGATION  10/18/2022    Tubal Ligation    UMBILICAL HERNIA REPAIR  2013    Umbilical Hernia Repair   [3]   Past Medical History:  Diagnosis Date    Abnormal levels of other serum enzymes 2020    Elevated alkaline phosphatase level    Acute candidiasis of vulva and vagina 2019    Vaginal candidiasis    Breast cancer 2023    left lumpectomy w/rad & chemo    Change in bowel habit 2017    Encounter for diagnostic colonoscopy due to change in bowel habits    Contact with and (suspected) exposure to potentially hazardous body fluids 2017    History of exposure to hazardous bodily fluids    Dorsalgia, unspecified 2015    Acute back pain    Encounter for follow-up examination after completed treatment for conditions  other than malignant neoplasm 02/17/2018    Hospital discharge follow-up    Encounter for screening for respiratory tuberculosis 07/25/2017    Encounter for screening for respiratory tuberculosis    Endometriosis, unspecified 10/18/2022    Endometriosis    Fever, unspecified 12/28/2021    Fever and chills    Functional intestinal disorder, unspecified 10/18/2022    Functional bowel disorder    Generalized abdominal pain 06/06/2017    Generalized abdominal pain    Hx antineoplastic chemo     Low back pain, unspecified 10/18/2022    Lumbar pain    Lymphedema syndrome, postmastectomy 11/03/2023    Other conditions influencing health status     Arthritis    Other conditions influencing health status     History of dyspareunia    Other specified cough 09/25/2019    Productive cough    Pain in left knee 12/29/2016    Left knee pain    Personal history of diseases of the blood and blood-forming organs and certain disorders involving the immune mechanism 07/25/2017    History of anemia    Personal history of diseases of the blood and blood-forming organs and certain disorders involving the immune mechanism 07/25/2017    History of anemia    Personal history of diseases of the skin and subcutaneous tissue 06/06/2017    History of eczema    Personal history of irradiation     Personal history of other diseases of the digestive system 07/19/2017    History of constipation    Personal history of other diseases of the digestive system     History of esophageal reflux    Personal history of other diseases of the musculoskeletal system and connective tissue 02/16/2017    History of neck pain    Personal history of other diseases of the musculoskeletal system and connective tissue 01/19/2017    History of back pain    Personal history of other diseases of the nervous system and sense organs     History of migraine headaches    Personal history of other diseases of the respiratory system 12/31/2020    History of acute bacterial  sinusitis    Personal history of other diseases of the respiratory system 01/21/2021    History of sore throat    Personal history of other diseases of the respiratory system 06/04/2019    History of acute bronchitis    Personal history of other mental and behavioral disorders 10/18/2022    History of depression    Personal history of other specified conditions 06/06/2017    History of exertional chest pain    Personal history of other specified conditions 08/02/2017    History of abdominal pain    Personal history of other specified conditions 10/23/2020    History of fatigue    Personal history of other specified conditions 07/25/2017    History of fatigue    Personal history of other specified conditions 02/17/2018    History of diarrhea    Plantar fascial fibromatosis 03/22/2014    Plantar fasciitis, right    Sprain of unspecified rotator cuff capsule, initial encounter 10/18/2022    Rotator cuff (capsule) sprain    Tobacco abuse counseling 07/25/2017    Encounter for tobacco use cessation counseling   [4]   Family History  Problem Relation Name Age of Onset    Hypertension Mother      Diabetes type II Mother

## 2025-06-10 NOTE — Clinical Note
Melecio Dubois., I think you guys discussed pump, wanted to see if you think it should be ordered or if you already got the process started for advanced compression?

## 2025-06-11 ENCOUNTER — SPECIALTY PHARMACY (OUTPATIENT)
Dept: PHARMACY | Facility: CLINIC | Age: 55
End: 2025-06-11

## 2025-06-11 DIAGNOSIS — L73.2 HIDRADENITIS SUPPURATIVA: ICD-10-CM

## 2025-06-12 RX ORDER — SECUKINUMAB 150 MG/ML
INJECTION SUBCUTANEOUS
Qty: 2 ML | Refills: 5 | OUTPATIENT
Start: 2025-06-12

## 2025-06-13 ENCOUNTER — SPECIALTY PHARMACY (OUTPATIENT)
Dept: PHARMACY | Facility: CLINIC | Age: 55
End: 2025-06-13

## 2025-06-13 DIAGNOSIS — L73.2 HIDRADENITIS SUPPURATIVA: ICD-10-CM

## 2025-06-18 RX ORDER — SECUKINUMAB 150 MG/ML
INJECTION SUBCUTANEOUS
Qty: 2 ML | Refills: 5 | OUTPATIENT
Start: 2025-06-18

## 2025-06-20 ENCOUNTER — OFFICE VISIT (OUTPATIENT)
Dept: HEMATOLOGY/ONCOLOGY | Facility: HOSPITAL | Age: 55
End: 2025-06-20
Payer: MEDICARE

## 2025-06-20 VITALS
RESPIRATION RATE: 20 BRPM | DIASTOLIC BLOOD PRESSURE: 83 MMHG | HEART RATE: 76 BPM | OXYGEN SATURATION: 98 % | HEIGHT: 63 IN | TEMPERATURE: 96.8 F | SYSTOLIC BLOOD PRESSURE: 131 MMHG | BODY MASS INDEX: 36.17 KG/M2 | WEIGHT: 204.15 LBS

## 2025-06-20 DIAGNOSIS — C50.912 BREAST CANCER METASTASIZED TO AXILLARY LYMPH NODE, LEFT: ICD-10-CM

## 2025-06-20 DIAGNOSIS — C77.3 BREAST CANCER METASTASIZED TO AXILLARY LYMPH NODE, LEFT: ICD-10-CM

## 2025-06-20 PROCEDURE — 99214 OFFICE O/P EST MOD 30 MIN: CPT

## 2025-06-20 PROCEDURE — 3008F BODY MASS INDEX DOCD: CPT

## 2025-06-20 ASSESSMENT — PAIN SCALES - GENERAL: PAINLEVEL_OUTOF10: 0-NO PAIN

## 2025-06-20 NOTE — PROGRESS NOTES
DIVISION OF MEDICAL ONCOLOGY    Patient ID: Sharonda Yeboah is a 55 y.o. female.  MRN: [id number]  : 1970  The patient presents to clinic today for her history of left-sided breast cancer.    Diagnostic/Therapeutic History:  -21: Bilateral screening mammogram was negative.  -Palpated left axillary lump. Saw PCP.  -10/5/22: Diagnostic mammogram/US showed a 3.6 x 3.0 x 3.4 cm irregular bilobed mass at 2:00, 7 cm FN. Four abnormal left axillary LN’s seen. In the right axilla, a 4.1 cm superficial fatty mass noted, likely a lipoma.  -10/12/22: US-guided CNB confirmed IDC, grade 3, ER/IN-negative Her2-negative (1+ IHC). A left axillary LN was positive for metastatic carcinoma.  -10/19/22: Breast MRI confirmed a 4.4 cm irregular mass with central necrosis in the left breast. Two bulky level 1 axillary LN’s seen (no level 2/3 LN’s identified). A peripheral rim-enhancing mass seen in left internal mammary region,  measuring 0.9 x 0.8 cm, concerning for malignant lymph node. Left axillary skin thickening seen.  -Neoadjuvant chemotherapy with [id number]-522 regimen, 22-23. She required dose-reductions for SBO's that were managed conservatively.  -23: Left lumpectomy/SLNBx performed. pathology: pCR to therapy.  -Received 3 doses of adjuvant pembrolizumab, but decided to stop for abdominal symptoms and diarrhea.  -23 - 23: Radiation therapy to the left breast, SCV consisting of a dose of 42.56 Gy with additional 10 Gy to the tumor bed for a total of 52.56 Gy.     History of Present Illness (HPI)/Interval History:  Sharonda Yeboah presents today for routine FUV.  Overall, she has been feeling well since her last appointment.   She onEliquis due to a provoked DVT after knee surgery, sees hematology soon.   She drinks energy drinks for a bit more energy. Able to perform her usual activities.  No masses or lumps.   Appetite is good.   She will be seeing Dr. Hernandez for ongoing lymphedema. Also  follows with PT and Dr. Parker.   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     Review of Systems:  14-point ROS otherwise negative, as per HPI/Interval History.    Past Medical History:   Diagnosis Date    Abnormal levels of other serum enzymes 04/07/2020    Elevated alkaline phosphatase level    Acute candidiasis of vulva and vagina 12/23/2019    Vaginal candidiasis    Breast cancer 05/01/2023    left lumpectomy w/rad & chemo    Change in bowel habit 07/19/2017    Encounter for diagnostic colonoscopy due to change in bowel habits    Contact with and (suspected) exposure to potentially hazardous body fluids 07/25/2017    History of exposure to hazardous bodily fluids    Dorsalgia, unspecified 06/17/2015    Acute back pain    Encounter for follow-up examination after completed treatment for conditions other than malignant neoplasm 02/17/2018    Hospital discharge follow-up    Encounter for screening for respiratory tuberculosis 07/25/2017    Encounter for screening for respiratory tuberculosis    Endometriosis, unspecified 10/18/2022    Endometriosis    Fever, unspecified 12/28/2021    Fever and chills    Functional intestinal disorder, unspecified 10/18/2022    Functional bowel disorder    Generalized abdominal pain 06/06/2017    Generalized abdominal pain    Hx antineoplastic chemo     Low back pain, unspecified 10/18/2022    Lumbar pain    Lymphedema syndrome, postmastectomy 11/03/2023    Other conditions influencing health status     Arthritis    Other conditions influencing health status     History of dyspareunia    Other specified cough 09/25/2019    Productive cough    Pain in left knee 12/29/2016    Left knee pain    Personal history of diseases of the blood and blood-forming organs and certain disorders involving the immune mechanism 07/25/2017    History of anemia    Personal history of diseases of the blood and blood-forming organs  and certain disorders involving the immune mechanism 07/25/2017    History of anemia    Personal history of diseases of the skin and subcutaneous tissue 06/06/2017    History of eczema    Personal history of irradiation     Personal history of other diseases of the digestive system 07/19/2017    History of constipation    Personal history of other diseases of the digestive system     History of esophageal reflux    Personal history of other diseases of the musculoskeletal system and connective tissue 02/16/2017    History of neck pain    Personal history of other diseases of the musculoskeletal system and connective tissue 01/19/2017    History of back pain    Personal history of other diseases of the nervous system and sense organs     History of migraine headaches    Personal history of other diseases of the respiratory system 12/31/2020    History of acute bacterial sinusitis    Personal history of other diseases of the respiratory system 01/21/2021    History of sore throat    Personal history of other diseases of the respiratory system 06/04/2019    History of acute bronchitis    Personal history of other mental and behavioral disorders 10/18/2022    History of depression    Personal history of other specified conditions 06/06/2017    History of exertional chest pain    Personal history of other specified conditions 08/02/2017    History of abdominal pain    Personal history of other specified conditions 10/23/2020    History of fatigue    Personal history of other specified conditions 07/25/2017    History of fatigue    Personal history of other specified conditions 02/17/2018    History of diarrhea    Plantar fascial fibromatosis 03/22/2014    Plantar fasciitis, right    Sprain of unspecified rotator cuff capsule, initial encounter 10/18/2022    Rotator cuff (capsule) sprain    Tobacco abuse counseling 07/25/2017    Encounter for tobacco use cessation counseling     Patient Active Problem List   Diagnosis     Breast cancer metastasized to axillary lymph node, left    Carpal tunnel syndrome, right    Cervical spinal stenosis    Goiter    Hidradenitis suppurativa    Uterine fibroid    Vasomotor symptoms due to menopause    Vitamin D deficiency    Medicare annual wellness visit, subsequent    Status post partial mastectomy of left breast    S/P radiation therapy    S/P chemotherapy, time since greater than 12 weeks    Lymphedema syndrome, postmastectomy    Hx of deep venous thrombosis        Past Surgical History:   Procedure Laterality Date    BREAST LUMPECTOMY Left 2023    left lumpectomy w/rad & chemo     SECTION, CLASSIC  10/18/2022     Section    CHOLECYSTECTOMY  2013    Cholecystectomy    CT ABDOMEN PELVIS ANGIOGRAM W AND/OR WO IV CONTRAST  2023    CT ABDOMEN PELVIS ANGIOGRAM W AND/OR WO IV CONTRAST [facility] CT    HERNIA REPAIR  10/18/2022    Hernia Repair    HYSTEROSCOPY  2014    Hysteroscopy With Endometrial Ablation    OTHER SURGICAL HISTORY  2013    Laparoscopic Appendectomy Incidental    OTHER SURGICAL HISTORY  2013    Craniotomy (Therapeutic)    OTHER SURGICAL HISTORY  2013    Arthrotomy Of Knee With Lateral Meniscectomy    OTHER SURGICAL HISTORY  10/18/2022    Intracranial Aneurysm Repair    ROTATOR CUFF REPAIR  10/18/2022    Rotator Cuff Repair    SHOULDER SURGERY  2014    Shoulder Surgery Right    TUBAL LIGATION  10/18/2022    Tubal Ligation    UMBILICAL HERNIA REPAIR  2013    Umbilical Hernia Repair       Social History     Socioeconomic History    Marital status: Single    Number of children: 4   Tobacco Use    Smoking status: Former     Current packs/day: 0.00     Average packs/day: 0.3 packs/day for 5.0 years (1.3 ttl pk-yrs)     Types: Cigarettes     Start date: 10/1/2017     Quit date: 10/1/2022     Years since quittin.7    Smokeless tobacco: Never   Substance and Sexual Activity    Alcohol use: Yes     Comment: 1 glass of wine 3 times  a year    Drug use: Never    Sexual activity: Not Currently     Birth control/protection: Post-menopausal     Social Drivers of Health     Financial Resource Strain: Low Risk  (12/17/2023)    Received from [facility]    Overall Financial Resource Strain (CARDIA)     Difficulty of Paying Living Expenses: Not hard at all   Food Insecurity: No Food Insecurity (3/29/2025)    Received from [facility]    Hunger Vital Sign     Worried About Running Out of Food in the Last Year: Never true     Ran Out of Food in the Last Year: Never true   Transportation Needs: No Transportation Needs (3/29/2025)    Received from [facility]    PRAPARE - Transportation     Lack of Transportation (Medical): No     Lack of Transportation (Non-Medical): No   Physical Activity: Sufficiently Active (3/8/2024)    Received from [facility]    Exercise Vital Sign     Days of Exercise per Week: 4 days     Minutes of Exercise per Session: 60 min   Stress: No Stress Concern Present (3/8/2024)    Received from [facility]    [place] [employer] - Occupational Stress Questionnaire     Feeling of Stress : Only a little   Social Connections: Moderately Isolated (3/8/2024)    Received from [facility]    Social Connection and Isolation Panel [NHANES]     Frequency of Communication with Friends and Family: More than three times a week     Frequency of Social Gatherings with Friends and Family: Once a week     Attends Alevism Services: More than 4 times per year     Active Member of Clubs or Organizations: No     Attends Club or Organization Meetings: Never     Marital Status: Never    Housing Stability: Low Risk  (3/29/2025)    Received from [facility]    Housing Stability Vital Sign     Unable to Pay for Housing in the Last Year: No     Number of Times Moved in the Last Year: 1     Homeless in the Last Year: No       Family History   Problem Relation Name Age of Onset    Hypertension  "Mother      Diabetes type II Mother         Allergies:   No Known Allergies      Current Outpatient Medications:     apixaban (Eliquis) 5 mg tablet, Take 1 tablet (5 mg) by mouth 2 times a day., Disp: 60 tablet, Rfl: 1    benzoyl peroxide (Benzac AC) 10 % external wash, Apply topically once daily. As directed leave on for 5 min then rinse, Disp: 227 g, Rfl: 11    biotin 1 mg tablet, Take 1 tablet (1,000 mcg) by mouth once daily., Disp: , Rfl:     calcium carbonate (Oscal) 500 mg calcium (1,250 mg) tablet, Take 1 tablet by mouth 2 times daily (morning and late afternoon)., Disp: , Rfl:     multivitamin tablet, Take 1 tablet by mouth once daily., Disp: , Rfl:     omeprazole (PriLOSEC) 40 mg DR capsule, Take 1 capsule (40 mg) by mouth once daily in the morning. Take before meals., Disp: 90 capsule, Rfl: 1    Bacillus coagulans 400 million cell tablet,chewable, Chew 1 tablet 2 times a day. (Patient not taking: Reported on 2025), Disp: 180 tablet, Rfl: 3     Objective    BSA: 2.03 meters squared  /83   Pulse 76   Temp 36 °C (96.8 °F) (Temporal)   Resp 20   Ht 1.6 m (5' 2.99\")   Wt 92.6 kg (204 lb 2.3 oz)   SpO2 98%   BMI 36.17 kg/m²     Performance Status:  The ECOG performance scale today is ECO- Fully active, able to carry on all pre-disease performance w/o restriction.      Physical Exam  Vitals reviewed.   Constitutional:       General: She is not in acute distress.     Appearance: Normal appearance. She is not ill-appearing, toxic-appearing or diaphoretic.   HENT:      Head: Normocephalic and atraumatic.      Mouth/Throat:      Mouth: Mucous membranes are moist.      Pharynx: Oropharynx is clear.   Eyes:      General: No scleral icterus.     Extraocular Movements: Extraocular movements intact.      Conjunctiva/sclera: Conjunctivae normal.   Cardiovascular:      Rate and Rhythm: Normal rate and regular rhythm.      Heart sounds: No murmur heard.  Pulmonary:      Effort: Pulmonary effort is " normal. No respiratory distress.      Breath sounds: Normal breath sounds. No wheezing.   Chest:   Breasts:     Right: Normal. No bleeding, mass, skin change or tenderness.      Left: Skin change present. No bleeding, mass or tenderness.      Comments: Left breast lymphedema noted, improved from last appointment.  Musculoskeletal:         General: No swelling or tenderness.      Cervical back: Normal range of motion and neck supple. No tenderness.   Lymphadenopathy:      Cervical: No cervical adenopathy.      Upper Body:      Right upper body: No supraclavicular, axillary or pectoral adenopathy.      Left upper body: No supraclavicular, axillary or pectoral adenopathy.   Skin:     General: Skin is warm and dry.      Coloration: Skin is not jaundiced.      Findings: No rash.      Comments: Bilateral axillary hidradenitis suppurativa.    Neurological:      General: No focal deficit present.      Mental Status: She is alert and oriented to person, place, and time. Mental status is at baseline.      Gait: Gait normal.   Psychiatric:         Mood and Affect: Mood normal.         Behavior: Behavior normal.         Thought Content: Thought content normal.         Judgment: Judgment normal.         Laboratory Data:  Lab Results   Component Value Date    WBC 6.5 01/28/2025    HGB 10.1 (L) 01/28/2025    HCT 33.9 (L) 01/28/2025    MCV 87.6 01/28/2025     (H) 01/28/2025    ANC 2.87 08/04/2023       Chemistry    Lab Results   Component Value Date/Time     01/28/2025 1517    K 4.1 01/28/2025 1517     01/28/2025 1517    CO2 27 01/28/2025 1517    BUN 9 01/28/2025 1517    CREATININE 0.80 01/28/2025 1517    Lab Results   Component Value Date/Time    CALCIUM 9.1 01/28/2025 1517    ALKPHOS 88 01/28/2025 1517    AST 11 01/28/2025 1517    ALT 9 01/28/2025 1517    BILITOT 0.3 01/28/2025 1517        Radiology:  === 02/06/24 ===    CT ANGIO CHEST FOR PULMONARY EMBOLISM    - Impression -  1. No evidence of acute pulmonary  embolism.  2. Chronic radiation changes and traction bronchiectasis of the left  upper lobe.  3. Postsurgical changes in the left breast status post  lumpectomy/left axillary lymph node dissection as described above.  There is nonspecific soft tissue thickening/stranding/edema involving  the deep tissues of the visualized portions of the left breast and  chest wall. Findings are favored to represent postradiation changes.  However recommend clinical with physical exam if clinical concern for  mastitis. Also possibility of locoregional disease/tumor recurrence  cannot be excluded. Correlate clinically and consider correlate with  dedicated breast imaging.  4. Stable 2 mm calcified granuloma involving the left upper lobe as  described above.    I personally reviewed the images/study and I agree with the findings  as stated by resident Levi Barriga this study was interpreted at  [facility], [place], [place].    MACRO:  None    Signed by: Elizabeth Ramos 2/6/2024 2:31 PM  Dictation workstation:   [id number]      Assessment/Plan:  Sharonda Yeboah is a 55 y.o. female with a history of left-sided triple-negative breast cancer, who presents today for follow-up evaluation on observation.     1. Left breast cancer. Stage IIIC (cT2-3 cN3b cM0), grade 3 IDC, triple-negative.    S/p neoadjuvant chemoimmunotherapy with pCR obtained. S/p lumpectomy and adjuvant RT.  - No concerning symptoms or exam findings reported today.  - Ongoing left breast lymphedema - following with PM&R and PT. Will see Dr. Hernandez with plastics as well.   - Mammogram 10/10/2024: CAT 2, continue yearly screenings     2. Hidradenitis suppurativa. Diagnosed in late-teens. S/p multiple lines of therapy, including axillary surgery. S/p infection in Jan 2025.   - Following with Dr. Jamia Carter from Dermatology.      3. H/o recurrent SBO. S/p multiple hernia repair surgeries and prior bowel resection for SBO.  -  Last admitted 12/2023, managed conservatively. Currently no symptoms.     4. LLE DVT. Provoked in the setting of chemotherapy and prior hospitalization.   New DVT 12/2024 2/2 knee replacement   - On eliquis  - Seeing hematology next month   - No evidence of recurrent DVTs today     Disposition.  - RTC 6 months with Celina Nieves PA-C   - She has our contact information and was instructed to call with concerns/questions in the interim.    No orders of the defined types were placed in this encounter.     Celina Nieves PA-C  Hematology and Medical Oncology  [facility]

## 2025-06-24 NOTE — PROGRESS NOTES
Subjective :  Patient ID: Sharonda Yeboah is a 55 y.o. female presenting as a new patient referral from Dr. Henry.     History of Present Illness: Patient presents today with a history of left breast cancer diagnosed in 2022. US-guided CNB confirmed IDC, grade 3, ER/IN-negative Her2-negative (1+ IHC). A left axillary LN was positive for metastatic carcinoma. Treated with lumpectomy, chemo and radiation.  She has complaints of chest and breast edema after her lumpectomy that she manages with lymphedema PT twice a week since surgery. Notes some fatigue but no restrictions with her daily activities. Wears compressive bras. A pneumatic pump was ordered this past week.  She also has a history of Bilateral axillary hidradenitis suppurativa and DVT after knee surgery on anticoagulation. Patient states her left breast/ chest swell and become heavy despite lymphedema therapy weekly since her surgery in 2022.     BMI 36.29     Review of Systems  ROS: All 10 systems were reviewed and are unremarkable except for those mentioned in HPI.     Objective :  Physical Exam  Vitals and nursing note reviewed. Exam conducted with a chaperone present.   Constitutional:       General: not in acute distress.     Appearance: not ill-appearing.   Eyes:      Extraocular Movements: Extraocular movements intact.      Conjunctiva/sclera: Conjunctivae normal.      Pupils: Pupils are equal, round, and reactive to light.   Cardiovascular:      Rate and Rhythm: Normal rate and regular rhythm.      Pulses: Normal pulses.   Pulmonary:      Effort: Pulmonary effort is normal.      Breath sounds: Normal breath sounds.   Abdominal:      Palpations: Abdomen is soft. There is no mass.      Tenderness: There is no abdominal tenderness.      Hernia: No hernia is present.   Musculoskeletal:         General: No swelling or tenderness.      Cervical back: Normal range of motion and neck supple.   Skin:     Capillary Refill: Capillary refill takes less than 2  seconds.      Coloration: Skin is not jaundiced.      Findings: No bruising or rash.   Neurological:      General: No focal deficit present.      Mental Status: oriented to person, place, and time.   Psychiatric:         Mood and Affect: Mood normal.         Behavior: Behavior normal.         Thought Content: Thought content normal.         Judgment: Judgment normal.     Focused exam on breasts:  Asymmetry: +++  Ptosis: Bilateral grade III  Nipple-areola-complex: Medially displaced.   Left breast: Orange skin peel, fluid accumulation: ++, tissue fibrosis: ++, tissue contracture: ++, tenderness: ++. Lump: -    Abdomen: Midline laparotomy incision scar, multiple scars at the right upper quadrant with tethering to fascia. Grade III abdominal pannus. No lumps. No hernia.       Bilateral mammogram 10/14/24 IMPRESSION: Left breast post treatment changes. No mammographic evidence of malignancy.    Vascular US upper extremity venous duplex left 9/23/24  Order: [id number]  Impression    IMPRESSION:    Negative study for DVT in the left upper extremity.    Positive study for  superficial thrombophlebitis in the cephalic vein.    LEFT UPPER EXTREMITY     DEEP VEINS   Internal Jugular vein: Normal compression, normal spontaneous flow.   Subclavian vein:  Normal, spontaneous flow.   Axillary vein:  Normal compression, normal spontaneous flow.   Brachial vein:  Normal compression     SUPERFICIAL VEINS   Basilic vein: Normal compression.   Cephalic vein:  Abnormal, incomplete compression.      Assessment/Plan :  Sharonda is a very pleasant female patient who presents with chronic lymphedema to left breast following lumpectomy, axillary lymph node removal (6 nodes ), chemo therapy and radiation.   She complains of fluid buildup in the left breast, which then becomes enlarged and hard.   She stated that such flare ups are frequent and painful.   Lymphatic massage has not been helpful in lymphedema management.     On assessment today,  she presents with breast asymmetry and grade II ptosis. Left breast is deformed with orange peel appearance of the skin, positive for fluid accumulation, and tender on palpation. Additionally, fibrosis and contracture is present at the lateral chest.     I reviewed with patient treatment options including non surgical such as lymphatic pump which was recommended by PT, surgical treatment with LVA and VLNA and their variants, and completion mastectomy with breast reconstruction from the abdomen (pending CTA results due to extensive hernia repair), or the gluteal regions, (thigh was not considered due to hydradenitis).  Pros and cons were reviewed.      In my opinion, given radiation-induced skin changes and breast deformity, and that the swelling is limited to breast and chest, total breast reconstruction is warranted. This can be combined with LVA and lymph nodes if indicated based on lymphoscintigraphy and ICG lymphography studies.    Patient meanwhile can proceed with lymphatic pump for symptoms relief but conservative therapy will not address deformity, scarring and fibrosis, and radiation induced skin changes.      Plan:   -Please complete NM lymphoscintigram and CTA abdomen and Pelvis   -In-office ICG for left upper extremity and left breast basad on the NM lymphoscintigram.

## 2025-06-25 ENCOUNTER — PATIENT OUTREACH (OUTPATIENT)
Dept: PRIMARY CARE | Facility: CLINIC | Age: 55
End: 2025-06-25
Payer: MEDICARE

## 2025-06-27 ENCOUNTER — TELEPHONE (OUTPATIENT)
Dept: DERMATOLOGY | Facility: CLINIC | Age: 55
End: 2025-06-27
Payer: MEDICARE

## 2025-06-27 ENCOUNTER — PATIENT OUTREACH (OUTPATIENT)
Dept: HEMATOLOGY/ONCOLOGY | Facility: HOSPITAL | Age: 55
End: 2025-06-27
Payer: MEDICARE

## 2025-06-27 NOTE — PROGRESS NOTES
RN talked to patient. Patient was referred by Celina Nieves PA-C for DVT x 2. Patient stated that she had a DVT in 2022 and again last September after a knee surgery. Patient saw a Dr. Doug Vargas on 12/5/24 for her DVT and she did have some hypercoagulable labs done at [facility]. Patient did not want to continue seeing him. Patient aware of date, time and place of appointment. Call back number reviewed.

## 2025-06-27 NOTE — TELEPHONE ENCOUNTER
Can we schedule a time on Wednesday for me to call her on a video virtual visit? Ok to DB if needed

## 2025-07-02 ENCOUNTER — TELEMEDICINE (OUTPATIENT)
Dept: DERMATOLOGY | Facility: CLINIC | Age: 55
End: 2025-07-02
Payer: MEDICARE

## 2025-07-02 ENCOUNTER — TELEPHONE (OUTPATIENT)
Dept: PRIMARY CARE | Facility: CLINIC | Age: 55
End: 2025-07-02

## 2025-07-02 DIAGNOSIS — L73.2 HIDRADENITIS SUPPURATIVA: Primary | ICD-10-CM

## 2025-07-02 PROCEDURE — 99214 OFFICE O/P EST MOD 30 MIN: CPT | Performed by: DERMATOLOGY

## 2025-07-02 RX ORDER — SULFAMETHOXAZOLE AND TRIMETHOPRIM 800; 160 MG/1; MG/1
1 TABLET ORAL 2 TIMES DAILY
Qty: 60 TABLET | Refills: 0 | Status: SHIPPED | OUTPATIENT
Start: 2025-07-02 | End: 2025-08-01

## 2025-07-02 NOTE — Clinical Note
Hidradenitis Suppurativa - Griffiths Stage 3  - Discussed the nature of this condition and treatment options.     Prior treatments have included  - Topicals: benzoyl peroxide and clindamycin lotion  - PO antibiotics:  rifampin/clindamycin - could not tolerate the entire treatment course,  minocycline - not effective, and doxycycline  - Biologics: Humira - d/c'd due to psoriasis and Cosentyx - dc'd after recent hospitalization for left breast cellulitis/MRSE bacteremia.  - ILK x 1 - no improvement, but did not come for follow up appointments   - Surgery years ago at [facility] with recurrence      Other work-up:  - 8/19/24 bacterial culture - normal demetra. Rx doxycycline PO  - 1/9/25 recent hospitalization for left breast cellulitis with MRSE bacteremia that may be due to lymphedema s/p IV antibiotics, complicated by C. diff infection      -Today, we discussed multiple different treatment options including PO antibiotics, Bimzlex, infliximab, spironolactone, and metformin. This is a complex situation as patient has severe disease but has developed multiple side effects of systemic therapy.  She developed C. Diff previously due to abx use. Patient has also developed infections/psoriasis due to two IL-17 inhibitors.      -After discussion, patient would like to continue Bactrim x 1 month and start ozempic, spironolactone, and metformin. Patient reports she was on metformin previously and is unsure why it was discontinued.   -She has a history of breast cancer (ER/CT/HER2 negative). Will reach out to patient's oncologist and PCP to discuss the above treatments.     Relevant labs:  HIV screening 10/2023  Hepatitis C Ab 10/2023  CBC 11/8/24  CMP 11/8/24  T-spot 12/19/24  Hep B 12/19/24

## 2025-07-02 NOTE — Clinical Note
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 Potentially starting Consentyx and stopping it has lead to re triggering psoriasis flare    Rx topical steroid - start clobetasol 0.05% ointment

## 2025-07-02 NOTE — Clinical Note
Thin circular brown plaques symmetrically on lower legs bilaterally on shins and medial calves, left forearm. 5 plaques total    Sarecycline Pregnancy And Lactation Text: This medication is Pregnancy Category D and not consider safe during pregnancy. It is also excreted in breast milk.

## 2025-07-02 NOTE — PROGRESS NOTES
"Subjective     Sharonda Yeboah is a 55 y.o. female who presents for the following: Hidradenitis Suppurativa.     Review of Systems:  No other skin or systemic complaints other than what is documented elsewhere in the note.    The following portions of the chart were reviewed this encounter and updated as appropriate:   Tobacco  Allergies  Meds  Problems  Med Hx  Surg Hx  Fam Hx         Skin Cancer History  Biopsy Log Book  No skin cancers from Specimen Tracking.    Additional History      Specialty Problems          Dermatology Problems    Hidradenitis suppurativa    Hidradenitis - Many years. Surgery R axilla. Doxy/Fabio \"keeps it at bay.\" Humira c/b psoriasiswithin 1 month, stopped.Crohn's disease - diagnosed/treated at [facility] but quiescent for years per patient report              Objective   Well appearing patient in no apparent distress; mood and affect are within normal limits.    A focused skin examination was performed. All findings within normal limits unless otherwise noted below.    Assessment/Plan   Skin Exam  1. HIDRADENITIS SUPPURATIVA  Gluteal Crease, Left Axilla, Left Medial Thigh, Right Axilla, Right Medial Thigh  Deep sinus tract formation and numerous scars  bilateral axillae and groin. Griffiths stage 3.   Hidradenitis Suppurativa - Griffiths Stage 3  - Discussed the nature of this condition and treatment options.     Prior treatments have included  - Topicals: benzoyl peroxide and clindamycin lotion  - PO antibiotics:  rifampin/clindamycin - could not tolerate the entire treatment course,  minocycline - not effective, and doxycycline  - Biologics: Humira - d/c'd due to psoriasis and Cosentyx - dc'd after recent hospitalization for left breast cellulitis/MRSE bacteremia.  - ILK x 1 - no improvement, but did not come for follow up appointments   - Surgery years ago at [facility] with recurrence      Other work-up:  - 8/19/24 bacterial culture - normal demetra. Rx doxycycline PO  - 1/9/25 recent " hospitalization for left breast cellulitis with MRSE bacteremia that may be due to lymphedema s/p IV antibiotics, complicated by C. diff infection      -Today, we discussed multiple different treatment options including PO antibiotics, Bimzlex, infliximab, spironolactone, and metformin. This is a complex situation as patient has severe disease but has developed multiple side effects of systemic therapy.  She developed C. Diff previously due to abx use. Patient has also developed infections/psoriasis due to two IL-17 inhibitors.      -After discussion, patient would like to continue Bactrim x 1 month and start ozempic, spironolactone, and metformin. Patient reports she was on metformin previously and is unsure why it was discontinued.   -She has a history of breast cancer (ER/OK/HER2 negative). Will reach out to patient's oncologist and PCP to discuss the above treatments.     Relevant labs:  HIV screening 10/2023  Hepatitis C Ab 10/2023  CBC 11/8/24  CMP 11/8/24  T-spot 12/19/24  Hep B 12/19/24    Related Medications  benzoyl peroxide (Benzac AC) 10 % external wash  Apply topically once daily. As directed leave on for 5 min then rinse  sulfamethoxazole-trimethoprim (Bactrim DS) 800-160 mg tablet  Take 1 tablet by mouth 2 times a day.    Virtual or Telephone Consent    An interactive audio and video telecommunication system which permits real time communications between the patient (at the originating site) and provider (at the distant site) was utilized to provide this telehealth service.   Verbal consent was requested and obtained from Sharonda Yeboah on this date, 07/04/25 for a telehealth visit and the patient's location was confirmed at the time of the visit.    RTC 7/28/25 as previously scheduled.     Abi Felton, DO   PGY-4, Department of Dermatology    Hi Dr. Mchugh and Celina Nieves,     My resident and I saw Sharonda Yeboah in clinic this week. This is a very challenging case. She has failed multiple  topical and systemic therapies and developed side effects from these treatments. We discussed continuing Bactrim for 1 month (cautiously due to history of C. Diff) and the possibility of starting spironolactone, metformin, and ozempic.     We wanted to discuss these treatment options with both of you as she has a history of ER/MD/HER2 negative breast cancer. Patient thinks she was previously on metformin for her HS, but is unsure of why it was discontinued.

## 2025-07-02 NOTE — Clinical Note
Dear Dr. Mchugh and Dr. Jose Francisco Hargrove (PGY-3 dermatology resident) and I saw Sharonda Yeboah in dermatology clinic last week for her hidradenitis suppurativa. This is a very challenging case. She has failed multiple topical and systemic therapies and developed significant side effects from these treatments. We discussed continuing Bactrim for 1 month (cautiously due to history of C. Diff) and the possibility of starting spironolactone, metformin, and/or ozempic.   We wanted to discuss these treatment options with both of you as she has a history of ER/MT/HER2 negative breast cancer. Patient thinks she was previously on metformin for her HS, but is unsure of why it was discontinued. There is resurgence in the dermatology literature of using metformin for hidradenitis suppurativa. Ozempic, being newer, does not have as much evidence but would likely help her hidradenitis suppurativa as well

## 2025-07-02 NOTE — Clinical Note
-Discussed diagnosis of acne  -Discussed natural history of condition and expectations for treatment  -Recommend:  - - Start benzoyl peroxide 5% cleanser daily, leave on face for 2-3 minutes, rinse with water. Side effects of drying and irritation and bleaching of towels discussed  - Start clindamycin 1% lotion daily to face  - Start tretinoin 0.025% cream nightly, pea-sized amount to face, start every other night and increase to nightly as tolerated. Side effects of drying and irritation discussed

## 2025-07-03 ENCOUNTER — OFFICE VISIT (OUTPATIENT)
Dept: HEMATOLOGY/ONCOLOGY | Facility: HOSPITAL | Age: 55
End: 2025-07-03
Payer: MEDICARE

## 2025-07-03 ENCOUNTER — TREATMENT (OUTPATIENT)
Dept: PHYSICAL THERAPY | Facility: CLINIC | Age: 55
End: 2025-07-03
Payer: MEDICARE

## 2025-07-03 VITALS
RESPIRATION RATE: 18 BRPM | WEIGHT: 204.81 LBS | OXYGEN SATURATION: 100 % | TEMPERATURE: 96.8 F | HEART RATE: 74 BPM | BODY MASS INDEX: 36.29 KG/M2 | DIASTOLIC BLOOD PRESSURE: 72 MMHG | SYSTOLIC BLOOD PRESSURE: 118 MMHG

## 2025-07-03 DIAGNOSIS — Z86.718 HX OF DEEP VENOUS THROMBOSIS: ICD-10-CM

## 2025-07-03 DIAGNOSIS — I89.0 LYMPHEDEMA OF BREAST: ICD-10-CM

## 2025-07-03 DIAGNOSIS — I89.0 LYMPHEDEMA OF LEFT ARM: ICD-10-CM

## 2025-07-03 DIAGNOSIS — I97.2 LYMPHEDEMA SYNDROME, POSTMASTECTOMY: Primary | ICD-10-CM

## 2025-07-03 DIAGNOSIS — Z92.3 S/P RADIATION THERAPY: ICD-10-CM

## 2025-07-03 DIAGNOSIS — C50.912 BREAST CANCER METASTASIZED TO AXILLARY LYMPH NODE, LEFT: ICD-10-CM

## 2025-07-03 DIAGNOSIS — C77.3 BREAST CANCER METASTASIZED TO AXILLARY LYMPH NODE, LEFT: ICD-10-CM

## 2025-07-03 DIAGNOSIS — I82.4Y2 ACUTE DEEP VEIN THROMBOSIS (DVT) OF PROXIMAL VEIN OF LEFT LOWER EXTREMITY: ICD-10-CM

## 2025-07-03 PROCEDURE — 99214 OFFICE O/P EST MOD 30 MIN: CPT | Performed by: STUDENT IN AN ORGANIZED HEALTH CARE EDUCATION/TRAINING PROGRAM

## 2025-07-03 PROCEDURE — 97530 THERAPEUTIC ACTIVITIES: CPT | Mod: GP

## 2025-07-03 ASSESSMENT — PATIENT HEALTH QUESTIONNAIRE - PHQ9
SUM OF ALL RESPONSES TO PHQ9 QUESTIONS 1 AND 2: 1
2. FEELING DOWN, DEPRESSED OR HOPELESS: SEVERAL DAYS
1. LITTLE INTEREST OR PLEASURE IN DOING THINGS: NOT AT ALL

## 2025-07-03 ASSESSMENT — PAIN - FUNCTIONAL ASSESSMENT: PAIN_FUNCTIONAL_ASSESSMENT: 0-10

## 2025-07-03 ASSESSMENT — PAIN SCALES - GENERAL
PAINLEVEL_OUTOF10: 0 - NO PAIN
PAINLEVEL_OUTOF10: 0-NO PAIN

## 2025-07-03 NOTE — PROGRESS NOTES
Physical Therapy Treatment    Patient Name: Sharonda Yeboah  MRN: [id number]  Today's Date: 7/3/2025  Time Calculation  Start Time: 1200  Stop Time: 1300  Time Calculation (min): 60 min  PT Therapeutic Procedures Time Entry  Therapeutic Activity Time Entry: 55    Insurance:  Visit number: 7 of pending  Authorization info: pending(OK to see today per [place])  Insurance Type: Payor: [employer] / Plan: [employer]VANTAGE / Product Type: *No Product type* /     Current Problem   1. Lymphedema syndrome, postmastectomy  Referral to Physical Therapy      2. Lymphedema of breast  Referral to Physical Therapy      3. Breast cancer metastasized to axillary lymph node, left  Referral to Physical Therapy      4. S/P radiation therapy  Referral to Physical Therapy      5. Hx of deep venous thrombosis  Referral to Physical Therapy      6. Lymphedema of left arm  Referral to Physical Therapy          Subjective   Pt reports that she recently developed a boil in left axilla and it has been draining but no pain. Left breast continues to feel full. Has been wearing compression bra and doing her exercises for at least 4 weeks.    Precautions  Medical Precautions: Lymphedema precautions    Pain Assessment: 0-10  0-10 (Numeric) Pain Score: 0 - No pain  Post Treatment Pain Level 0    Objective   Chest circumference at nipple level  116cm   Left breast slight peau d'orange skin texture with mild fibrosis. Mod edema noted in left lateral trunk, extending posteriorly into upper back.     Arm Girth                    Left    Hand     19.4cm    Wrist     15.4cm    8cm       20.8cm    16cm     26.0cm    24cm     31.2cm    32cm     34.0cm    40cm     36.7cm      Treatments:  Therapeutic activity:    [employer]  performed trial of  programmable pneumatic compression pump with Biovest at 45mmHg x 30min. Pt educated in pump protocol, benefits and expected outcomes. All questions answered    Assessment   Assessment:   PT  "Assessment  Evaluation/Treatment Tolerance: Patient tolerated treatment well  Assessment Comment: Pt tolerated pump trial well. Reported left breast and UE feeling \"lighter\" after treatment. Pt  has chronic lymphedema of the left upper extremity and breast/trunk with fibrotic tissue, scarring and peau d'orange skin texture.  She attained this chronic condition after breast cancer treatment and the subsequent surgery where at least 6 lymph nodes were removed in her arm and chest. She is being reevaluated today following a 4-week trial of daily compression, elevation, and regular exercise. Lymphedema has not improved, and symptoms persist. Due to the patients significant symptoms of pain due to fluid accumulation, asymmetrical limbs, lymphorrea leakage, and swelling extending into the side body and chest the E651 non programmable pump would be ineffective. The patient will benefit from the advanced  programmable pump with manual pressure control to deliver effective therapy to each segment’s pressure tolerance to tailor to the patient’s specific needs    Plan:   Cont with complete decongestive therapy 1-2x week for 4 more weeks pending insurance approval     OP EDUCATION:  Outpatient Education  Individual(s) Educated: Patient  Education Provided: Lymphedema care    Goals:   Active       PT Problem       Pt independent with lymphedema risk-reduction strategies (Met)       Start:  03/26/25    Expected End:  06/24/25    Resolved:  05/23/25         Pt independent with lymphedema self-care/self-management strategies including self-MLD (Progressing)       Start:  03/26/25    Expected End:  10/01/25            Pt to report 50% improvement in feeling of heaviness of left breast (Progressing)       Start:  03/26/25    Expected End:  10/01/25            Improve LLIS by at least 10 points (Progressing)       Start:  03/26/25    Expected End:  10/01/25                 "

## 2025-07-06 NOTE — PROGRESS NOTES
"Subjective   Patient ID: Sharonda Yeboah is a 53 y.o. female who presents for Medicare Annual Wellness Visit Subsequent.    HPI   The patient reports that she is on minocycline for hidradenitis and is following up with breast surgery for her history of breast cancer. She is also following up with Dr. Hamlin for her history of blood clots.    Review of Systems  Constitutional: + night sweats. No fever or chills  Eyes: + Blurry Vision. No Eye sight problems  ENT: No Nasal Discharge, Hoarseness, sore throat  Cardiovascular: no chest pain, no palpitations and no syncope.   Respiratory: no cough, no shortness of breath during exertion and no shortness of breath at rest.   Gastrointestinal: no abdominal pain, no nausea and no vomiting.   : No vaginal discharge, burning with urination, no blood in urine or stools  Skin: No Skin rashes or Lesions  Neuro: No Headache, no dizziness or Numbness or tingling  Psych: + Anxiety and depression. No sleeping problems  Heme: No Easy bleeding or brusing.     Objective   /75   Pulse 81   Ht 1.626 m (5' 4\")   Wt 83.9 kg (185 lb)   SpO2 95%   BMI 31.76 kg/m²     Physical Exam  Patient declined Chaperone  Constitutional: Alert and in no acute distress. Well developed, well nourished.   Head and Face: Head and face: Normal.    Eyes: Normal external exam. Pupils were equal in size, round, reactive to light (PERRL) with normal accommodation and extraocular movements intact (EOMI).   Ears, Nose, Mouth, and Throat: External inspection of ears and nose: Normal.  Hearing: Normal.  Nasal mucosa, septum, and turbinates: Normal.  Lips, teeth, and gums: Normal.  Oropharynx: Normal.   Neck: No neck mass was observed. Supple. Thyroid not enlarged and there were no palpable thyroid nodules.   Cardiovascular: Heart rate and rhythm were normal, normal S1 and S2. Pedal pulses: Normal. No peripheral edema.   Pulmonary: No respiratory distress. Clear bilateral breath sounds.   Abdomen: Soft " nontender; no abdominal mass palpated. Normal bowel sounds. No organomegaly.   Musculoskeletal: No joint swelling seen, normal movements of all extremities. Range of motion: Normal.  Muscle strength/tone: Normal.    Skin: Normal skin color and pigmentation, normal skin turgor, and no rash.   Neurologic: Deep tendon reflexes were 2+ and symmetric.   Psychiatric: Judgment and insight: Intact. Mood and affect: Normal.  Lymphatic: No cervical lymphadenopathy. Palpation of lymph nodes in axillae: Normal.  Palpation of lymph nodes in groin: Normal.    Lab Results   Component Value Date    WBC 2.9 (L) 09/01/2023    HGB 9.3 (L) 09/01/2023    HCT 29.7 (L) 09/01/2023     09/01/2023    CHOL 188 08/19/2022    TRIG 111 08/19/2022    HDL 52.3 08/19/2022    ALT 12 09/01/2023    AST 13 09/01/2023     09/01/2023    K 3.7 09/01/2023     09/01/2023    CREATININE 0.80 09/01/2023    BUN 9 09/01/2023    CO2 27 09/01/2023    TSH 2.29 09/01/2023    INR 1.1 10/27/2022    HGBA1C 5.8 (H) 02/09/2023       Electrocardiogram 12 Lead  Please see ED Provider Note for formal interpretation  Confirmed by Shivam Morgan ([id number]) on 8/7/2023 3:57:11 AM      Assessment/Plan   Diagnoses and all orders for this visit:  Medicare annual wellness visit, subsequent  Hidradenitis suppurativa  -     Comprehensive Metabolic Panel; Future  Breast cancer metastasized to axillary lymph node, left (CMS/HCC)  Acute deep vein thrombosis (DVT) of distal vein of left lower extremity (CMS/HCC)  Vitamin D deficiency  Goiter  -     TSH with reflex to Free T4 if abnormal; Future  Other iron deficiency anemia  -     Ferritin; Future  -     Iron and TIBC; Future  -     Vitamin B12; Future  Moderate mixed hyperlipidemia not requiring statin therapy  -     Lipid Panel; Future  Elevated blood sugar  -     Hemoglobin A1C; Future  Class 1 obesity due to excess calories with serious comorbidity and body mass index (BMI) of 31.0 to 31.9 in adult  Need for  hepatitis C screening test  -     Hepatitis C antibody; Future  Screening for HIV without presence of risk factors  -     HIV-1 and HIV-2 antibodies; Future  Routine general medical examination at health care facility        Dear Sharonda Yeboah     It was my pleasure to take care of you today in the office. Below are the things we discussed today:    1. 1. Immunizations: Yearly Flu shot is recommended. Declined by the patient          a: COVID: Please get booster from the pharmacy          b: Tetanus: Up-to-date         c: Shingrix: Declined by the patient          d: Pneumovax: Declined by the patient          e: Prevnar: Declined by the patient     2. Blood Work: Ordered   3. Seen your dentist twice a year  4. Yearly Eye exam is recommended    5. BMI: Obese   6: Diet recommendations:   Eat Clean, Try to have as many home cooked meals as possible  Avoid processed foods which contain excess calories, sugar, and sodium.    7. Exercise recommendations:   150 minutes a week to maintain your weight     If you have to loose weight, you need a better diet and exercise plan.     8. Supplements recommended:  a - Calcium 600 mg up to twice a day to get a total of 1200 mg. Each 8 oz of milk or yogurt or 1 oz of cheese, 1 Banana, 1 serving of green Leafy vegetable has about 300 mg of Calcium, so you may subtract that amount. Calcium citrate is the only acceptable supplement to take if you take an acid suppressing medication like Prilosec; otherwise Calcium carbonate is acceptable too (It can cause Constipation).   b - Vitamin D - 2000 IU daily     9. Please get your Living will / Advance directive completed if you do not have one already. Please make sure our office has a copy of the latest one.     10. Colonoscopy: Uptodate. Last done in Aug 2017, repeat in Aug 2027  11. Mammogram: Uptodate. Patient is following up with breast surgery   12. PAP: Last done in Set 2022. Cytology and HPV negative   13. DEXA: Not indicated   14:  Skin Check: Please see Dermatology once a year for a Skin Check.     15. Breast cancer: The patient is following up with breast surgery.     16. Hidradenitis: Continue minocycline.     17. History of DVT: Follow up with Dr. Hamlin. Patient on eliquis    18. Iron deficiency anemia: Advised the patient to follow up with Hematology.     Follow up in one year for a Physical. Please call the office before your Physical to see if you need blood work completed prior to your physical.     Please call me if any questions arise from now until your next visit. I will call you after I am done seeing patients. A Doctor is always available by phone when the office is closed. Please feel free to call for help with any problem that you feel shouldn't wait until the office re-opens.     Scribe Attestation  By signing my name below, IYaz, Scrsofia   attest that this documentation has been prepared under the direction and in the presence of Pati Mchugh MD.     abdominal pain

## 2025-07-07 ENCOUNTER — TELEPHONE (OUTPATIENT)
Dept: PLASTIC SURGERY | Facility: CLINIC | Age: 55
End: 2025-07-07
Payer: MEDICARE

## 2025-07-07 NOTE — PROGRESS NOTES
Patient ID: Sharonda Yeboah is a 55 y.o. female.  Referring Physician: Celina Nieves PA-C  [id number] [place] [address]  Primary Care Provider: Pati Mchugh MD  Visit Type: Initial Visit      Subjective    HPI  Ms. Yeboah is a 56YO F w/ hx of Stage III Breast Ca (EOT 8/2023) and recurrent DVT who presents for DVT. In review with patient, diagnosed with breast ca in late 2022. During treatment she developed a DVT. She was on anticoagulation for 6 months and then stopped as was provoked. She then underwent a knee replacement end of last year and unfortunately again developed a DVT. She does not recall being on prophylactic anticoagulation. She was seen at [facility] and had a limited hypercoag workup done which was neg. She maintains on eliquis currently    Review of Systems - Oncology   A complete ROS was obtained and is otherwise neg except as noted in HPI    Objective   BSA: 2.03 meters squared  /72 (BP Location: Right arm, Patient Position: Sitting, BP Cuff Size: Large adult)   Pulse 74   Temp 36 °C (96.8 °F) (Temporal)   Resp 18   Wt 92.9 kg (204 lb 12.9 oz)   SpO2 100%   BMI 36.29 kg/m²      has a past medical history of Abnormal levels of other serum enzymes (04/07/2020), Acute candidiasis of vulva and vagina (12/23/2019), Breast cancer (05/01/2023), Change in bowel habit (07/19/2017), Contact with and (suspected) exposure to potentially hazardous body fluids (07/25/2017), Dorsalgia, unspecified (06/17/2015), Encounter for follow-up examination after completed treatment for conditions other than malignant neoplasm (02/17/2018), Encounter for screening for respiratory tuberculosis (07/25/2017), Endometriosis, unspecified (10/18/2022), Fever, unspecified (12/28/2021), Functional intestinal disorder, unspecified (10/18/2022), Generalized abdominal pain (06/06/2017), antineoplastic chemo, Low back pain, unspecified (10/18/2022), Lymphedema syndrome, postmastectomy (11/03/2023), Other  conditions influencing health status, Other conditions influencing health status, Other specified cough (2019), Pain in left knee (2016), Personal history of diseases of the blood and blood-forming organs and certain disorders involving the immune mechanism (2017), Personal history of diseases of the blood and blood-forming organs and certain disorders involving the immune mechanism (2017), Personal history of diseases of the skin and subcutaneous tissue (2017), Personal history of irradiation, Personal history of other diseases of the digestive system (2017), Personal history of other diseases of the digestive system, Personal history of other diseases of the musculoskeletal system and connective tissue (2017), Personal history of other diseases of the musculoskeletal system and connective tissue (2017), Personal history of other diseases of the nervous system and sense organs, Personal history of other diseases of the respiratory system (2020), Personal history of other diseases of the respiratory system (2021), Personal history of other diseases of the respiratory system (2019), Personal history of other mental and behavioral disorders (10/18/2022), Personal history of other specified conditions (2017), Personal history of other specified conditions (2017), Personal history of other specified conditions (10/23/2020), Personal history of other specified conditions (2017), Personal history of other specified conditions (2018), Plantar fascial fibromatosis (2014), Sprain of unspecified rotator cuff capsule, initial encounter (10/18/2022), and Tobacco abuse counseling (2017).   has a past surgical history that includes Other surgical history (2013); Other surgical history (2013); Cholecystectomy (2013); Umbilical hernia repair (2013); Other surgical history (2013);  section,  classic (10/18/2022); Hernia repair (10/18/2022); Tubal ligation (10/18/2022); Rotator cuff repair (10/18/2022); Other surgical history (10/18/2022); Shoulder surgery (03/22/2014); Hysteroscopy (03/22/2014); CT angio abdomen pelvis w and or wo IV IV contrast (03/31/2023); and Breast lumpectomy (Left, 05/01/2023).  Family History[1]  Oncology History    No history exists.       Sharonda Yeboah  reports that she quit smoking about 2 years ago. Her smoking use included cigarettes. She started smoking about 7 years ago. She has a 1.3 pack-year smoking history. She has never used smokeless tobacco.  She  reports current alcohol use.  She  reports no history of drug use.    Physical Exam  GEN: Aox3, NAD  HEENT EOMI PERRLA sclera anicteric  CV RRR w/o m/r/g  Resp CTAB w/o w/r/r  GI Soft NTND+BS  Ext no LE edema      Labs: all labs reviewed  WBC   Date/Time Value Ref Range Status   11/08/2024 12:45 PM 6.1 4.4 - 11.3 x10*3/uL Final   04/04/2024 11:54 AM 5.2 4.4 - 11.3 x10*3/uL Final   01/05/2024 11:10 AM 5.3 4.4 - 11.3 x10*3/uL Final     WHITE BLOOD CELL COUNT   Date/Time Value Ref Range Status   01/28/2025 03:17 PM 6.5 3.8 - 10.8 Thousand/uL Final     nRBC   Date Value Ref Range Status   11/08/2024 0.0 0.0 - 0.0 /100 WBCs Final   04/04/2024 0.0 0.0 - 0.0 /100 WBCs Final   01/05/2024 0.0 0.0 - 0.0 /100 WBCs Final     RBC   Date Value Ref Range Status   11/08/2024 4.11 4.00 - 5.20 x10*6/uL Final   04/04/2024 4.19 4.00 - 5.20 x10*6/uL Final   01/05/2024 3.87 (L) 4.00 - 5.20 x10*6/uL Final     RED BLOOD CELL COUNT   Date Value Ref Range Status   01/28/2025 3.87 3.80 - 5.10 Million/uL Final     Hemoglobin   Date Value Ref Range Status   11/08/2024 11.1 (L) 12.0 - 16.0 g/dL Final   04/04/2024 11.6 (L) 12.0 - 16.0 g/dL Final   01/05/2024 10.6 (L) 12.0 - 16.0 g/dL Final     HEMOGLOBIN   Date Value Ref Range Status   01/28/2025 10.1 (L) 11.7 - 15.5 g/dL Final     Hematocrit   Date Value Ref Range Status   11/08/2024 35.2 (L) 36.0 -  46.0 % Final   04/04/2024 35.7 (L) 36.0 - 46.0 % Final   01/05/2024 33.0 (L) 36.0 - 46.0 % Final     HEMATOCRIT   Date Value Ref Range Status   01/28/2025 33.9 (L) 35.0 - 45.0 % Final     MCV   Date/Time Value Ref Range Status   01/28/2025 03:17 PM 87.6 80.0 - 100.0 fL Final   11/08/2024 12:45 PM 86 80 - 100 fL Final   04/04/2024 11:54 AM 85 80 - 100 fL Final   01/05/2024 11:10 AM 85 80 - 100 fL Final     MCH   Date/Time Value Ref Range Status   01/28/2025 03:17 PM 26.1 (L) 27.0 - 33.0 pg Final   11/08/2024 12:45 PM 27.0 26.0 - 34.0 pg Final   04/04/2024 11:54 AM 27.7 26.0 - 34.0 pg Final   01/05/2024 11:10 AM 27.4 26.0 - 34.0 pg Final     MCHC   Date/Time Value Ref Range Status   01/28/2025 03:17 PM 29.8 (L) 32.0 - 36.0 g/dL Final     Comment:     For adults, a slight decrease in the calculated MCHC  value (in the range of 30 to 32 g/dL) is most likely  not clinically significant; however, it should be  interpreted with caution in correlation with other  red cell parameters and the patient's clinical  condition.     11/08/2024 12:45 PM 31.5 (L) 32.0 - 36.0 g/dL Final   04/04/2024 11:54 AM 32.5 32.0 - 36.0 g/dL Final   01/05/2024 11:10 AM 32.1 32.0 - 36.0 g/dL Final     RDW   Date/Time Value Ref Range Status   01/28/2025 03:17 PM 16.5 (H) 11.0 - 15.0 % Final   11/08/2024 12:45 PM 15.4 (H) 11.5 - 14.5 % Final   04/04/2024 11:54 AM 15.9 (H) 11.5 - 14.5 % Final   01/05/2024 11:10 AM 16.5 (H) 11.5 - 14.5 % Final     Platelets   Date/Time Value Ref Range Status   11/08/2024 12:45  150 - 450 x10*3/uL Final   04/04/2024 11:54  150 - 450 x10*3/uL Final   01/05/2024 11:10  150 - 450 x10*3/uL Final     PLATELET COUNT   Date/Time Value Ref Range Status   01/28/2025 03:17  (H) 140 - 400 Thousand/uL Final     MPV   Date/Time Value Ref Range Status   01/28/2025 03:17 PM 10.3 7.5 - 12.5 fL Final     Neutrophils %   Date/Time Value Ref Range Status   04/04/2024 11:54 AM 69.8 40.0 - 80.0 % Final   01/05/2024  11:10 AM 73.1 40.0 - 80.0 % Final   09/01/2023 10:25 AM 74.3 40.0 - 80.0 % Final   08/04/2023 03:10 PM 63.1 40.0 - 80.0 % Final   07/21/2023 11:07 AM 73.7 40.0 - 80.0 % Final     Immature Granulocytes %, Automated   Date/Time Value Ref Range Status   04/04/2024 11:54 AM 0.2 0.0 - 0.9 % Final     Comment:     Immature Granulocyte Count (IG) includes promyelocytes, myelocytes and metamyelocytes but does not include bands. Percent differential counts (%) should be interpreted in the context of the absolute cell counts (cells/UL).   01/05/2024 11:10 AM 0.4 0.0 - 0.9 % Final     Comment:     Immature Granulocyte Count (IG) includes promyelocytes, myelocytes and metamyelocytes but does not include bands. Percent differential counts (%) should be interpreted in the context of the absolute cell counts (cells/UL).   09/01/2023 10:25 AM 0.7 0.0 - 0.9 % Final     Comment:      Immature Granulocyte Count (IG) includes promyelocytes,    myelocytes and metamyelocytes but does not include bands.   Percent differential counts (%) should be interpreted in the   context of the absolute cell counts (cells/L).     08/04/2023 06:44 PM 0.6 0.0 - 0.9 % Final     Comment:      Immature Granulocyte Count (IG) includes promyelocytes,    myelocytes and metamyelocytes but does not include bands.   Percent differential counts (%) should be interpreted in the   context of the absolute cell counts (cells/L).     08/04/2023 03:10 PM 0.6 0.0 - 0.9 % Final     Comment:      Immature Granulocyte Count (IG) includes promyelocytes,    myelocytes and metamyelocytes but does not include bands.   Percent differential counts (%) should be interpreted in the   context of the absolute cell counts (cells/L).       Lymphocytes %   Date/Time Value Ref Range Status   04/04/2024 11:54 AM 22.1 13.0 - 44.0 % Final   01/05/2024 11:10 AM 16.9 13.0 - 44.0 % Final   09/01/2023 10:25 AM 13.7 13.0 - 44.0 % Final   08/04/2023 06:44 PM 4.2 13.0 - 44.0 % Final   08/04/2023  03:10 PM 11.0 13.0 - 44.0 % Final   07/21/2023 11:07 AM 17.6 13.0 - 44.0 % Final     Monocytes %   Date/Time Value Ref Range Status   04/04/2024 11:54 AM 7.1 2.0 - 10.0 % Final   01/05/2024 11:10 AM 8.3 2.0 - 10.0 % Final   09/01/2023 10:25 AM 9.5 2.0 - 10.0 % Final   08/04/2023 06:44 PM 5.1 2.0 - 10.0 % Final   08/04/2023 03:10 PM 24.7 2.0 - 10.0 % Final   07/21/2023 11:07 AM 7.9 2.0 - 10.0 % Final     Eosinophils %   Date/Time Value Ref Range Status   04/04/2024 11:54 AM 0.4 0.0 - 6.0 % Final   01/05/2024 11:10 AM 0.9 0.0 - 6.0 % Final   09/01/2023 10:25 AM 1.1 0.0 - 6.0 % Final   08/04/2023 06:44 PM 0.8 0.0 - 6.0 % Final   08/04/2023 03:10 PM 0.3 0.0 - 6.0 % Final   07/21/2023 11:07 AM 0.2 0.0 - 6.0 % Final     Basophils %   Date/Time Value Ref Range Status   04/04/2024 11:54 AM 0.4 0.0 - 2.0 % Final   01/05/2024 11:10 AM 0.4 0.0 - 2.0 % Final   09/01/2023 10:25 AM 0.7 0.0 - 2.0 % Final   08/04/2023 06:44 PM 0.0 0.0 - 2.0 % Final   08/04/2023 03:10 PM 0.3 0.0 - 2.0 % Final   07/21/2023 11:07 AM 0.3 0.0 - 2.0 % Final     Neutrophils Absolute   Date/Time Value Ref Range Status   04/04/2024 11:54 AM 3.64 1.20 - 7.70 x10*3/uL Final     Comment:     Percent differential counts (%) should be interpreted in the context of the absolute cell counts (cells/uL).   01/05/2024 11:10 AM 3.90 1.20 - 7.70 x10*3/uL Final     Comment:     Percent differential counts (%) should be interpreted in the context of the absolute cell counts (cells/uL).   09/01/2023 10:25 AM 2.12 1.20 - 7.70 x10E9/L Final   08/04/2023 03:10 PM 1.94 1.20 - 7.70 x10E9/L Final   07/21/2023 11:07 AM 4.36 1.20 - 7.70 x10E9/L Final     Immature Granulocytes Absolute, Automated   Date/Time Value Ref Range Status   04/04/2024 11:54 AM 0.01 0.00 - 0.70 x10*3/uL Final   01/05/2024 11:10 AM 0.02 0.00 - 0.70 x10*3/uL Final     Lymphocytes Absolute   Date/Time Value Ref Range Status   04/04/2024 11:54 AM 1.15 (L) 1.20 - 4.80 x10*3/uL Final   01/05/2024 11:10 AM 0.90  "(L) 1.20 - 4.80 x10*3/uL Final   09/01/2023 10:25 AM 0.39 (L) 1.20 - 4.80 x10E9/L Final   08/04/2023 03:10 PM 0.34 (L) 1.20 - 4.80 x10E9/L Final   07/21/2023 11:07 AM 1.04 (L) 1.20 - 4.80 x10E9/L Final     Monocytes Absolute   Date/Time Value Ref Range Status   04/04/2024 11:54 AM 0.37 0.10 - 1.00 x10*3/uL Final   01/05/2024 11:10 AM 0.44 0.10 - 1.00 x10*3/uL Final   09/01/2023 10:25 AM 0.27 0.10 - 1.00 x10E9/L Final   08/04/2023 03:10 PM 0.76 0.10 - 1.00 x10E9/L Final   07/21/2023 11:07 AM 0.47 0.10 - 1.00 x10E9/L Final     Eosinophils Absolute   Date/Time Value Ref Range Status   04/04/2024 11:54 AM 0.02 0.00 - 0.70 x10*3/uL Final   01/05/2024 11:10 AM 0.05 0.00 - 0.70 x10*3/uL Final   09/01/2023 10:25 AM 0.03 0.00 - 0.70 x10E9/L Final   08/04/2023 06:44 PM 0.03 0.00 - 0.70 x10E9/L Final   08/04/2023 03:10 PM 0.01 0.00 - 0.70 x10E9/L Final   07/21/2023 11:07 AM 0.01 0.00 - 0.70 x10E9/L Final     Basophils Absolute   Date/Time Value Ref Range Status   04/04/2024 11:54 AM 0.02 0.00 - 0.10 x10*3/uL Final   01/05/2024 11:10 AM 0.02 0.00 - 0.10 x10*3/uL Final   09/01/2023 10:25 AM 0.02 0.00 - 0.10 x10E9/L Final   08/04/2023 06:44 PM 0.00 0.00 - 0.10 x10E9/L Final   08/04/2023 03:10 PM 0.01 0.00 - 0.10 x10E9/L Final     Comment:     Automated WBC differential has been confirmed by manual smear.   07/21/2023 11:07 AM 0.02 0.00 - 0.10 x10E9/L Final       No components found for: \"PT\"  aPTT   Date/Time Value Ref Range Status   01/05/2024 11:10 AM 35 27 - 38 seconds Final   10/27/2022 11:57 AM 33 26 - 39 sec Final     Comment:       THE APTT IS NO LONGER USED FOR MONITORING     UNFRACTIONATED HEPARIN THERAPY.    FOR MONITORING HEPARIN THERAPY,     USE THE HEPARIN ASSAY.         Assessment/Plan    Ms. Yeboah is a 56YO F w/ hx of recurrent provoked DVT who presents for DVT    # DVT - reviewing patients history, she had a DVT in 2022 while undergoing treatment for her then active breast cancer history. She completed a full " course of A/C and was then taken off. She unfortunately had a recurrent DVT in September 2024 following a knee surgery, did not receive post-op AC. Had Hypercoag work up done which was neg  - in totality, given that both DVT episodes were provoked. She likely can come off safely from anticoagulation. Though she is at higher risk for recurrence, particularly if undergoing surgery or prolonged flights and prophylaxis should be used in those settings. We can obtain a d-dimer while off AC and if elevated could consider ppx dosing for extended therapy            Jasson Macdonald DO       [1]   Family History  Problem Relation Name Age of Onset    Hypertension Mother      Diabetes type II Mother

## 2025-07-07 NOTE — TELEPHONE ENCOUNTER
RN reached out to Sharonda regarding request for lymphedema surgery consult. They are agreeable to answering triage questions at this time.    General Health Questions:  -Height: 5' 3''  -Weight: 204lbs   -BMI: 36.29  -Allergies to shellfish or iodine no  -Current smoker: no    Additional Health related questions:  -DM? Last A1C?  N/A  -History of stroke or heart attack? N/A  -Have you ever been diagnosed or treated for blood clots in the legs or lungs? Yes   -Any history of organ transplant? No  -Are you currently taking any immunosuppressant medications? No   -Other relevant medication comorbidities our team should be aware of? N/A    Lymphedema/Lipedema Diagnosis:  -Where is your swelling located? Left upper extremity and occasionally left breast   -How were you diagnosed with lymphedema and/or lipedema?  Post op after lumpectomy   -Have you been evaluated by a Vascular Medicine Team? No  -Have you ever been diagnosed with venous insufficiency? No   -Have you had imaging that looked at your lymphatic system? NM test ordered not completed     Lymphedema/Lipedema Management:  -Have you ever attempted Lymphedema therapy?  Yes   -Do you wear compression garments or use a pneumatic compression pump? Compression garments pump ordered

## 2025-07-10 ENCOUNTER — APPOINTMENT (OUTPATIENT)
Dept: PHYSICAL THERAPY | Facility: CLINIC | Age: 55
End: 2025-07-10
Payer: MEDICARE

## 2025-07-10 ENCOUNTER — DOCUMENTATION (OUTPATIENT)
Dept: PHYSICAL THERAPY | Facility: CLINIC | Age: 55
End: 2025-07-10

## 2025-07-10 ENCOUNTER — OFFICE VISIT (OUTPATIENT)
Dept: PLASTIC SURGERY | Facility: HOSPITAL | Age: 55
End: 2025-07-10
Payer: MEDICARE

## 2025-07-10 VITALS
TEMPERATURE: 97 F | OXYGEN SATURATION: 98 % | BODY MASS INDEX: 36.29 KG/M2 | HEART RATE: 72 BPM | WEIGHT: 204.81 LBS | HEIGHT: 63 IN | RESPIRATION RATE: 18 BRPM | DIASTOLIC BLOOD PRESSURE: 75 MMHG | SYSTOLIC BLOOD PRESSURE: 124 MMHG

## 2025-07-10 DIAGNOSIS — I97.2 LYMPHEDEMA SYNDROME, POSTMASTECTOMY: Primary | ICD-10-CM

## 2025-07-10 DIAGNOSIS — Z92.3 S/P RADIATION THERAPY: ICD-10-CM

## 2025-07-10 DIAGNOSIS — Z01.818 PRE-OP EXAM: ICD-10-CM

## 2025-07-10 DIAGNOSIS — N64.89 ACQUIRED BREAST DEFORMITY: ICD-10-CM

## 2025-07-10 DIAGNOSIS — I89.0 LYMPHEDEMA OF BREAST: ICD-10-CM

## 2025-07-10 PROCEDURE — 99213 OFFICE O/P EST LOW 20 MIN: CPT

## 2025-07-10 PROCEDURE — 99203 OFFICE O/P NEW LOW 30 MIN: CPT

## 2025-07-10 PROCEDURE — 3008F BODY MASS INDEX DOCD: CPT

## 2025-07-10 ASSESSMENT — PAIN SCALES - GENERAL: PAINLEVEL_OUTOF10: 0-NO PAIN

## 2025-07-10 NOTE — PROGRESS NOTES
Physical Therapy                 Therapy Communication Note    Patient Name: Sharonda Yeboah  MRN: [id number]  Department: [facility]  Room: Room/bed info not found  Today's Date: 7/10/2025     Discipline: Physical Therapy    Missed Visit:       Missed Visit Reason:  canceled due to family emergency    Missed Time: Cancel    Comment:

## 2025-07-18 ENCOUNTER — PATIENT MESSAGE (OUTPATIENT)
Dept: DERMATOLOGY | Facility: HOSPITAL | Age: 55
End: 2025-07-18

## 2025-07-18 ENCOUNTER — TELEPHONE (OUTPATIENT)
Dept: DERMATOLOGY | Facility: CLINIC | Age: 55
End: 2025-07-18
Payer: MEDICARE

## 2025-07-18 DIAGNOSIS — L73.2 HIDRADENITIS SUPPURATIVA: Primary | ICD-10-CM

## 2025-07-23 ENCOUNTER — HOSPITAL ENCOUNTER (OUTPATIENT)
Dept: RADIOLOGY | Facility: HOSPITAL | Age: 55
End: 2025-07-23
Payer: MEDICARE

## 2025-07-23 ENCOUNTER — HOSPITAL ENCOUNTER (OUTPATIENT)
Dept: RADIOLOGY | Facility: HOSPITAL | Age: 55
Discharge: HOME | End: 2025-07-23
Payer: MEDICARE

## 2025-07-23 DIAGNOSIS — C77.3 BREAST CANCER METASTASIZED TO AXILLARY LYMPH NODE, LEFT: ICD-10-CM

## 2025-07-23 DIAGNOSIS — Z86.718 HX OF DEEP VENOUS THROMBOSIS: ICD-10-CM

## 2025-07-23 DIAGNOSIS — I89.0 LYMPHEDEMA OF BREAST: ICD-10-CM

## 2025-07-23 DIAGNOSIS — C50.912 BREAST CANCER METASTASIZED TO AXILLARY LYMPH NODE, LEFT: ICD-10-CM

## 2025-07-23 DIAGNOSIS — I97.2 LYMPHEDEMA SYNDROME, POSTMASTECTOMY: ICD-10-CM

## 2025-07-23 DIAGNOSIS — N64.89 ACQUIRED BREAST DEFORMITY: ICD-10-CM

## 2025-07-23 DIAGNOSIS — Z01.818 PRE-OP EXAM: ICD-10-CM

## 2025-07-23 DIAGNOSIS — Z92.3 S/P RADIATION THERAPY: ICD-10-CM

## 2025-07-23 PROCEDURE — 74174 CTA ABD&PLVS W/CONTRAST: CPT

## 2025-07-23 PROCEDURE — A9520 TC99 TILMANOCEPT DIAG 0.5MCI: HCPCS | Performed by: PHYSICAL MEDICINE & REHABILITATION

## 2025-07-23 PROCEDURE — 2550000001 HC RX 255 CONTRASTS

## 2025-07-23 PROCEDURE — 78195 LYMPH SYSTEM IMAGING: CPT | Performed by: RADIOLOGY

## 2025-07-23 PROCEDURE — 3430000001 HC RX 343 DIAGNOSTIC RADIOPHARMACEUTICALS: Performed by: PHYSICAL MEDICINE & REHABILITATION

## 2025-07-23 PROCEDURE — 78195 LYMPH SYSTEM IMAGING: CPT

## 2025-07-23 RX ADMIN — Medication 0.67 MILLICURIE: at 11:07

## 2025-07-23 RX ADMIN — IOHEXOL 83 ML: 350 INJECTION, SOLUTION INTRAVENOUS at 14:50

## 2025-07-25 ENCOUNTER — TELEPHONE (OUTPATIENT)
Dept: RADIATION ONCOLOGY | Facility: HOSPITAL | Age: 55
End: 2025-07-25

## 2025-07-25 ENCOUNTER — OFFICE VISIT (OUTPATIENT)
Dept: PRIMARY CARE | Facility: CLINIC | Age: 55
End: 2025-07-25
Payer: MEDICARE

## 2025-07-25 VITALS
SYSTOLIC BLOOD PRESSURE: 122 MMHG | HEIGHT: 63 IN | HEART RATE: 82 BPM | BODY MASS INDEX: 35.79 KG/M2 | DIASTOLIC BLOOD PRESSURE: 86 MMHG | OXYGEN SATURATION: 98 % | WEIGHT: 202 LBS

## 2025-07-25 DIAGNOSIS — R73.9 ELEVATED BLOOD SUGAR: ICD-10-CM

## 2025-07-25 DIAGNOSIS — E66.01 CLASS 2 SEVERE OBESITY DUE TO EXCESS CALORIES WITH SERIOUS COMORBIDITY AND BODY MASS INDEX (BMI) OF 35.0 TO 35.9 IN ADULT: ICD-10-CM

## 2025-07-25 DIAGNOSIS — L73.2 HIDRADENITIS SUPPURATIVA: Primary | ICD-10-CM

## 2025-07-25 DIAGNOSIS — E66.812 CLASS 2 SEVERE OBESITY DUE TO EXCESS CALORIES WITH SERIOUS COMORBIDITY AND BODY MASS INDEX (BMI) OF 35.0 TO 35.9 IN ADULT: ICD-10-CM

## 2025-07-25 PROBLEM — Z01.818 PRE-OP EXAM: Status: RESOLVED | Noted: 2025-07-10 | Resolved: 2025-07-25

## 2025-07-25 PROBLEM — E55.9 VITAMIN D DEFICIENCY: Status: RESOLVED | Noted: 2023-04-12 | Resolved: 2025-07-25

## 2025-07-25 PROCEDURE — G2211 COMPLEX E/M VISIT ADD ON: HCPCS | Performed by: FAMILY MEDICINE

## 2025-07-25 PROCEDURE — 3008F BODY MASS INDEX DOCD: CPT | Performed by: FAMILY MEDICINE

## 2025-07-25 PROCEDURE — 99214 OFFICE O/P EST MOD 30 MIN: CPT | Performed by: FAMILY MEDICINE

## 2025-07-25 NOTE — ASSESSMENT & PLAN NOTE
She is on Bactrim. Following with Dermatology.   Ordered blood work.  Orders:    Comprehensive Metabolic Panel; Future

## 2025-07-25 NOTE — PROGRESS NOTES
"Subjective   Patient ID: Sharonda Yeboah is a 55 y.o. female who presents for New Med Request.    HPI     The patient is on omeprazole for GERD, Eliquis, Bactrim. She is taking these medications as prescribed, tolerating them well without any side effects..  She is s/p stage III breast CA 8/2023 following lumpectomy, axillary lymph nodes removal, chemotherapy and radiation. She is following with Oncology and Plastic Surgery who recommended total breast reconstruction.  She has had DVT after knee surgery in 9/2024 and is on anticoagulation.      She presented to ED on 6/24 for a left axillary abscess. She has a history of hidradenitis, was on systemic therapy but she developed C diff. Abscess was drained.    Encouraged warm moist compresses.  If no improvement in 2 days, she can try filling bactrim but must take with probiotics. Followed with Dermatology on 7/2/2025 and was placed on Bactrim x1 month.      Blood pressure is stable at this time.  She states Bactrim is not effective and with  Humira she developed psoriasis in the past. Dermatology recommended GLP-1 receptor agonists for weight loss.     Her last A1c was 5.3.        Review of Systems  Constitutional: No fever or chills  Cardiovascular: no chest pain, no palpitations and no syncope.   Respiratory: no cough, no shortness of breath during exertion and no shortness of breath at rest.   Gastrointestinal: no abdominal pain, no nausea and no vomiting.  Neuro: No Headache, no dizziness    Objective   /86   Pulse 82   Ht 1.6 m (5' 2.99\")   Wt 91.6 kg (202 lb)   SpO2 98%   BMI 35.79 kg/m²     Physical Exam  Constitutional: Alert and in no acute distress. Well developed, well nourished  Head and Face: Head and face: Normal.    Cardiovascular: Heart rate and rhythm were normal, normal S1 and S2. No peripheral edema.   Pulmonary: No respiratory distress. Clear bilateral breath sounds.  Musculoskeletal: Gait and station: Normal. Muscle strength/tone: " Normal.   Skin: Normal skin color and pigmentation, normal skin turgor, and no rash.    Psychiatric: Judgment and insight: Intact. Mood and affect: Normal.      Lab Results   Component Value Date    WBC 6.5 01/28/2025    HGB 10.1 (L) 01/28/2025    HCT 33.9 (L) 01/28/2025     (H) 01/28/2025    CHOL 196 11/08/2024    TRIG 114 11/08/2024    HDL 59.3 11/08/2024    ALT 9 01/28/2025    AST 11 01/28/2025     01/28/2025    K 4.1 01/28/2025     01/28/2025    CREATININE 0.80 01/28/2025    BUN 9 01/28/2025    CO2 27 01/28/2025    TSH 2.52 11/08/2024    INR 1.2 (H) 01/05/2024    HGBA1C 5.3 11/08/2024       NM lymphoscintigram  Narrative: Interpreted By:  Anupam Lake and Igbinovia Osato   STUDY:  NM LYMPHOSCINTIGRAM;  7/23/2025 1:00 pm      INDICATION:  Signs/Symptoms:left breast and arm lymphedema.      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      ACCESSION NUMBER(S):  [id number]      ORDERING CLINICIAN:  BROOK SMITH      TECHNIQUE:  DIVISION OF NUCLEAR MEDICINE  RADIONUCLIDE LYMPHANGIOGRAPHY, LOWER EXTREMITY      A total of 0.67 millicuries of Tc-99m microfiltered sulfur colloid  was injected into the dorsum of the hands bilaterally. Sequential  images were then acquired to assess lymphatic transit through the  upper extremities. Multiple static images were obtained up to 90  minutes post injection.      FINDINGS:  Analysis of the images reveals noted movement of radiotracer from the  dorsum of the hands through the region of the elbows. Foci identified  in the region of the elbows slightly greater on the left side  compared to the right. In the region  of the axilla, note is made of  foci in the bilateral axilla, right greater than left.          Impression: Radiotracer activity present in the axillary lymph nodes bilaterally  consistent with no definite complete obstruction. However, note is  made of relative decreased activity in the left axilla relative to  the right axilla. Some aspect of a partial obstruction within the  left upper extremity near the axilla cannot be excluded.      I personally reviewed the images/study and I agree with the findings  as stated.  This study was interpreted at [facility], [place], [place].      MACRO:  None      Signed by: Anupam Lake 7/23/2025 2:23 PM  Dictation workstation:   [id number]      Assessment/Plan   Assessment & Plan  Hidradenitis suppurativa  She is on Bactrim. Following with Dermatology.   Ordered blood work.  Orders:    Comprehensive Metabolic Panel; Future    Elevated blood sugar  Ordered blood work.  Orders:    Hemoglobin A1C; Future    Class 2 severe obesity due to excess calories with serious comorbidity and body mass index (BMI) of 35.0 to 35.9 in adult     Recommend regular exercises and follow a healthy calorie-restricted diet, try to eat in smaller portions, eat early, eat only when you are hungry and avoid snacking.                    Your yearly Physical is due in:  Nov 2025   When you call the office for your yearly Physical, please ask them to inform me to order your blood work, so that you can get the fasting blood work before your appointment and we can discuss the results at your physical.      Please call me if any questions arise from now until your next visit. I will call you after I am done seeing patients. A Doctor is always available by phone when the office is closed. Please feel free to call for help with any problem that you feel shouldn't wait until the office re-opens.     I, Pati Mchugh MD, attest that this note for 7/25/2025 accurately reflects  documentation that my scribe Antonio De La Torre, made at my direction in my capacity as Pati Mchugh MD when I treated Sharonda Yeboah.      Scribe Attestation  By signing my name below, I, Zuly Zuniga   attest that this documentation has been prepared under the direction and in the presence of Pati Mchugh MD.

## 2025-07-28 ENCOUNTER — HOSPITAL ENCOUNTER (OUTPATIENT)
Dept: RADIATION ONCOLOGY | Facility: HOSPITAL | Age: 55
Setting detail: RADIATION/ONCOLOGY SERIES
Discharge: HOME | End: 2025-07-28
Payer: MEDICARE

## 2025-07-28 ENCOUNTER — TELEPHONE (OUTPATIENT)
Dept: PHYSICAL THERAPY | Facility: CLINIC | Age: 55
End: 2025-07-28
Payer: MEDICARE

## 2025-07-28 ENCOUNTER — APPOINTMENT (OUTPATIENT)
Dept: DERMATOLOGY | Facility: HOSPITAL | Age: 55
End: 2025-07-28
Payer: MEDICARE

## 2025-07-28 VITALS
RESPIRATION RATE: 18 BRPM | TEMPERATURE: 96.6 F | OXYGEN SATURATION: 100 % | DIASTOLIC BLOOD PRESSURE: 85 MMHG | HEART RATE: 64 BPM | WEIGHT: 204.7 LBS | SYSTOLIC BLOOD PRESSURE: 133 MMHG | BODY MASS INDEX: 36.27 KG/M2

## 2025-07-28 DIAGNOSIS — I97.2 LYMPHEDEMA SYNDROME, POSTMASTECTOMY: Primary | ICD-10-CM

## 2025-07-28 DIAGNOSIS — C50.912 MALIGNANT NEOPLASM OF LEFT BREAST IN FEMALE, ESTROGEN RECEPTOR NEGATIVE, UNSPECIFIED SITE OF BREAST: Primary | ICD-10-CM

## 2025-07-28 DIAGNOSIS — Z17.1 MALIGNANT NEOPLASM OF LEFT BREAST IN FEMALE, ESTROGEN RECEPTOR NEGATIVE, UNSPECIFIED SITE OF BREAST: Primary | ICD-10-CM

## 2025-07-28 PROCEDURE — 99213 OFFICE O/P EST LOW 20 MIN: CPT | Performed by: NURSE PRACTITIONER

## 2025-07-28 ASSESSMENT — COLUMBIA-SUICIDE SEVERITY RATING SCALE - C-SSRS
6. HAVE YOU EVER DONE ANYTHING, STARTED TO DO ANYTHING, OR PREPARED TO DO ANYTHING TO END YOUR LIFE?: NO
2. HAVE YOU ACTUALLY HAD ANY THOUGHTS OF KILLING YOURSELF?: NO
1. IN THE PAST MONTH, HAVE YOU WISHED YOU WERE DEAD OR WISHED YOU COULD GO TO SLEEP AND NOT WAKE UP?: NO

## 2025-07-28 ASSESSMENT — ENCOUNTER SYMPTOMS
PSYCHIATRIC NEGATIVE: 1
HEMATOLOGIC/LYMPHATIC NEGATIVE: 1
LOSS OF SENSATION IN FEET: 0
RESPIRATORY NEGATIVE: 1
DEPRESSION: 0
CARDIOVASCULAR NEGATIVE: 1
OCCASIONAL FEELINGS OF UNSTEADINESS: 0
MUSCULOSKELETAL NEGATIVE: 1
NEUROLOGICAL NEGATIVE: 1
CONSTITUTIONAL NEGATIVE: 1

## 2025-07-28 ASSESSMENT — PATIENT HEALTH QUESTIONNAIRE - PHQ9
SUM OF ALL RESPONSES TO PHQ9 QUESTIONS 1 AND 2: 0
2. FEELING DOWN, DEPRESSED OR HOPELESS: NOT AT ALL
1. LITTLE INTEREST OR PLEASURE IN DOING THINGS: NOT AT ALL

## 2025-07-28 ASSESSMENT — PAIN SCALES - GENERAL: PAINLEVEL_OUTOF10: 0-NO PAIN

## 2025-07-28 NOTE — PROGRESS NOTES
Radiation Oncology Follow-Up    Patient Name:  Sharonda Yeboah  MRN:  [id number]  :  1970      Primary Care Provider: Pati Mchugh MD  Care Team: Patient Care Team:  Pati Mchugh MD as PCP - General (Family Medicine)  Pati Mchugh MD as PCP - [employer] PCP  Mandie Reyes APRN-CNP as PCP - [facility] PCP  Celina Nieves PA-C as Physician Assistant (Hematology and Oncology)  Jasson Macdonald DO as Medical Oncologist (Hematology and Oncology)    Date of Service: 2025     Cancer Staging:          Breast         AJCC Edition: 8th (AJCC), Diagnosis Date: Oct 2022, IIIC, cT2 cN3b cM0 G3     Treatment Synopsis:    56 yo postmenopausal female with [id number] G3 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     Care Team:  Surgery: Dr. Zora Reyes  Med Onc: Dr. Abebe Hamlin  Rad Onc: Dr. Noel Hawkins     Treatment Summary:      -21: Bilateral screening mammogram was negative.  -Palpated left axillary lump. Saw PCP.  -10/5/22: Diagnostic mammogram/US showed a 3.6 x 3.0 x 3.4 cm irregular bilobed mass at 2:00, 7 cm FN. Four abnormal left axillary LN’s seen. In the right axilla, a 4.1 cm superficial fatty mass noted, likely a lipoma.  -10/12/22: US-guided CNB confirmed IDC, grade 3, ER/SD-negative Her2-negative (1+ IHC). A left axillary LN was positive for metastatic carcinoma.  -10/19/22: Breast MRI confirmed a 4.4 cm irregular mass with central necrosis in the left breast. Two bulky level 1 axillary LN’s seen (no level 2/3 LN’s identified). A peripheral rim-enhancing mass seen in left internal mammary region,  measuring 0.9 x 0.8 cm, concerning for malignant lymph node. Left axillary skin thickening seen.  -Neoadjuvant chemotherapy with KEYNOTE-522 regimen, 22-23. She required dose-reductions for  SBO's that were managed conservatively.  -5/24/23: Left lumpectomy/SLNBx performed. pathology: pCR   -7/21/23 - 8/17/23: Radiation therapy to the left breast, SCV consisting of a dose of 42.56 Gy with additional 10 Gy to the tumor bed for a total of 52.56 Gy.      Treatment History:    Neoadjuvant AC-T/carboplatin with pembrolizumab (KEYNOTE-522):  Carboplatin/paclitaxel/pembrolizumab (weekly)  C1D1 11/4/22  C2D1 11/25/22  - Week #5 (C2D8) delayed due to ER visit for partial SBO at prior anastomotic site. Managed conservatively.  C3D1 12/23/22  C4D1 1/13/23  Doxorubicin/cyclophosphamide/pembrolizumab Q3 weeks   C1D1 2/3/23; Complicated by partial SBO, managed conservatively.  C2D1 2/24/23; dose-reduced 20%. Complicated by LLE DVT.  C3D1 3/16/23.  C4D1 4/7/23    SUBJECTIVE  History of Present Illness:   Sharonda Yeboah is here today for routine radiation follow up/surveillance visit.  She is doing well however continues to have tenderness with some swelling in left breast. She wears a sports compression bra daily. She says the breast feels heavy. She is working with Dr. Parker and getting worked up by plastic surgery for removal and reconstruction. She said she had lab work and imaging. Needs to call to see next steps. Skin is overall intact. She suffers with long standing suppurative hydradenitis and sees dermatology routinely. She has developed flare up in left axilla. She has tried a few medications. Per patient Dr. Carter is trying to find something else to try . No swelling of left arm or difficulty with ROM.  Denies headaches,  fever, chills, cough, SOB, chest pain, N/V or bony pain. Due for mammogram in October.     Review of Systems:    Review of Systems   Constitutional: Negative.    Respiratory: Negative.     Cardiovascular: Negative.    Musculoskeletal: Negative.    Neurological: Negative.    Hematological: Negative.    Psychiatric/Behavioral: Negative.     All other systems reviewed and are  negative.    Performance Status:   The Karnofsky performance scale today is 90, Able to carry on normal activity; minor signs or symptoms of disease (ECOG equivalent 0).      OBJECTIVE    Current Outpatient Medications:     apixaban (Eliquis) 5 mg tablet, Take 1 tablet (5 mg) by mouth 2 times a day., Disp: 60 tablet, Rfl: 1    Bacillus coagulans 400 million cell tablet,chewable, Chew 1 tablet 2 times a day., Disp: 180 tablet, Rfl: 3    benzoyl peroxide (Benzac AC) 10 % external wash, Apply topically once daily. As directed leave on for 5 min then rinse, Disp: 227 g, Rfl: 11    biotin 1 mg tablet, Take 1 tablet (1,000 mcg) by mouth once daily., Disp: , Rfl:     calcium carbonate (Oscal) 500 mg calcium (1,250 mg) tablet, Take 1 tablet by mouth 2 times daily (morning and late afternoon)., Disp: , Rfl:     multivitamin tablet, Take 1 tablet by mouth once daily., Disp: , Rfl:     omeprazole (PriLOSEC) 40 mg DR capsule, Take 1 capsule (40 mg) by mouth once daily in the morning. Take before meals., Disp: 90 capsule, Rfl: 1    sulfamethoxazole-trimethoprim (Bactrim DS) 800-160 mg tablet, Take 1 tablet by mouth 2 times a day., Disp: 60 tablet, Rfl: 0     Physical Exam  Constitutional:       Appearance: Normal appearance.   HENT:      Head: Normocephalic and atraumatic.      Nose: Nose normal.      Mouth/Throat:      Pharynx: Oropharynx is clear.     Eyes:      Conjunctiva/sclera: Conjunctivae normal.      Pupils: Pupils are equal, round, and reactive to light.       Cardiovascular:      Rate and Rhythm: Normal rate and regular rhythm.      Heart sounds: Normal heart sounds.   Pulmonary:      Effort: Pulmonary effort is normal.      Breath sounds: Normal breath sounds.   Chest:   Breasts:     Right: Normal. No swelling, inverted nipple, mass or nipple discharge.      Left: No inverted nipple, mass or nipple discharge.      Comments: Left breast with well healed surgical incision.  Skin is intact with noted patchy pigmentation  in intertriginous area and central breast thickening, darkening of skin. No suspicious palpable masses. Mild swelling. Tenderness under left axilla  Abdominal:      Palpations: Abdomen is soft.     Musculoskeletal:         General: No swelling. Normal range of motion.      Cervical back: Normal range of motion and neck supple.   Lymphadenopathy:      Cervical: No cervical adenopathy.      Upper Body:      Right upper body: No supraclavicular or axillary adenopathy.      Left upper body: No supraclavicular or axillary adenopathy.     Skin:     General: Skin is warm and dry.     Neurological:      General: No focal deficit present.      Mental Status: She is alert and oriented to person, place, and time.     Psychiatric:         Mood and Affect: Mood normal.         Behavior: Behavior normal.         RESULTS:  BI mammo bilateral screening tomosynthesis  Status: Final result     PACS Images     Show images for BI mammo bilateral screening tomosynthesis  Signed by    Signed Time Phone Pager   Rocio Asif MD 10/14/2024 14:42 [phone]     Exam Information    Status Exam Begun Exam Ended   Final 10/10/2024 10:23 10/10/2024 10:43     Study Result    Narrative & Impression   Interpreted By:  Rocio Asif,   STUDY:  BI MAMMO BILATERAL SCREENING TOMOSYNTHESIS;  10/10/2024 10:43 am      ACCESSION NUMBER(S):  [id number]      ORDERING CLINICIAN:  FUNMI MARINELLI      INDICATION:  Screening. Left lumpectomy with radiation and chemotherapy in 2023.      ,Z12.31 Encounter for screening mammogram for malignant neoplasm of  breast      COMPARISON:  10/24/2023 and 10/05/2022.      FINDINGS:  2D and tomosynthesis images were reviewed at 1 mm slice thickness.      Density:  The breasts are heterogeneously dense, which may obscure  small masses.      Postsurgical scarring with associated clips and new dystrophic  calcifications is seen in the central lateral left breast at mid to  posterior depth. The diffuse left breast skin  thickening has  lessened. Additional scarring is noted in the left axilla. No  suspicious masses or calcifications are identified in either breast.      IMPRESSION:  Left breast post treatment changes. No mammographic evidence of  malignancy.      BI-RADS CATEGORY:  BI-RADS Category:  2 Benign.  Recommendation:  Annual Screening.  Recommended Date:  1 Year.  Laterality:  Bilateral.              For any future breast imaging appointments, please call [phone]  ([phone]).          MACRO:  None      Signed by: Rocio Asif 10/14/2024 2:42 PM  Dictation workstation:   [id number]           ASSESSMENT/PLAN:  55 y.o. female with left breast cancer s/p neoadjuvant chemotherapy followed by breast conserving surgery followed by radiation. Continues to endorse swelling and tenderness on left side.  She continues PT for breast lymphedema wears compression bra. Getting worked up by plastics for removal and reconstruction. Continue follow up with Derm. Continue imaging and follow up with breast surgery as scheduled.  Radiation follow up in 12 mo.   Call with any questions or concerns.

## 2025-07-29 ENCOUNTER — TELEPHONE (OUTPATIENT)
Dept: RHEUMATOLOGY | Facility: CLINIC | Age: 55
End: 2025-07-29

## 2025-07-29 ENCOUNTER — APPOINTMENT (OUTPATIENT)
Dept: RADIATION ONCOLOGY | Facility: HOSPITAL | Age: 55
End: 2025-07-29
Payer: MEDICARE

## 2025-07-30 ENCOUNTER — SPECIALTY PHARMACY (OUTPATIENT)
Dept: PHARMACY | Facility: CLINIC | Age: 55
End: 2025-07-30

## 2025-07-30 NOTE — PROGRESS NOTES
[facility] Clinical Note  Patient Reassessment     Introduction  Sharonda Yeboah is a 55 y.o. female who is on the specialty pharmacy service for management of: Dermatology Core.      [facility] supplied medication: secukinumab (Cosentyx Pen) 150 mg/mL self-injector pen   Inject 300 mg (2 pens) under the skin every 4 weeks        Duration of therapy: Maintenance    The most recent encounter visit with the referring prescriber Jamia Carter MD on 07/02/25 was reviewed.  Pharmacy will continue to collaborate in the care of this patient with the referring prescriber.    Discussion  Sharonda was contacted on 7/30/2025 at 1:30 PM for a pharmacy visit with encounter number [id number] from:   [facility]  [address] [place] [id number]  Dept: [phone]  Dept Fax: [phone]  Sharonda consented to a/an Telephone visit, which was performed.    Efficacy  Patient has developed new symptoms of condition: Yes - patient states she was developing psoriasis from the medication  Patient/caregiver feels medication is affecting the disease state: In March 2025, pt was reporting that she developed a rash on her face and started taking hydrocortisone to treat. Also endorses having 2 week episode of diarrhea, 3-5 times daily. Pt believes this is due to the Cosentyx. Pt reached out the provider and she was holding her Cosentyx after injecting her 5th dose. Patient said that she is still currently holding her Cosentyx per instructions of MD. Dr. Carter followed up with patient earlier this month and said that the patient had a recent hospitalization for left breast cellulitis/MRSE bacteremia and pt is not on any biologics currently.      Tolerance  Patient has experienced side effects from this medication: Yes - skin rash (psoriasis per pt)  Changes to current therapy regimen: Yes - holding therapy    The follow-up timeline was discussed. Every person responds to and  reacts to therapy differently. Patient should be assessed for efficacy and tolerability in approximately: other - following up with provider on 12/15/25            Adherence  Patient Information  Informant: Self (Patient)  Demonstrates Understanding of Importance of Adherence: Yes  Does the patient have any barriers to self-administration (including physical and mental?): No  Support Network for Adherence: Healthcare Provider  Medication Information  Medication: secukinumab (Cosentyx Pen)  Patient Reported Missed Doses in the Last 4 Weeks: All  Estimated Medication Adherence Level: Variable  Adherence Estimation Source: Claims history  Barriers to Adherence: Adverse Effects, Instructed by provider   The importance of adherence was discussed and patient/caregiver was advised to take the medication as prescribed by their provider. Encouraged patient/caregiver to call physician's office or specialty pharmacy if they have a question regarding a missed dose.      Impression/Plan  This patient has not been identified as high risk due to Lack of high risk qualifiers.  The following action was taken:N/A               Provided contact information ([phone]) for [facility] and reviewed dispensing process, refill timeline and patient management follow up. Confirmed understanding of education conducted during assessment. All questions and concerns were addressed and patient/caregiver was encouraged to reach out for additional questions or concerns.    Based on the patient's diagnosis, medication list, progress towards goals, adherence, tolerance, and medication list, medication remains appropriate: Therapy is not appropriate, provider outreach/MTP documented    Rocio Mujica, CiaraD

## 2025-08-01 ENCOUNTER — DOCUMENTATION (OUTPATIENT)
Dept: DERMATOLOGY | Facility: CLINIC | Age: 55
End: 2025-08-01
Payer: MEDICARE

## 2025-08-01 ENCOUNTER — TELEPHONE (OUTPATIENT)
Dept: DERMATOLOGY | Facility: CLINIC | Age: 55
End: 2025-08-01

## 2025-08-01 NOTE — TELEPHONE ENCOUNTER
Called patient 7/15/25. Discussed plan to start spironolactone as an adjunct treatment for her hidradenitis suppurativa. Patient is agreeable to spironolactone. She declines starting metformin at this time as she thinks she had a bad reaction to this in the past. Recommended patient discuss Ozempic with her PCP as weight loss can help reduce flares.     Contacted patient again 7/18/25. Discussed that there is an interaction between Bactrim and spironolactone. Recommended patient finish course of Bactrim prior to starting spironolactone. Will plan to start spironolactone 25mg daily and increase to 50mg after 2 weeks. Will check potassium 2 weeks after starting 25mg (prior to increase) and again 2 weeks after being on 50mg daily. After discussion, patient reports that she would like to do more research regarding the side effects of spironolactone prior to starting as she is very sensitive to medications.  Reviewed the side effects including lightheadedness, dizziness, breast tenderness, irregular periods, and increased urination.     Encouraged patient to send a [employer] message after she finishes Bactrim and will plan to start spironolactone. Sent scheduling request for follow up in 3 months.     Abi Felton,

## 2025-08-06 ENCOUNTER — TELEPHONE (OUTPATIENT)
Dept: PHYSICAL THERAPY | Facility: CLINIC | Age: 55
End: 2025-08-06
Payer: MEDICARE

## 2025-08-06 ENCOUNTER — TELEPHONE (OUTPATIENT)
Dept: PHYSICAL THERAPY | Facility: CLINIC | Age: 55
End: 2025-08-06

## 2025-08-06 DIAGNOSIS — I97.2 LYMPHEDEMA SYNDROME, POSTMASTECTOMY: Primary | ICD-10-CM

## 2025-08-06 NOTE — TELEPHONE ENCOUNTER
Called and left patient another message to call us back so we can schedule her LYMPH appointments

## 2025-08-11 DIAGNOSIS — Z86.718 HX OF DEEP VENOUS THROMBOSIS: ICD-10-CM

## 2025-08-12 ENCOUNTER — TELEPHONE (OUTPATIENT)
Dept: PLASTIC SURGERY | Facility: CLINIC | Age: 55
End: 2025-08-12
Payer: MEDICARE

## 2025-08-26 ENCOUNTER — OFFICE VISIT (OUTPATIENT)
Dept: PHYSICAL MEDICINE AND REHAB | Facility: CLINIC | Age: 55
End: 2025-08-26
Payer: MEDICARE

## 2025-08-26 VITALS
RESPIRATION RATE: 20 BRPM | HEART RATE: 68 BPM | TEMPERATURE: 98.2 F | SYSTOLIC BLOOD PRESSURE: 141 MMHG | DIASTOLIC BLOOD PRESSURE: 83 MMHG

## 2025-08-26 DIAGNOSIS — C50.912 BREAST CANCER METASTASIZED TO AXILLARY LYMPH NODE, LEFT: ICD-10-CM

## 2025-08-26 DIAGNOSIS — I89.0 LYMPHEDEMA OF LEFT ARM: Primary | ICD-10-CM

## 2025-08-26 DIAGNOSIS — I97.2 LYMPHEDEMA SYNDROME, POSTMASTECTOMY: ICD-10-CM

## 2025-08-26 DIAGNOSIS — I89.0 LYMPHEDEMA OF BREAST: ICD-10-CM

## 2025-08-26 DIAGNOSIS — C77.3 BREAST CANCER METASTASIZED TO AXILLARY LYMPH NODE, LEFT: ICD-10-CM

## 2025-08-26 PROCEDURE — 99214 OFFICE O/P EST MOD 30 MIN: CPT | Performed by: PHYSICAL MEDICINE & REHABILITATION

## 2025-08-26 PROCEDURE — G2211 COMPLEX E/M VISIT ADD ON: HCPCS | Performed by: PHYSICAL MEDICINE & REHABILITATION

## 2025-08-26 ASSESSMENT — PAIN SCALES - GENERAL: PAINLEVEL_OUTOF10: 4

## 2025-08-27 ENCOUNTER — PATIENT MESSAGE (OUTPATIENT)
Dept: DERMATOLOGY | Facility: CLINIC | Age: 55
End: 2025-08-27
Payer: MEDICARE

## 2025-08-28 DIAGNOSIS — I82.4Y2 ACUTE DEEP VEIN THROMBOSIS (DVT) OF PROXIMAL VEIN OF LEFT LOWER EXTREMITY: Primary | ICD-10-CM

## 2025-08-28 DIAGNOSIS — L73.2 HIDRADENITIS SUPPURATIVA: ICD-10-CM

## 2025-08-28 RX ORDER — SULFAMETHOXAZOLE AND TRIMETHOPRIM 800; 160 MG/1; MG/1
1 TABLET ORAL 2 TIMES DAILY
Qty: 120 TABLET | Refills: 0 | Status: SHIPPED | OUTPATIENT
Start: 2025-08-28 | End: 2025-10-27

## 2025-09-03 ENCOUNTER — LAB (OUTPATIENT)
Dept: LAB | Facility: HOSPITAL | Age: 55
End: 2025-09-03
Payer: MEDICARE

## 2025-09-03 DIAGNOSIS — I82.4Y2 ACUTE EMBOLISM AND THROMBOSIS OF UNSPECIFIED DEEP VEINS OF LEFT PROXIMAL LOWER EXTREMITY: Primary | ICD-10-CM

## 2025-09-03 PROCEDURE — 36415 COLL VENOUS BLD VENIPUNCTURE: CPT

## 2025-10-15 ENCOUNTER — APPOINTMENT (OUTPATIENT)
Dept: DERMATOLOGY | Facility: CLINIC | Age: 55
End: 2025-10-15
Payer: MEDICARE

## 2025-11-03 ENCOUNTER — APPOINTMENT (OUTPATIENT)
Dept: PRIMARY CARE | Facility: CLINIC | Age: 55
End: 2025-11-03
Payer: MEDICARE